# Patient Record
Sex: FEMALE | Race: WHITE | NOT HISPANIC OR LATINO | Employment: OTHER | URBAN - NONMETROPOLITAN AREA
[De-identification: names, ages, dates, MRNs, and addresses within clinical notes are randomized per-mention and may not be internally consistent; named-entity substitution may affect disease eponyms.]

---

## 2018-01-12 NOTE — MISCELLANEOUS
Message  Return to work or school:   Shasta Kussmaul is under my professional care  She was seen in my office on 1/29/16   She is able to return to work on  2/4/16      Weight Bearing Status: No Weight-Bearing  Please excuse Ms Porter from work through 2/4/16  She may return to work on [employer] duty          Signatures   Electronically signed by : Bee Marroquin MD; Jan 29 2016 11:46AM EST                       (Author)

## 2018-01-16 NOTE — MISCELLANEOUS
Assessment    1  Status post non-ST elevation myocardial infarction (NSTEMI) (412) (I25 2)   2  ASCVD (arteriosclerotic cardiovascular disease) (429 2,440 9) (I25 10)    Plan  Anxiety    · From  ALPRAZolam 0 5 MG Oral Tablet TAKE 1 TABLET 3 TIMES DAILY AS  NEEDED To ALPRAZolam 1 MG Oral Tablet TAKE 1 TABLET 3 TIMES DAILY AS NEEDED   Rx By: Gokul Sanchez; Dispense: 30 Days ; #:90 Tablet; Refill: 5; For: Anxiety; FARRAH = N; Print Rx  ASCVD (arteriosclerotic cardiovascular disease)    · Begin or continue regular aerobic exercise  Gradually work up to at least 3 sessions of  30 minutes of exercise a week ; Status:Complete;   Done: [id number]   Ordered; For:ASCVD (arteriosclerotic cardiovascular disease); Ordered By:Monserrat Blackmon;   · Brush your teeth 2 times a day and floss at least once a day ; Status:Complete;   Done:  00YQE6012   Ordered; For:ASCVD (arteriosclerotic cardiovascular disease); Ordered By:Monserrat Blackmon;   · Continue with our present treatment plan ; Status:Complete;   Done: 63CFT1613   Ordered; For:ASCVD (arteriosclerotic cardiovascular disease); Ordered By:Monserrat Blackmon;   · Eat a low fat and low cholesterol diet ; Status:Complete;   Done: 71RGB2650   Ordered; For:ASCVD (arteriosclerotic cardiovascular disease); Ordered By:Monserrat Blackmon;   · NSAIDs, avoid; Status:Complete;   Done: 39QZV5285   Ordered; For:ASCVD (arteriosclerotic cardiovascular disease); Ordered By:Monserrat Blackmon;   · We recommend routine visits to a dentist ; Status:Complete;   Done: 11RBK2840   Ordered; For:ASCVD (arteriosclerotic cardiovascular disease); Ordered By:Monserrat Blackmon;   · We recommend that you bring your body mass index down to 25 ; Status:Complete;    Done: 84LUI8953   Ordered; For:ASCVD (arteriosclerotic cardiovascular disease); Ordered By:Monserrat Blackmon;   · You need to stop smoking  Though it is not easy, more than half of all adult smokers  have quit   We encourage you to write down all the reasons you should quit smoking and  set a quit date for yourself  Ask us how we can help  You may also call  1-800-QUIT-NOW for free resources and assistance ; Status:Complete;   Done:  05VNH6012   Ordered; For:ASCVD (arteriosclerotic cardiovascular disease); Ordered By:Denita Blackmon;   · Call [phone] if: You are having chest pain with exercise ; Status:Complete;    Done: 47XCR2388   Ordered; For:ASCVD (arteriosclerotic cardiovascular disease); Ordered By:Denita Blackmon;   · Call [phone] if: You are having more frequent or more severe chest pain with  exercise ; Status:Complete;   Done: 76BSG8156   Ordered; For:ASCVD (arteriosclerotic cardiovascular disease); Ordered By:Denita Blackmon;   · Call [phone] if: You are still having difficulty breathing while lying down in 2 days ;  Status:Complete;   Done: 93MJO6121   Ordered; For:ASCVD (arteriosclerotic cardiovascular disease); Ordered By:Denita Blackmon;   · Call [phone] if: You become dizzy or lightheaded, especially when you stand up  after sitting for awhile ; Status:Complete;   Done: 53MES0567   Ordered; For:ASCVD (arteriosclerotic cardiovascular disease); Ordered By:Denita Blackmon;   · Call [phone] if: You feel your heart is beating very fast or skipping beats ;  Status:Complete;   Done: 69WXG7934   Ordered; For:ASCVD (arteriosclerotic cardiovascular disease); Ordered By:Denita Blackmon;   · Call [phone] if: You have difficulty breathing while lying down and you are  comfortable only when sitting up ; Status:Complete;   Done: 60ALL1554   Ordered; For:ASCVD (arteriosclerotic cardiovascular disease); Ordered By:Denita Blackmon;   · Call [phone] if: You start getting short of breath with your normal exercise or  physical labor ; Status:Complete;   Done: 13MOA2443   Ordered; For:ASCVD (arteriosclerotic cardiovascular disease);  Ordered By:Denita Blackmon;   · Call [phone] if: Your chest pain is happening more often ; Status:Complete;    Done: 05VSS3690   Ordered; For:ASCVD (arteriosclerotic cardiovascular disease); Ordered By:Zaki Blackmon;   · Call [phone] if: Your chest pain with exercise is not better in 3 days ;  Status:Complete;   Done: 99NXR6528   Ordered; For:ASCVD (arteriosclerotic cardiovascular disease); Ordered By:Zaki Blackmon;   · Call [phone] if: Your dizziness is getting worse ; Status:Complete;   Done:  52ZKQ1560   Ordered; For:ASCVD (arteriosclerotic cardiovascular disease); Ordered By:Zaki Blackmon;   · Call [phone] if: Your dizziness is not getting better in 2 days ; Status:Complete;    Done: 03BBU8926   Ordered; For:ASCVD (arteriosclerotic cardiovascular disease); Ordered By:Zaki Blackmon;   · Call [phone] if: Your palpitations are not getting better after treatment at home ;  Status:Complete;   Done: 93EXE3874   Ordered; For:ASCVD (arteriosclerotic cardiovascular disease); Ordered By:Zaki Blackmon;   · Call 911 if: You are too short of breath to talk, you can speak only one or two words  between breaths, or your lips or nails look blue ; Status:Complete;   Done: 93RKL8789   Ordered; For:ASCVD (arteriosclerotic cardiovascular disease); Ordered By:Zaki Blackmon;   · Call 911 if: You experience a new kind of chest pain (angina) or pressure ;  Status:Complete;   Done: 03GKK0750   Ordered; For:ASCVD (arteriosclerotic cardiovascular disease); Ordered By:Zaki Blackmon;   · Call 911 if: You have any symptoms of a stroke ; Status:Complete;   Done: 03QKS0998   Ordered; For:ASCVD (arteriosclerotic cardiovascular disease); Ordered By:Zaki Blackmon;   · Call 911 if: You have fainted or passed out ; Status:Complete;   Done: 37PGO7711   Ordered; For:ASCVD (arteriosclerotic cardiovascular disease);  Ordered By:Zaki Blackmon;   · Call 911 if: Your chest pain (angina) does not go away after 10 minutes of your usual  treatment ; Status:Complete;   Done: 45VPG4989   Ordered; For:ASCVD (arteriosclerotic cardiovascular disease); Ordered By:Victor Hugo Blackmon;   · Seek Immediate Medical Attention if: You have difficulty breathing, or you are short of  breath more often ; Status:Complete;   Done: 17KKI9828   Ordered; For:ASCVD (arteriosclerotic cardiovascular disease); Ordered By:Victor Hugo Blackmon;   · Seek Immediate Medical Attention if: Your chest pain (angina) happens at rest ;  Status:Complete;   Done: 09BAX6165   Ordered; For:ASCVD (arteriosclerotic cardiovascular disease); Ordered By:Victor Hugo Blackmon;   · Seek Immediate Medical Attention if: Your chest pain feels different to you ;  Status:Complete;   Done: 06YXW8556   Ordered; For:ASCVD (arteriosclerotic cardiovascular disease); Ordered By:Victor Hugo Blackmon; Discussion/Summary  Discussion Summary:   I long discussion with her regarding diet and exercise  I went through all of her medications explained her with medications were, there indications, and their potential side effects  Counseling Documentation With Imm: The patient was counseled regarding diagnostic results, instructions for management, risk factor reductions, prognosis, patient and family education, impressions, risks and benefits of treatment options, importance of compliance with treatment  total time of encounter was 30 minutes and 25 minutes was spent counseling  Chief Complaint  Chief Complaint Free Text Note Form: ARIELLE  PT WAS ADMITTED TO [facility] ON 1/24/16 TO 1/26/16 FOR MI  HERE TO FOLLOW UP      History of Present Illness  TCM Communication [facility]: The patient is being contacted for follow-up after hospitalization and PT CALLED TO SCHEDULE  She was hospitalized at [facility]  The date of admission: 1/24/16, date of discharge: 1/26/16  Diagnosis: CHEST PAIN  She was discharged to home  Communication performed and completed by   HPI: She was seen in the [facility] emergency room for substernal chest pain   She had positive troponins  She was diagnosed with a non-ST segment elevation myocardial infarction  She was transferred to [facility] where she underwent urgent heart catheterization  It was one vessel disease and she had a stent placed  She's currently on aspirin and Plavix  She has recently started Lopressor and 80 mg of simvastatin  She has no bleeding episodes  She denies any myalgia or muscle weakness  She is no right upper quadrant pain and no nausea  She's had no chest pain since her procedure  She is feeling well overall  She's quit smoking since she's had her heart attack  Review of Systems  Complete-Female:   Constitutional: No fever, no chills, feels well, no tiredness, no recent weight gain or weight loss  Eyes: No complaints of eye pain, no red eyes, no eyesight problems, no discharge, no dry eyes, no itching of eyes  ENT: no complaints of earache, no loss of hearing, no nose bleeds, no nasal discharge, no sore throat, no hoarseness  Cardiovascular: No complaints of slow heart rate, no fast heart rate, no chest pain, no palpitations, no leg claudication, no lower extremity edema and as noted in HPI  Respiratory: No complaints of shortness of breath, no wheezing, no cough, no SOB on exertion, no orthopnea, no PND  Gastrointestinal: No complaints of abdominal pain, no constipation, no nausea or vomiting, no diarrhea, no bloody stools  Genitourinary: No complaints of dysuria, no incontinence, no pelvic pain, no dysmenorrhea, no vaginal discharge or bleeding  Musculoskeletal: No complaints of arthralgias, no myalgias, no joint swelling or stiffness, no limb pain or swelling  Integumentary: No complaints of skin rash or lesions, no itching, no skin wounds, no breast pain or lump  Neurological: No complaints of headache, no confusion, no convulsions, no numbness, no dizziness or fainting, no tingling, no limb weakness, no difficulty walking     Psychiatric: Not suicidal, no sleep disturbance, no anxiety or depression, no change in personality, no emotional problems  Endocrine: No complaints of proptosis, no hot flashes, no muscle weakness, no deepening of the voice, no feelings of weakness  Hematologic/Lymphatic: No complaints of swollen glands, no swollen glands in the neck, does not bleed easily, does not bruise easily  Active Problems    1  Anxiety (300 00) (F41 9)   2  Cough (786 2) (R05)   3  Encounter for screening mammogram for malignant neoplasm of breast (V76 12)   (Z12 31)   4  Screening for malignant neoplasm of cervix (V76 2) (Z12 4)    Past Medical History    1  History of Anxiety disorder (300 00) (F41 9)   2  History of influenza (V12 09) (Z87 09)    Surgical History    1  History of Foot Surgery   2  History of Tubal Ligation    Family History    1  Family history of Atrial fibrillation   2  Family history of CHF (congestive heart failure)   3  Family history of Diabetes mellitus, type II   4  Family history of Hypertension, essential   5  Family history of Pleural effusion    6  Family history of Hypertension, essential    Social History    · Current Every Day Smoker (305 1)   · Current Smoker (305 1)    Current Meds   1  ALPRAZolam 0 5 MG Oral Tablet; TAKE 1 TABLET 3 TIMES DAILY AS NEEDED; Therapy: (Recorded:29Jan2016) to Recorded   2  Lipitor 80 MG Oral Tablet; TAKE 1 TABLET DAILY; Therapy: (Recorded:29Jan2016) to Recorded   3  Lopressor 50 MG Oral Tablet; take 1/2 tablet by mouth twice a day; Therapy: (Shawna Virgen) to Recorded   4  Nitroglycerin 0 4 MG SUBL; PLACE 1 TABLET UNDER THE TONGUE EVERY 5 MINUTES   UP TO 3 DOSES AS NEEDED FOR CHEST PAIN;   Therapy: (Shawna Virgen) to Recorded   5  Plavix 75 MG Oral Tablet; TAKE 1 TABLET DAILY; Therapy: (Recorded:29Jan2016) to Recorded    Allergies    1   No Known Drug Allergies    Vitals  Signs [Data Includes: Current Encounter]   Recorded: 95ILT0721 11:30AM   Heart Rate: 89  Systolic: 144  Diastolic: 74  Weight: 180 lb   BMI Calculated: 29 05  BSA Calculated: 1 91  O2 Saturation: 98    Physical Exam    Constitutional   General appearance: No acute distress, well appearing and well nourished  Pulmonary   Respiratory effort: No increased work of breathing or signs of respiratory distress  Auscultation of lungs: Clear to auscultation  Cardiovascular   Auscultation of heart: Normal rate and rhythm, normal S1 and S2, without murmurs  Examination of extremities for edema and/or varicosities: Normal     Abdomen   Abdomen: Non-tender, no masses  Liver and spleen: No hepatomegaly or splenomegaly           Signatures   Electronically signed by : Ronda Phoenix, MD; Jan 29 2016  2:47PM EST                       (Author)

## 2018-01-26 DIAGNOSIS — F41.9 ANXIETY: Primary | ICD-10-CM

## 2018-01-26 RX ORDER — ALPRAZOLAM 1 MG/1
TABLET ORAL
Qty: 90 TABLET | Refills: 5 | Status: SHIPPED | OUTPATIENT
Start: 2018-01-26 | End: 2018-04-06 | Stop reason: SDUPTHER

## 2018-02-23 DIAGNOSIS — F41.9 ANXIETY: Primary | ICD-10-CM

## 2018-02-23 RX ORDER — ALPRAZOLAM 1 MG/1
1 TABLET ORAL 3 TIMES DAILY PRN
Qty: 90 TABLET | Refills: 5 | Status: SHIPPED | OUTPATIENT
Start: 2018-02-23 | End: 2018-04-06 | Stop reason: SDUPTHER

## 2018-02-23 RX ORDER — ALPRAZOLAM 1 MG/1
1 TABLET ORAL 3 TIMES DAILY PRN
COMMUNITY
End: 2018-02-23 | Stop reason: SDUPTHER

## 2018-03-01 ENCOUNTER — DOCUMENTATION (OUTPATIENT)
Dept: FAMILY MEDICINE CLINIC | Facility: CLINIC | Age: 53
End: 2018-03-01

## 2018-04-04 RX ORDER — NITROGLYCERIN 0.4 MG/1
1 TABLET SUBLINGUAL
COMMUNITY
End: 2018-10-05

## 2018-04-04 RX ORDER — ESCITALOPRAM OXALATE 10 MG/1
1 TABLET ORAL DAILY
COMMUNITY
Start: 2016-02-25 | End: 2018-04-27

## 2018-04-04 RX ORDER — CLOPIDOGREL BISULFATE 75 MG/1
1 TABLET ORAL DAILY
COMMUNITY
End: 2018-07-13

## 2018-04-04 RX ORDER — ATORVASTATIN CALCIUM 80 MG/1
1 TABLET, FILM COATED ORAL DAILY
COMMUNITY
End: 2018-04-27 | Stop reason: SDUPTHER

## 2018-04-04 RX ORDER — METOPROLOL TARTRATE 50 MG/1
0.5 TABLET, FILM COATED ORAL 2 TIMES DAILY
COMMUNITY
End: 2018-04-27 | Stop reason: SDUPTHER

## 2018-04-06 ENCOUNTER — OFFICE VISIT (OUTPATIENT)
Dept: FAMILY MEDICINE CLINIC | Facility: CLINIC | Age: 53
End: 2018-04-06
Payer: COMMERCIAL

## 2018-04-06 VITALS
RESPIRATION RATE: 16 BRPM | OXYGEN SATURATION: 98 % | DIASTOLIC BLOOD PRESSURE: 82 MMHG | SYSTOLIC BLOOD PRESSURE: 126 MMHG | HEART RATE: 90 BPM

## 2018-04-06 DIAGNOSIS — F41.9 ANXIETY: ICD-10-CM

## 2018-04-06 PROCEDURE — 99213 OFFICE O/P EST LOW 20 MIN: CPT | Performed by: FAMILY MEDICINE

## 2018-04-06 RX ORDER — ALPRAZOLAM 1 MG/1
1 TABLET ORAL 3 TIMES DAILY PRN
Qty: 90 TABLET | Refills: 5 | Status: SHIPPED | OUTPATIENT
Start: 2018-04-06 | End: 2018-11-02 | Stop reason: SDUPTHER

## 2018-04-06 RX ORDER — ASPIRIN 81 MG/1
81 TABLET ORAL DAILY
COMMUNITY
End: 2018-07-13

## 2018-04-06 RX ORDER — ACETAMINOPHEN 325 MG/1
650 TABLET ORAL EVERY 6 HOURS PRN
COMMUNITY
End: 2019-07-03 | Stop reason: HOSPADM

## 2018-04-06 NOTE — PROGRESS NOTES
Assessment/Plan:    No problem-specific Assessment & Plan notes found for this encounter  Diagnoses and all orders for this visit:    Anxiety  -     ALPRAZolam (XANAX) 1 mg tablet; Take 1 tablet (1 mg total) by mouth 3 (three) times a day as needed for anxiety    Other orders  -     clopidogrel (PLAVIX) 75 mg tablet; Take 1 tablet by mouth daily  -     nitroglycerin (NITROSTAT) 0 4 mg SL tablet; Place 1 tablet under the tongue every 5 (five) minutes as needed  -     metoprolol tartrate (LOPRESSOR) 50 mg tablet; Take 0 5 tablets by mouth 2 (two) times a day  -     atorvastatin (LIPITOR) 80 mg tablet; Take 1 tablet by mouth daily  -     escitalopram (LEXAPRO) 10 mg tablet; Take 1 tablet by mouth daily  -     Hm Colonoscopy  -     aspirin (ECOTRIN LOW STRENGTH) 81 mg EC tablet; Take 81 mg by mouth daily  -     acetaminophen (TYLENOL) 325 mg tablet; Take 650 mg by mouth every 6 (six) hours as needed for mild pain          Subjective:      Patient ID: Gita Aschoff is a 50 y o  female  She hurt her right 5th toe about 5 days ago  She has significant pain and swelling  She bruised extensively  She has pain in various parts of her foot that radiate to different areas  She had normal x-rays  She is wearing a fracture boot and using crutches  The following portions of the patient's history were reviewed and updated as appropriate:   She  has a past medical history of Anxiety  She There are no active problems to display for this patient  She  has a past surgical history that includes Toe Surgery and Tubal ligation  Her family history includes Atrial fibrillation in her mother; Diabetes type II in her mother; Heart failure in her mother; Hypertension in her father and mother; Lung disease in her mother  She  reports that she has been smoking  She has never used smokeless tobacco  Her alcohol and drug histories are not on file    Current Outpatient Prescriptions   Medication Sig Dispense Refill  acetaminophen (TYLENOL) 325 mg tablet Take 650 mg by mouth every 6 (six) hours as needed for mild pain      ALPRAZolam (XANAX) 1 mg tablet Take 1 tablet (1 mg total) by mouth 3 (three) times a day as needed for anxiety 90 tablet 5    aspirin (ECOTRIN LOW STRENGTH) 81 mg EC tablet Take 81 mg by mouth daily      nitroglycerin (NITROSTAT) 0 4 mg SL tablet Place 1 tablet under the tongue every 5 (five) minutes as needed      atorvastatin (LIPITOR) 80 mg tablet Take 1 tablet by mouth daily      clopidogrel (PLAVIX) 75 mg tablet Take 1 tablet by mouth daily      escitalopram (LEXAPRO) 10 mg tablet Take 1 tablet by mouth daily      metoprolol tartrate (LOPRESSOR) 50 mg tablet Take 0 5 tablets by mouth 2 (two) times a day       No current facility-administered medications for this visit  No current outpatient prescriptions on file prior to visit  No current facility-administered medications on file prior to visit  She has No Known Allergies       Review of Systems   All other systems reviewed and are negative  Objective:      /82 (BP Location: Right arm, Patient Position: Sitting, Cuff Size: Large)   Pulse 90   Resp 16   SpO2 98%          Physical Exam   Constitutional: She is oriented to person, place, and time  She appears well-developed and well-nourished  Neck: Normal range of motion  Neck supple  Cardiovascular: Normal rate, regular rhythm, normal heart sounds and intact distal pulses  Pulmonary/Chest: Effort normal and breath sounds normal    Abdominal: Soft  Bowel sounds are normal    Musculoskeletal: Normal range of motion  Neurological: She is alert and oriented to person, place, and time  She has normal reflexes  Skin: Skin is warm and dry  Psychiatric: She has a normal mood and affect  Her behavior is normal  Judgment and thought content normal    Nursing note and vitals reviewed

## 2018-04-27 ENCOUNTER — OFFICE VISIT (OUTPATIENT)
Dept: FAMILY MEDICINE CLINIC | Facility: CLINIC | Age: 53
End: 2018-04-27
Payer: COMMERCIAL

## 2018-04-27 VITALS
HEART RATE: 86 BPM | RESPIRATION RATE: 16 BRPM | DIASTOLIC BLOOD PRESSURE: 74 MMHG | SYSTOLIC BLOOD PRESSURE: 124 MMHG | OXYGEN SATURATION: 97 %

## 2018-04-27 DIAGNOSIS — G89.29 CHRONIC PAIN OF RIGHT ANKLE: ICD-10-CM

## 2018-04-27 DIAGNOSIS — I25.10 ASCVD (ARTERIOSCLEROTIC CARDIOVASCULAR DISEASE): ICD-10-CM

## 2018-04-27 DIAGNOSIS — E78.5 HYPERLIPIDEMIA, UNSPECIFIED HYPERLIPIDEMIA TYPE: Primary | ICD-10-CM

## 2018-04-27 DIAGNOSIS — M25.571 CHRONIC PAIN OF RIGHT ANKLE: ICD-10-CM

## 2018-04-27 DIAGNOSIS — I25.10 CORONARY ARTERY DISEASE INVOLVING NATIVE CORONARY ARTERY OF NATIVE HEART WITHOUT ANGINA PECTORIS: ICD-10-CM

## 2018-04-27 PROCEDURE — 99214 OFFICE O/P EST MOD 30 MIN: CPT | Performed by: FAMILY MEDICINE

## 2018-04-27 RX ORDER — ATORVASTATIN CALCIUM 80 MG/1
80 TABLET, FILM COATED ORAL DAILY
Qty: 30 TABLET | Refills: 5 | Status: SHIPPED | OUTPATIENT
Start: 2018-04-27 | End: 2018-10-05 | Stop reason: SDUPTHER

## 2018-04-27 RX ORDER — METOPROLOL TARTRATE 50 MG/1
25 TABLET, FILM COATED ORAL 2 TIMES DAILY
Qty: 30 TABLET | Refills: 5 | Status: SHIPPED | OUTPATIENT
Start: 2018-04-27 | End: 2018-10-05 | Stop reason: SDUPTHER

## 2018-04-27 NOTE — PROGRESS NOTES
Assessment/Plan:    No problem-specific Assessment & Plan notes found for this encounter  Diagnoses and all orders for this visit:    Hyperlipidemia, unspecified hyperlipidemia type  -     atorvastatin (LIPITOR) 80 mg tablet; Take 1 tablet (80 mg total) by mouth daily    Coronary artery disease involving native coronary artery of native heart without angina pectoris  -     metoprolol tartrate (LOPRESSOR) 50 mg tablet; Take 0 5 tablets (25 mg total) by mouth 2 (two) times a day          Subjective:      Patient ID: Juliet Wallis is a 50 y o  female  She has on-going right foot issues  She has pain and swelling  She is in a fracture boot and crutches  She is now having pain in the top of the left foot  She has no constitutional sx's  She injured her foot and ankle at the end of March  She presented to the emergency room and had normal x-rays  She has pain, swelling, decreased range of motion, and ambulatory dysfunction since  She has practicing [facility]  She has single vessel CAD and is s/p stent  She stopped Plavix, metoprolol, and Lipitor  She is not having chest pain or SOB  She had more fatigue than anything else prior to her stent  She has no palpitations, syncope/near syncope, diaphoresis, or SOB  She would like to be established with [address] [place] [address] cardiology  The following portions of the patient's history were reviewed and updated as appropriate:   She  has a past medical history of Anxiety  She   Patient Active Problem List    Diagnosis Date Noted    ASCVD (arteriosclerotic cardiovascular disease) 01/29/2016    Status post non-ST elevation myocardial infarction (NSTEMI) 01/29/2016    S/p bare metal coronary artery stent 01/26/2016    HLD (hyperlipidemia) 01/24/2016    NSTEMI (non-ST elevated myocardial infarction) ([facility] [address] ) 01/24/2016    Anxiety 02/12/2013     She  has a past surgical history that includes Toe Surgery and Tubal ligation    Her family history includes Atrial fibrillation in her mother; Diabetes type II in her mother; Heart failure in her mother; Hypertension in her father and mother; Lung disease in her mother  She  reports that she has been smoking  She has never used smokeless tobacco  She reports that she does not drink alcohol or use drugs  Current Outpatient Prescriptions   Medication Sig Dispense Refill    ALPRAZolam (XANAX) 1 mg tablet Take 1 tablet (1 mg total) by mouth 3 (three) times a day as needed for anxiety 90 tablet 5    aspirin (ECOTRIN LOW STRENGTH) 81 mg EC tablet Take 81 mg by mouth daily      acetaminophen (TYLENOL) 325 mg tablet Take 650 mg by mouth every 6 (six) hours as needed for mild pain      atorvastatin (LIPITOR) 80 mg tablet Take 1 tablet (80 mg total) by mouth daily 30 tablet 5    clopidogrel (PLAVIX) 75 mg tablet Take 1 tablet by mouth daily      metoprolol tartrate (LOPRESSOR) 50 mg tablet Take 0 5 tablets (25 mg total) by mouth 2 (two) times a day 30 tablet 5    nitroglycerin (NITROSTAT) 0 4 mg SL tablet Place 1 tablet under the tongue every 5 (five) minutes as needed       No current facility-administered medications for this visit        Current Outpatient Prescriptions on File Prior to Visit   Medication Sig    ALPRAZolam (XANAX) 1 mg tablet Take 1 tablet (1 mg total) by mouth 3 (three) times a day as needed for anxiety    aspirin (ECOTRIN LOW STRENGTH) 81 mg EC tablet Take 81 mg by mouth daily    acetaminophen (TYLENOL) 325 mg tablet Take 650 mg by mouth every 6 (six) hours as needed for mild pain    clopidogrel (PLAVIX) 75 mg tablet Take 1 tablet by mouth daily    nitroglycerin (NITROSTAT) 0 4 mg SL tablet Place 1 tablet under the tongue every 5 (five) minutes as needed    [DISCONTINUED] atorvastatin (LIPITOR) 80 mg tablet Take 1 tablet by mouth daily    [DISCONTINUED] escitalopram (LEXAPRO) 10 mg tablet Take 1 tablet by mouth daily    [DISCONTINUED] metoprolol tartrate (LOPRESSOR) 50 mg tablet Take 0 5 tablets by mouth 2 (two) times a day     No current facility-administered medications on file prior to visit  She has No Known Allergies       Review of Systems   Musculoskeletal: Positive for arthralgias  All other systems reviewed and are negative  Objective:      /74 (BP Location: Right arm, Patient Position: Sitting, Cuff Size: Standard)   Pulse 86   Resp 16   SpO2 97%          Physical Exam   Constitutional: She is oriented to person, place, and time  She appears well-developed and well-nourished  Neck: Normal range of motion  Neck supple  Cardiovascular: Normal rate, regular rhythm, normal heart sounds and intact distal pulses  Pulmonary/Chest: Effort normal and breath sounds normal    Abdominal: Soft  Bowel sounds are normal    Musculoskeletal: Normal range of motion  Neurological: She is alert and oriented to person, place, and time  She has normal reflexes  Skin: Skin is warm and dry  Psychiatric: She has a normal mood and affect  Her behavior is normal  Judgment and thought content normal    Nursing note and vitals reviewed

## 2018-04-27 NOTE — PATIENT INSTRUCTIONS
Please restart Lipitor (atorvastatin) and Lopressor (metoprolol)  These medications are meant to keep you from having another heart attack and to keep the "partial blockages" from getting worse

## 2018-05-14 ENCOUNTER — TELEPHONE (OUTPATIENT)
Dept: FAMILY MEDICINE CLINIC | Facility: CLINIC | Age: 53
End: 2018-05-14

## 2018-05-15 DIAGNOSIS — M25.571 RIGHT ANKLE PAIN, UNSPECIFIED CHRONICITY: Primary | ICD-10-CM

## 2018-05-15 NOTE — TELEPHONE ENCOUNTER
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

## 2018-05-23 ENCOUNTER — TELEPHONE (OUTPATIENT)
Dept: FAMILY MEDICINE CLINIC | Facility: CLINIC | Age: 53
End: 2018-05-23

## 2018-05-23 NOTE — TELEPHONE ENCOUNTER
Phone call from patient stating she will need letter for [employer] stating that she needs to be off work for the month of June pending her cardiac and orthopedic testing and evaluations   FAX to [phone]

## 2018-05-30 ENCOUNTER — TELEPHONE (OUTPATIENT)
Dept: FAMILY MEDICINE CLINIC | Facility: CLINIC | Age: 53
End: 2018-05-30

## 2018-05-30 NOTE — TELEPHONE ENCOUNTER
Second request from patient   Melissa Hernandes   Requesting note to keep her off work through the end of June due to specialist appointments faxed to [facility]  Will you agree to note

## 2018-06-22 RX ORDER — ISOSORBIDE MONONITRATE 30 MG/1
30 TABLET, EXTENDED RELEASE ORAL DAILY
Refills: 0 | COMMUNITY
Start: 2018-06-18 | End: 2018-07-13

## 2018-06-22 RX ORDER — ASPIRIN 81 MG/1
TABLET ORAL
COMMUNITY
End: 2020-08-13 | Stop reason: SDUPTHER

## 2018-06-22 RX ORDER — AMOXICILLIN 500 MG/1
CAPSULE ORAL
COMMUNITY
End: 2018-07-13

## 2018-06-22 RX ORDER — NITROGLYCERIN 0.4 MG/1
TABLET SUBLINGUAL
COMMUNITY
End: 2018-10-05

## 2018-06-25 ENCOUNTER — OFFICE VISIT (OUTPATIENT)
Dept: FAMILY MEDICINE CLINIC | Facility: CLINIC | Age: 53
End: 2018-06-25
Payer: COMMERCIAL

## 2018-06-25 VITALS
WEIGHT: 199.6 LBS | RESPIRATION RATE: 15 BRPM | OXYGEN SATURATION: 97 % | BODY MASS INDEX: 32.22 KG/M2 | SYSTOLIC BLOOD PRESSURE: 116 MMHG | DIASTOLIC BLOOD PRESSURE: 68 MMHG | HEART RATE: 79 BPM

## 2018-06-25 DIAGNOSIS — I25.10 ASCVD (ARTERIOSCLEROTIC CARDIOVASCULAR DISEASE): ICD-10-CM

## 2018-06-25 DIAGNOSIS — M79.672 LEFT FOOT PAIN: ICD-10-CM

## 2018-06-25 DIAGNOSIS — E78.5 HYPERLIPIDEMIA, UNSPECIFIED HYPERLIPIDEMIA TYPE: ICD-10-CM

## 2018-06-25 DIAGNOSIS — K90.49 FATTY FOOD INTOLERANCE: Primary | ICD-10-CM

## 2018-06-25 PROCEDURE — 99214 OFFICE O/P EST MOD 30 MIN: CPT | Performed by: FAMILY MEDICINE

## 2018-06-25 NOTE — PROGRESS NOTES
Assessment/Plan:    No problem-specific Assessment & Plan notes found for this encounter  Diagnoses and all orders for this visit:    Fatty food intolerance  -     US right upper quadrant; Future  -     CBC and differential; Future  -     Comprehensive metabolic panel; Future  -     TSH, 3rd generation with T4 reflex; Future    ASCVD (arteriosclerotic cardiovascular disease)  -     CBC and differential; Future  -     Comprehensive metabolic panel; Future  -     TSH, 3rd generation with T4 reflex; Future    Left foot pain  -     XR foot 3+ vw left; Future    Hyperlipidemia, unspecified hyperlipidemia type  -     CBC and differential; Future  -     Comprehensive metabolic panel; Future  -     TSH, 3rd generation with T4 reflex; Future    Other orders  -     isosorbide mononitrate (IMDUR) 30 mg 24 hr tablet; Take 30 mg by mouth daily  -     amoxicillin (AMOXIL) 500 mg capsule; amoxicillin 500 mg capsule  -     Influenza Virus Vacc Split PF (AFLURIA PRESERVATIVE FREE) 0 5 ML YUMIKO; inject 0 5 milliliter intramuscularly          Subjective:      Patient ID: Redd Hampton is a 50 y o  female  She is scheduled for cardiac cath this Friday  She had an abnormal EKG followed by a normal TTE and nuclear stress test   Her cardiologist scheduled her  She has known heart disease and is on ASA, metoprolol, and Lipitor  She is seeing Dr Tripp Hilton ([address]) regarding her right ankle  She is wearing a boot intermittently when she has pain  She is having left foot and ankle pain  She is favoring that foot due to her right sided pain  She has pain for several weeks  She is taking Tylenol for pain  She was told by ortho to hold off on PT until cleared by cardiology  She is having burning substernal CP that is made better with with abd maneuvers  She has some GERD sx's  She has some fatty food intolerance            The following portions of the patient's history were reviewed and updated as appropriate:   She has a past medical history of Anxiety  She   Patient Active Problem List    Diagnosis Date Noted    Fatty food intolerance 06/25/2018    Left foot pain 06/25/2018    ASCVD (arteriosclerotic cardiovascular disease) 01/29/2016    Status post non-ST elevation myocardial infarction (NSTEMI) 01/29/2016    S/p bare metal coronary artery stent 01/26/2016    HLD (hyperlipidemia) 01/24/2016    NSTEMI (non-ST elevated myocardial infarction) [facility] [address] ) 01/24/2016    Anxiety 02/12/2013     She  has a past surgical history that includes Toe Surgery and Tubal ligation  Her family history includes Atrial fibrillation in her mother; Diabetes type II in her mother; Heart failure in her mother; Hypertension in her father and mother; Lung disease in her mother  She  reports that she has been smoking  She has never used smokeless tobacco  She reports that she does not drink alcohol or use drugs    Current Outpatient Prescriptions   Medication Sig Dispense Refill    acetaminophen (TYLENOL) 325 mg tablet Take 650 mg by mouth every 6 (six) hours as needed for mild pain      ALPRAZolam (XANAX) 1 mg tablet Take 1 tablet (1 mg total) by mouth 3 (three) times a day as needed for anxiety 90 tablet 5    aspirin (ASPIRIN ADULT LOW DOSE) 81 mg EC tablet Adult Low Dose Aspirin      atorvastatin (LIPITOR) 80 mg tablet Take 1 tablet (80 mg total) by mouth daily 30 tablet 5    diclofenac sodium (VOLTAREN) 1 % Apply 2 g topically 4 (four) times a day 5 Tube 5    isosorbide mononitrate (IMDUR) 30 mg 24 hr tablet Take 30 mg by mouth daily  0    metoprolol tartrate (LOPRESSOR) 50 mg tablet Take 0 5 tablets (25 mg total) by mouth 2 (two) times a day 30 tablet 5    nitroglycerin (NITROSTAT) 0 4 mg SL tablet Place 1 tablet under the tongue every 5 (five) minutes as needed      amoxicillin (AMOXIL) 500 mg capsule amoxicillin 500 mg capsule      aspirin (ECOTRIN LOW STRENGTH) 81 mg EC tablet Take 81 mg by mouth daily      clopidogrel (PLAVIX) 75 mg tablet Take 1 tablet by mouth daily      Influenza Virus Vacc Split PF (AFLURIA PRESERVATIVE FREE) 0 5 ML YUMIKO inject 0 5 milliliter intramuscularly      nitroglycerin (NITROSTAT) 0 4 mg SL tablet PRN       No current facility-administered medications for this visit  Current Outpatient Prescriptions on File Prior to Visit   Medication Sig    acetaminophen (TYLENOL) 325 mg tablet Take 650 mg by mouth every 6 (six) hours as needed for mild pain    ALPRAZolam (XANAX) 1 mg tablet Take 1 tablet (1 mg total) by mouth 3 (three) times a day as needed for anxiety    aspirin (ASPIRIN ADULT LOW DOSE) 81 mg EC tablet Adult Low Dose Aspirin    atorvastatin (LIPITOR) 80 mg tablet Take 1 tablet (80 mg total) by mouth daily    diclofenac sodium (VOLTAREN) 1 % Apply 2 g topically 4 (four) times a day    metoprolol tartrate (LOPRESSOR) 50 mg tablet Take 0 5 tablets (25 mg total) by mouth 2 (two) times a day    nitroglycerin (NITROSTAT) 0 4 mg SL tablet Place 1 tablet under the tongue every 5 (five) minutes as needed    aspirin (ECOTRIN LOW STRENGTH) 81 mg EC tablet Take 81 mg by mouth daily    clopidogrel (PLAVIX) 75 mg tablet Take 1 tablet by mouth daily    nitroglycerin (NITROSTAT) 0 4 mg SL tablet PRN     No current facility-administered medications on file prior to visit  She has No Known Allergies       Review of Systems      Objective:      /68 (BP Location: Right arm, Patient Position: Sitting, Cuff Size: Standard)   Pulse 79   Resp 15   Wt 90 5 kg (199 lb 9 6 oz) Comment: with boot on  SpO2 97%   BMI 32 22 kg/m²          Physical Exam

## 2018-06-27 LAB
ALBUMIN SERPL-MCNC: 4.1 G/DL (ref 3.6–5.1)
ALBUMIN/GLOB SERPL: 1.9 (CALC) (ref 1–2.5)
ALP SERPL-CCNC: 128 U/L (ref 33–130)
ALT SERPL-CCNC: 30 U/L (ref 6–29)
AST SERPL-CCNC: 21 U/L (ref 10–35)
BASOPHILS # BLD AUTO: 84 CELLS/UL (ref 0–200)
BASOPHILS NFR BLD AUTO: 1 %
BILIRUB SERPL-MCNC: 0.4 MG/DL (ref 0.2–1.2)
BUN SERPL-MCNC: 10 MG/DL (ref 7–25)
BUN/CREAT SERPL: ABNORMAL (CALC) (ref 6–22)
CALCIUM SERPL-MCNC: 9 MG/DL (ref 8.6–10.4)
CHLORIDE SERPL-SCNC: 104 MMOL/L (ref 98–110)
CO2 SERPL-SCNC: 24 MMOL/L (ref 20–31)
CREAT SERPL-MCNC: 0.92 MG/DL (ref 0.5–1.05)
EOSINOPHIL # BLD AUTO: 176 CELLS/UL (ref 15–500)
EOSINOPHIL NFR BLD AUTO: 2.1 %
ERYTHROCYTE [DISTWIDTH] IN BLOOD BY AUTOMATED COUNT: 12.7 % (ref 11–15)
GLOBULIN SER CALC-MCNC: 2.2 G/DL (CALC) (ref 1.9–3.7)
GLUCOSE SERPL-MCNC: 113 MG/DL (ref 65–99)
HCT VFR BLD AUTO: 44.5 % (ref 35–45)
HGB BLD-MCNC: 15.1 G/DL (ref 11.7–15.5)
LYMPHOCYTES # BLD AUTO: 1898 CELLS/UL (ref 850–3900)
LYMPHOCYTES NFR BLD AUTO: 22.6 %
MCH RBC QN AUTO: 30.6 PG (ref 27–33)
MCHC RBC AUTO-ENTMCNC: 33.9 G/DL (ref 32–36)
MCV RBC AUTO: 90.1 FL (ref 80–100)
MONOCYTES # BLD AUTO: 470 CELLS/UL (ref 200–950)
MONOCYTES NFR BLD AUTO: 5.6 %
NEUTROPHILS # BLD AUTO: 5771 CELLS/UL (ref 1500–7800)
NEUTROPHILS NFR BLD AUTO: 68.7 %
PLATELET # BLD AUTO: 229 THOUSAND/UL (ref 140–400)
PMV BLD REES-ECKER: 9.7 FL (ref 7.5–12.5)
POTASSIUM SERPL-SCNC: 4.4 MMOL/L (ref 3.5–5.3)
PROT SERPL-MCNC: 6.3 G/DL (ref 6.1–8.1)
RBC # BLD AUTO: 4.94 MILLION/UL (ref 3.8–5.1)
SL AMB EGFR AFRICAN AMERICAN: 84 ML/MIN/1.73M2
SL AMB EGFR NON AFRICAN AMERICAN: 73 ML/MIN/1.73M2
SODIUM SERPL-SCNC: 139 MMOL/L (ref 135–146)
TSH SERPL-ACNC: 2.97 MIU/L
WBC # BLD AUTO: 8.4 THOUSAND/UL (ref 3.8–10.8)

## 2018-07-13 ENCOUNTER — OFFICE VISIT (OUTPATIENT)
Dept: FAMILY MEDICINE CLINIC | Facility: CLINIC | Age: 53
End: 2018-07-13

## 2018-07-13 VITALS — OXYGEN SATURATION: 97 % | HEART RATE: 86 BPM | SYSTOLIC BLOOD PRESSURE: 126 MMHG | DIASTOLIC BLOOD PRESSURE: 72 MMHG

## 2018-07-13 DIAGNOSIS — I25.10 ASCVD (ARTERIOSCLEROTIC CARDIOVASCULAR DISEASE): ICD-10-CM

## 2018-07-13 DIAGNOSIS — E78.5 HYPERLIPIDEMIA, UNSPECIFIED HYPERLIPIDEMIA TYPE: ICD-10-CM

## 2018-07-13 DIAGNOSIS — Z95.5 S/P BARE METAL CORONARY ARTERY STENT: ICD-10-CM

## 2018-07-13 DIAGNOSIS — I21.4 NSTEMI (NON-ST ELEVATED MYOCARDIAL INFARCTION) (HCC): ICD-10-CM

## 2018-07-13 DIAGNOSIS — M79.672 LEFT FOOT PAIN: ICD-10-CM

## 2018-07-13 DIAGNOSIS — R00.2 PALPITATIONS: ICD-10-CM

## 2018-07-13 DIAGNOSIS — I10 ESSENTIAL HYPERTENSION: Primary | ICD-10-CM

## 2018-07-13 PROCEDURE — 99214 OFFICE O/P EST MOD 30 MIN: CPT | Performed by: FAMILY MEDICINE

## 2018-07-13 RX ORDER — AMLODIPINE BESYLATE 5 MG/1
5 TABLET ORAL DAILY
Refills: 0
Start: 2018-07-13 | End: 2018-10-05

## 2018-07-13 NOTE — PROGRESS NOTES
Assessment/Plan:    No problem-specific Assessment & Plan notes found for this encounter  Diagnoses and all orders for this visit:    Essential hypertension  -     amLODIPine (NORVASC) 5 mg tablet; Take 1 tablet (5 mg total) by mouth daily    ASCVD (arteriosclerotic cardiovascular disease)    [facility]    Hyperlipidemia, unspecified hyperlipidemia type    S/p bare metal coronary artery stent    Left foot pain  -     MRI foot/forefoot toes left wo contrast; Future          Subjective:      Patient ID: Remi Parent is a 50 y o  female  For she is status post  Non ST segment elevation myocardial infarction  She underwent cardiac catheterization at that time and has a bare metal stent  She had repeat cardiac catheterization on the  29th of June  She has 2 lesions  One is 50 percent and the 2nd is 60 percent  She is being medically managed with  Toprol 25 mg daily, 81 mg of aspirin, 75 mg of clopidogrel, and 80 mg of Lipitor  She states that she knows that the lesions are present, but she has no anginal symptoms  She has a RUQ U/S pending to evaluate some vague epigastric discomfort  She continues to have burning pain in the left foot  She has trouble walking  She has decreased ROM throughout  She has no trauma  She has a fairly normal x-ray  She is seeing a foot and ankle specialist       She denies anxiety  The following portions of the patient's history were reviewed and updated as appropriate:   She  has a past medical history of Anxiety    She   Patient Active Problem List    Diagnosis Date Noted    Fatty food intolerance 06/25/2018    Left foot pain 06/25/2018    ASCVD (arteriosclerotic cardiovascular disease) 01/29/2016    Status post non-ST elevation myocardial infarction (NSTEMI) 01/29/2016    S/p bare metal coronary artery stent 01/26/2016    HLD (hyperlipidemia) 01/24/2016    [facility] 01/[facility]/2016    Anxiety 02/12/2013     She  has a past surgical history that includes Toe Surgery and Tubal ligation  Her family history includes Atrial fibrillation in her mother; Diabetes type II in her mother; Heart failure in her mother; Hypertension in her father and mother; Lung disease in her mother  She  reports that she has been smoking  She has never used smokeless tobacco  She reports that she does not drink alcohol or use drugs  Current Outpatient Prescriptions   Medication Sig Dispense Refill    acetaminophen (TYLENOL) 325 mg tablet Take 650 mg by mouth every 6 (six) hours as needed for mild pain      ALPRAZolam (XANAX) 1 mg tablet Take 1 tablet (1 mg total) by mouth 3 (three) times a day as needed for anxiety 90 tablet 5    amLODIPine (NORVASC) 5 mg tablet Take 1 tablet (5 mg total) by mouth daily  0    aspirin (ASPIRIN ADULT LOW DOSE) 81 mg EC tablet Adult Low Dose Aspirin      atorvastatin (LIPITOR) 80 mg tablet Take 1 tablet (80 mg total) by mouth daily 30 tablet 5    diclofenac sodium (VOLTAREN) 1 % Apply 2 g topically 4 (four) times a day 5 Tube 5    Influenza Virus Vacc Split PF (AFLURIA PRESERVATIVE FREE) 0 5 ML YUMIKO inject 0 5 milliliter intramuscularly      metoprolol tartrate (LOPRESSOR) 50 mg tablet Take 0 5 tablets (25 mg total) by mouth 2 (two) times a day 30 tablet 5    nitroglycerin (NITROSTAT) 0 4 mg SL tablet Place 1 tablet under the tongue every 5 (five) minutes as needed      nitroglycerin (NITROSTAT) 0 4 mg SL tablet PRN       No current facility-administered medications for this visit        Current Outpatient Prescriptions on File Prior to Visit   Medication Sig    acetaminophen (TYLENOL) 325 mg tablet Take 650 mg by mouth every 6 (six) hours as needed for mild pain    ALPRAZolam (XANAX) 1 mg tablet Take 1 tablet (1 mg total) by mouth 3 (three) times a day as needed for anxiety    aspirin (ASPIRIN ADULT LOW DOSE) 81 mg EC tablet Adult Low Dose Aspirin    atorvastatin (LIPITOR) 80 mg tablet Take 1 tablet (80 mg total) by mouth daily    diclofenac sodium (VOLTAREN) 1 % Apply 2 g topically 4 (four) times a day    Influenza Virus Vacc Split PF (AFLURIA PRESERVATIVE FREE) 0 5 ML YUMIKO inject 0 5 milliliter intramuscularly    metoprolol tartrate (LOPRESSOR) 50 mg tablet Take 0 5 tablets (25 mg total) by mouth 2 (two) times a day    nitroglycerin (NITROSTAT) 0 4 mg SL tablet Place 1 tablet under the tongue every 5 (five) minutes as needed    nitroglycerin (NITROSTAT) 0 4 mg SL tablet PRN    [DISCONTINUED] amoxicillin (AMOXIL) 500 mg capsule amoxicillin 500 mg capsule    [DISCONTINUED] aspirin (ECOTRIN LOW STRENGTH) 81 mg EC tablet Take 81 mg by mouth daily    [DISCONTINUED] clopidogrel (PLAVIX) 75 mg tablet Take 1 tablet by mouth daily    [DISCONTINUED] isosorbide mononitrate (IMDUR) 30 mg 24 hr tablet Take 30 mg by mouth daily     No current facility-administered medications on file prior to visit  She has No Known Allergies       Review of Systems   All other systems reviewed and are negative  Objective:      /72 (BP Location: Left arm, Patient Position: Sitting)   Pulse 86   SpO2 97%          Physical Exam   Constitutional: She is oriented to person, place, and time  She appears well-developed and well-nourished  Neck: Normal range of motion  Neck supple  Cardiovascular: Normal rate, regular rhythm, normal heart sounds and intact distal pulses  Pulmonary/Chest: Effort normal and breath sounds normal    Abdominal: Soft  Bowel sounds are normal    Musculoskeletal: Normal range of motion  Neurological: She is alert and oriented to person, place, and time  She has normal reflexes  Skin: Skin is warm and dry  Psychiatric: She has a normal mood and affect  Her behavior is normal  Judgment and thought content normal    Nursing note and vitals reviewed

## 2018-07-30 ENCOUNTER — HOSPITAL ENCOUNTER (OUTPATIENT)
Dept: NON INVASIVE DIAGNOSTICS | Facility: HOSPITAL | Age: 53
Discharge: HOME | End: 2018-07-30
Attending: FAMILY MEDICINE
Payer: COMMERCIAL

## 2018-07-30 DIAGNOSIS — R00.2 PALPITATIONS: ICD-10-CM

## 2018-07-30 PROCEDURE — 93225 XTRNL ECG REC<48 HRS REC: CPT

## 2018-07-30 PROCEDURE — 93226 XTRNL ECG REC<48 HR SCAN A/R: CPT

## 2018-08-03 PROCEDURE — 93227 XTRNL ECG REC<48 HR R&I: CPT | Performed by: INTERNAL MEDICINE

## 2018-08-09 RX ORDER — ISOSORBIDE MONONITRATE 30 MG/1
TABLET, EXTENDED RELEASE ORAL
COMMUNITY
End: 2019-07-03 | Stop reason: HOSPADM

## 2018-08-10 ENCOUNTER — OFFICE VISIT (OUTPATIENT)
Dept: FAMILY MEDICINE CLINIC | Facility: CLINIC | Age: 53
End: 2018-08-10

## 2018-08-10 VITALS
RESPIRATION RATE: 15 BRPM | OXYGEN SATURATION: 98 % | WEIGHT: 198.6 LBS | DIASTOLIC BLOOD PRESSURE: 80 MMHG | SYSTOLIC BLOOD PRESSURE: 128 MMHG | HEIGHT: 65 IN | HEART RATE: 96 BPM | BODY MASS INDEX: 33.09 KG/M2

## 2018-08-10 DIAGNOSIS — M79.672 LEFT FOOT PAIN: Primary | ICD-10-CM

## 2018-08-10 PROCEDURE — 99212 OFFICE O/P EST SF 10 MIN: CPT | Performed by: FAMILY MEDICINE

## 2018-08-10 NOTE — PROGRESS NOTES
Assessment/Plan:    No problem-specific Assessment & Plan notes found for this encounter  Diagnoses and all orders for this visit:    Left foot pain        Cont current  Subjective:      Patient ID: Tere Kelsey is a 48 y o  female  She continues to have pain and decreased ROM of both lower extremities  She has pain and swelling in both feet  She was not able to start PT  She is doing home techniques with stretching  She is having a hard time walking  She is fairly sedentary  She has b/l foot cramping  She had a relatively normal Holter monitor (PAC's)  She has some sinus tachycardia  She is on 25 mg of Lopressor twice daily  The following portions of the patient's history were reviewed and updated as appropriate:   She  has a past medical history of Anxiety  She   Patient Active Problem List    Diagnosis Date Noted    Palpitations 07/13/2018    Fatty food intolerance 06/25/2018    Left foot pain 06/25/2018    ASCVD (arteriosclerotic cardiovascular disease) 01/29/2016    Status post non-ST elevation myocardial infarction (NSTEMI) 01/29/2016    S/p bare metal coronary artery stent 01/26/2016    HLD (hyperlipidemia) 01/24/2016    NSTEMI (non-ST elevated myocardial infarction) ([facility] [address] ) 01/24/2016    Anxiety 02/12/2013     She  has a past surgical history that includes Toe Surgery and Tubal ligation  Her family history includes Atrial fibrillation in her mother; Diabetes type II in her mother; Heart failure in her mother; Hypertension in her father and mother; Lung disease in her mother  She  reports that she has been smoking  She has never used smokeless tobacco  She reports that she does not drink alcohol or use drugs    Current Outpatient Prescriptions   Medication Sig Dispense Refill    acetaminophen (TYLENOL) 325 mg tablet Take 650 mg by mouth every 6 (six) hours as needed for mild pain      ALPRAZolam (XANAX) 1 mg tablet Take 1 tablet (1 mg total) by mouth 3 (three) times a day as needed for anxiety 90 tablet 5    amLODIPine (NORVASC) 5 mg tablet Take 1 tablet (5 mg total) by mouth daily  0    aspirin (ASPIRIN ADULT LOW DOSE) 81 mg EC tablet Adult Low Dose Aspirin      atorvastatin (LIPITOR) 80 mg tablet Take 1 tablet (80 mg total) by mouth daily 30 tablet 5    diclofenac sodium (VOLTAREN) 1 % Apply 2 g topically 4 (four) times a day 5 Tube 5    metoprolol tartrate (LOPRESSOR) 50 mg tablet Take 0 5 tablets (25 mg total) by mouth 2 (two) times a day 30 tablet 5    Influenza Virus Vacc Split PF (AFLURIA PRESERVATIVE FREE) 0 5 ML YUMIKO inject 0 5 milliliter intramuscularly      isosorbide mononitrate (IMDUR) 30 mg 24 hr tablet take 1 tablet by mouth once daily      nitroglycerin (NITROSTAT) 0 4 mg SL tablet Place 1 tablet under the tongue every 5 (five) minutes as needed      nitroglycerin (NITROSTAT) 0 4 mg SL tablet PRN       No current facility-administered medications for this visit        Current Outpatient Prescriptions on File Prior to Visit   Medication Sig    acetaminophen (TYLENOL) 325 mg tablet Take 650 mg by mouth every 6 (six) hours as needed for mild pain    ALPRAZolam (XANAX) 1 mg tablet Take 1 tablet (1 mg total) by mouth 3 (three) times a day as needed for anxiety    amLODIPine (NORVASC) 5 mg tablet Take 1 tablet (5 mg total) by mouth daily    aspirin (ASPIRIN ADULT LOW DOSE) 81 mg EC tablet Adult Low Dose Aspirin    atorvastatin (LIPITOR) 80 mg tablet Take 1 tablet (80 mg total) by mouth daily    diclofenac sodium (VOLTAREN) 1 % Apply 2 g topically 4 (four) times a day    metoprolol tartrate (LOPRESSOR) 50 mg tablet Take 0 5 tablets (25 mg total) by mouth 2 (two) times a day    Influenza Virus Vacc Split PF (AFLURIA PRESERVATIVE FREE) 0 5 ML YUMIKO inject 0 5 milliliter intramuscularly    isosorbide mononitrate (IMDUR) 30 mg 24 hr tablet take 1 tablet by mouth once daily    nitroglycerin (NITROSTAT) 0 4 mg SL tablet Place 1 tablet under the tongue every 5 (five) minutes as needed    nitroglycerin (NITROSTAT) 0 4 mg SL tablet PRN     No current facility-administered medications on file prior to visit  She has No Known Allergies       Review of Systems   Cardiovascular: Positive for palpitations  All other systems reviewed and are negative  Objective:      /80 (BP Location: Right arm, Patient Position: Sitting, Cuff Size: Standard)   Pulse 96   Resp 15   Ht 5' 5" (1 651 m)   Wt 90 1 kg (198 lb 9 6 oz)   SpO2 98%   BMI 33 05 kg/m²          Physical Exam   Constitutional: She is oriented to person, place, and time  She appears well-developed and well-nourished  Neck: Normal range of motion  Neck supple  Cardiovascular: Normal rate, regular rhythm, normal heart sounds and intact distal pulses  Pulmonary/Chest: Effort normal and breath sounds normal    Abdominal: Soft  Bowel sounds are normal    Musculoskeletal: Normal range of motion  Neurological: She is alert and oriented to person, place, and time  She has normal reflexes  Skin: Skin is warm and dry  Psychiatric: She has a normal mood and affect  Her behavior is normal  Judgment and thought content normal    Nursing note and vitals reviewed

## 2018-09-07 ENCOUNTER — TELEPHONE (OUTPATIENT)
Dept: FAMILY MEDICINE CLINIC | Facility: CLINIC | Age: 53
End: 2018-09-07

## 2018-09-07 ENCOUNTER — DOCUMENTATION (OUTPATIENT)
Dept: FAMILY MEDICINE CLINIC | Facility: CLINIC | Age: 53
End: 2018-09-07

## 2018-10-05 ENCOUNTER — DOCUMENTATION (OUTPATIENT)
Dept: FAMILY MEDICINE CLINIC | Facility: CLINIC | Age: 53
End: 2018-10-05

## 2018-10-05 ENCOUNTER — OFFICE VISIT (OUTPATIENT)
Dept: FAMILY MEDICINE CLINIC | Facility: CLINIC | Age: 53
End: 2018-10-05
Payer: COMMERCIAL

## 2018-10-05 VITALS
BODY MASS INDEX: 34.39 KG/M2 | DIASTOLIC BLOOD PRESSURE: 62 MMHG | OXYGEN SATURATION: 97 % | RESPIRATION RATE: 16 BRPM | HEART RATE: 75 BPM | WEIGHT: 206.4 LBS | HEIGHT: 65 IN | SYSTOLIC BLOOD PRESSURE: 124 MMHG

## 2018-10-05 DIAGNOSIS — J32.9 SINUSITIS, UNSPECIFIED CHRONICITY, UNSPECIFIED LOCATION: ICD-10-CM

## 2018-10-05 DIAGNOSIS — R00.2 PALPITATIONS: ICD-10-CM

## 2018-10-05 DIAGNOSIS — E78.5 HYPERLIPIDEMIA, UNSPECIFIED HYPERLIPIDEMIA TYPE: ICD-10-CM

## 2018-10-05 DIAGNOSIS — I25.10 CORONARY ARTERY DISEASE INVOLVING NATIVE CORONARY ARTERY OF NATIVE HEART WITHOUT ANGINA PECTORIS: ICD-10-CM

## 2018-10-05 DIAGNOSIS — Z95.5 S/P BARE METAL CORONARY ARTERY STENT: ICD-10-CM

## 2018-10-05 DIAGNOSIS — K90.49 FATTY FOOD INTOLERANCE: Primary | ICD-10-CM

## 2018-10-05 DIAGNOSIS — M79.672 LEFT FOOT PAIN: ICD-10-CM

## 2018-10-05 PROCEDURE — 99214 OFFICE O/P EST MOD 30 MIN: CPT | Performed by: FAMILY MEDICINE

## 2018-10-05 RX ORDER — METOPROLOL TARTRATE 50 MG/1
50 TABLET, FILM COATED ORAL 2 TIMES DAILY
Qty: 60 TABLET | Refills: 5 | Status: SHIPPED | OUTPATIENT
Start: 2018-10-05 | End: 2018-11-12 | Stop reason: SDUPTHER

## 2018-10-05 RX ORDER — AMOXICILLIN 500 MG/1
500 CAPSULE ORAL EVERY 8 HOURS SCHEDULED
Qty: 21 CAPSULE | Refills: 0 | Status: SHIPPED | OUTPATIENT
Start: 2018-10-05 | End: 2018-10-12

## 2018-10-05 RX ORDER — RANITIDINE 150 MG/1
150 CAPSULE ORAL 2 TIMES DAILY
Qty: 60 CAPSULE | Refills: 5 | Status: SHIPPED | OUTPATIENT
Start: 2018-10-05 | End: 2018-11-02 | Stop reason: SDUPTHER

## 2018-10-05 RX ORDER — ATORVASTATIN CALCIUM 80 MG/1
80 TABLET, FILM COATED ORAL DAILY
Qty: 30 TABLET | Refills: 5 | Status: SHIPPED | OUTPATIENT
Start: 2018-10-05 | End: 2018-11-02 | Stop reason: SDUPTHER

## 2018-10-05 NOTE — PATIENT INSTRUCTIONS
Ranitidine (By injection)   Ranitidine ([id number])  Treats stomach ulcers and conditions that cause your stomach to make too much acid (such as Zollinger-Arroyo syndrome)  This medicine is given to people who cannot take Zantac® by mouth  Brand Name(s): Zantac   There may be other brand names for this medicine  When This Medicine Should Not Be Used: You should not use this medicine if you have had an allergic reaction to ranitidine  How to Use This Medicine:   Injectable  · Your doctor will prescribe your exact dose and tell you how often it should be given  This medicine is given as a shot into a vein or into one of your muscles  · A nurse or other health provider will give you this medicine  Drugs and Foods to Avoid:   Ask your doctor or pharmacist before using any other medicine, including over-the-counter medicines, vitamins, and herbal products  · Make sure your doctor knows if you are also using triazolam (Halcion®)  Warnings While Using This Medicine:   · Make sure your doctor knows if you are pregnant or breast feeding  Tell your doctor if you have kidney disease, liver disease, heart rhythm problems, or a history of acute porphyria  · Tell any doctor or dentist who treats you that you are using this medicine  This medicine may affect certain medical test results  Possible Side Effects While Using This Medicine:   Call your doctor right away if you notice any of these side effects:  · Allergic reaction: Itching or hives, swelling in your face or hands, swelling or tingling in your mouth or throat, chest tightness, trouble breathing  · Blistering, peeling, red skin rash  · Dark-colored urine or pale stools  · Fast, slow, or uneven heartbeat  · Nausea, vomiting, loss of appetite, pain in your upper stomach  · Pain, itching, or burning in the skin where the needle is placed  · Unusual bruising, bleeding, or weakness  · Yellowing of your skin or the whites of your eyes    If you notice these less serious side effects, talk with your doctor:   · Constipation or diarrhea  · Headache, may be severe  · Upset stomach  If you notice other side effects that you think are caused by this medicine, tell your doctor  Call your doctor for medical advice about side effects  You may report side effects to FDA at 0-385-FDA-2693  © 2017 [employer]  Information is for End User's use only and may not be sold, redistributed or otherwise used for commercial purposes  The above information is an  only  It is not intended as medical advice for individual conditions or treatments  Talk to your doctor, nurse or pharmacist before following any medical regimen to see if it is safe and effective for you  Follow a low fat diet until we can get your ultrasound

## 2018-10-05 NOTE — PROGRESS NOTES
Assessment/Plan:    No problem-specific Assessment & Plan notes found for this encounter  Diagnoses and all orders for this visit:    Fatty food intolerance  -     US right upper quadrant; Future    Hyperlipidemia, unspecified hyperlipidemia type  -     atorvastatin (LIPITOR) 80 mg tablet; Take 1 tablet (80 mg total) by mouth daily    Coronary artery disease involving native coronary artery of native heart without angina pectoris  -     metoprolol tartrate (LOPRESSOR) 50 mg tablet; Take 1 tablet (50 mg total) by mouth 2 (two) times a day    Sinusitis, unspecified chronicity, unspecified location  -     amoxicillin (AMOXIL) 500 mg capsule; Take 1 capsule (500 mg total) by mouth every 8 (eight) hours for 7 days    Palpitations    S/p bare metal coronary artery stent          Subjective:      Patient ID: Rian Chaidez is a 48 y o  female  She continues to have palpitations  She had a relatively normal cardiac cath in July of 2018  She had a normal Holter monitor this summer as well  She has fatty food intolerance, heart burn, and bloating  She has not gone for RUQ U/S  The following portions of the patient's history were reviewed and updated as appropriate:   She  has a past medical history of Anxiety  She   Patient Active Problem List    Diagnosis Date Noted    Palpitations 07/13/2018    Fatty food intolerance 06/25/2018    Left foot pain 06/25/2018    ASCVD (arteriosclerotic cardiovascular disease) 01/29/2016    Status post non-ST elevation myocardial infarction (NSTEMI) 01/29/2016    S/p bare metal coronary artery stent 01/26/2016    HLD (hyperlipidemia) 01/24/2016    NSTEMI (non-ST elevated myocardial infarction) ([facility] [address] ) 01/24/2016    Anxiety 02/12/2013     She  has a past surgical history that includes Toe Surgery and Tubal ligation    Her family history includes Atrial fibrillation in her mother; Diabetes type II in her mother; Heart failure in her mother; Hypertension in her father and mother; Lung disease in her mother  She  reports that she has been smoking  She has never used smokeless tobacco  She reports that she does not drink alcohol or use drugs  Current Outpatient Prescriptions   Medication Sig Dispense Refill    acetaminophen (TYLENOL) 325 mg tablet Take 650 mg by mouth every 6 (six) hours as needed for mild pain      ALPRAZolam (XANAX) 1 mg tablet Take 1 tablet (1 mg total) by mouth 3 (three) times a day as needed for anxiety 90 tablet 5    aspirin (ASPIRIN ADULT LOW DOSE) 81 mg EC tablet Adult Low Dose Aspirin      atorvastatin (LIPITOR) 80 mg tablet Take 1 tablet (80 mg total) by mouth daily 30 tablet 5    diclofenac sodium (VOLTAREN) 1 % Apply 2 g topically 4 (four) times a day 5 Tube 5    isosorbide mononitrate (IMDUR) 30 mg 24 hr tablet take 1 tablet by mouth once daily      metoprolol tartrate (LOPRESSOR) 50 mg tablet Take 1 tablet (50 mg total) by mouth 2 (two) times a day 60 tablet 5    amoxicillin (AMOXIL) 500 mg capsule Take 1 capsule (500 mg total) by mouth every 8 (eight) hours for 7 days 21 capsule 0    Influenza Virus Vacc Split PF (AFLURIA PRESERVATIVE FREE) 0 5 ML YUMIKO inject 0 5 milliliter intramuscularly       No current facility-administered medications for this visit        Current Outpatient Prescriptions on File Prior to Visit   Medication Sig    acetaminophen (TYLENOL) 325 mg tablet Take 650 mg by mouth every 6 (six) hours as needed for mild pain    ALPRAZolam (XANAX) 1 mg tablet Take 1 tablet (1 mg total) by mouth 3 (three) times a day as needed for anxiety    aspirin (ASPIRIN ADULT LOW DOSE) 81 mg EC tablet Adult Low Dose Aspirin    diclofenac sodium (VOLTAREN) 1 % Apply 2 g topically 4 (four) times a day    isosorbide mononitrate (IMDUR) 30 mg 24 hr tablet take 1 tablet by mouth once daily    [DISCONTINUED] atorvastatin (LIPITOR) 80 mg tablet Take 1 tablet (80 mg total) by mouth daily    [DISCONTINUED] metoprolol tartrate (LOPRESSOR) 50 mg tablet Take 0 5 tablets (25 mg total) by mouth 2 (two) times a day (Patient taking differently: Take 50 mg by mouth 2 (two) times a day  )    Influenza Virus Vacc Split PF (AFLURIA PRESERVATIVE FREE) 0 5 ML YUMIKO inject 0 5 milliliter intramuscularly    [DISCONTINUED] amLODIPine (NORVASC) 5 mg tablet Take 1 tablet (5 mg total) by mouth daily (Patient not taking: Reported on 10/5/2018 )    [DISCONTINUED] nitroglycerin (NITROSTAT) 0 4 mg SL tablet Place 1 tablet under the tongue every 5 (five) minutes as needed    [DISCONTINUED] nitroglycerin (NITROSTAT) 0 4 mg SL tablet PRN     No current facility-administered medications on file prior to visit  She has No Known Allergies       Review of Systems   All other systems reviewed and are negative  Objective:      /62 (BP Location: Right arm, Patient Position: Sitting, Cuff Size: Standard)   Pulse 75   Resp 16   Ht 5' 5" (1 651 m)   Wt 93 6 kg (206 lb 6 4 oz)   SpO2 97%   BMI 34 35 kg/m²          Physical Exam   Constitutional: She is oriented to person, place, and time  She appears well-developed and well-nourished  Neck: Normal range of motion  Neck supple  Cardiovascular: Normal rate, regular rhythm, normal heart sounds and intact distal pulses  Pulmonary/Chest: Effort normal and breath sounds normal    Abdominal: Soft  Bowel sounds are normal    Musculoskeletal: Normal range of motion  Neurological: She is alert and oriented to person, place, and time  She has normal reflexes  Skin: Skin is warm and dry  Psychiatric: She has a normal mood and affect  Her behavior is normal  Judgment and thought content normal    Nursing note and vitals reviewed

## 2018-11-02 DIAGNOSIS — F41.9 ANXIETY: ICD-10-CM

## 2018-11-02 DIAGNOSIS — K90.49 FATTY FOOD INTOLERANCE: ICD-10-CM

## 2018-11-02 DIAGNOSIS — E78.5 HYPERLIPIDEMIA, UNSPECIFIED HYPERLIPIDEMIA TYPE: ICD-10-CM

## 2018-11-02 RX ORDER — RANITIDINE 150 MG/1
150 CAPSULE ORAL 2 TIMES DAILY
Qty: 60 CAPSULE | Refills: 0 | Status: SHIPPED | OUTPATIENT
Start: 2018-11-02 | End: 2018-11-12 | Stop reason: SDUPTHER

## 2018-11-02 RX ORDER — ALPRAZOLAM 1 MG/1
1 TABLET ORAL 3 TIMES DAILY PRN
Qty: 90 TABLET | Refills: 0 | Status: SHIPPED | OUTPATIENT
Start: 2018-11-02 | End: 2018-11-12 | Stop reason: SDUPTHER

## 2018-11-02 RX ORDER — ATORVASTATIN CALCIUM 80 MG/1
80 TABLET, FILM COATED ORAL DAILY
Qty: 30 TABLET | Refills: 0 | Status: SHIPPED | OUTPATIENT
Start: 2018-11-02 | End: 2019-05-28 | Stop reason: SDUPTHER

## 2018-11-07 DIAGNOSIS — K90.49 FATTY FOOD INTOLERANCE: ICD-10-CM

## 2018-11-09 RX ORDER — AMLODIPINE BESYLATE 5 MG/1
TABLET ORAL
Refills: 1 | COMMUNITY
Start: 2018-11-02 | End: 2018-11-12

## 2018-11-12 ENCOUNTER — OFFICE VISIT (OUTPATIENT)
Dept: FAMILY MEDICINE CLINIC | Facility: CLINIC | Age: 53
End: 2018-11-12
Payer: COMMERCIAL

## 2018-11-12 VITALS
SYSTOLIC BLOOD PRESSURE: 114 MMHG | WEIGHT: 206.6 LBS | HEART RATE: 77 BPM | RESPIRATION RATE: 16 BRPM | DIASTOLIC BLOOD PRESSURE: 80 MMHG | HEIGHT: 65 IN | BODY MASS INDEX: 34.42 KG/M2 | OXYGEN SATURATION: 97 %

## 2018-11-12 DIAGNOSIS — R68.81 EARLY SATIETY: Primary | ICD-10-CM

## 2018-11-12 DIAGNOSIS — K90.49 FATTY FOOD INTOLERANCE: ICD-10-CM

## 2018-11-12 DIAGNOSIS — I21.4 NSTEMI (NON-ST ELEVATED MYOCARDIAL INFARCTION) (HCC): ICD-10-CM

## 2018-11-12 DIAGNOSIS — G47.33 OBSTRUCTIVE SLEEP APNEA SYNDROME: ICD-10-CM

## 2018-11-12 DIAGNOSIS — F41.9 ANXIETY: ICD-10-CM

## 2018-11-12 DIAGNOSIS — I25.10 ASCVD (ARTERIOSCLEROTIC CARDIOVASCULAR DISEASE): ICD-10-CM

## 2018-11-12 DIAGNOSIS — I25.10 CORONARY ARTERY DISEASE INVOLVING NATIVE CORONARY ARTERY OF NATIVE HEART WITHOUT ANGINA PECTORIS: ICD-10-CM

## 2018-11-12 PROCEDURE — 99214 OFFICE O/P EST MOD 30 MIN: CPT | Performed by: FAMILY MEDICINE

## 2018-11-12 PROCEDURE — 3008F BODY MASS INDEX DOCD: CPT | Performed by: FAMILY MEDICINE

## 2018-11-12 RX ORDER — ALPRAZOLAM 1 MG/1
1 TABLET ORAL 3 TIMES DAILY PRN
Qty: 90 TABLET | Refills: 5 | Status: SHIPPED | OUTPATIENT
Start: 2018-11-12 | End: 2019-05-28 | Stop reason: SDUPTHER

## 2018-11-12 RX ORDER — METOPROLOL TARTRATE 50 MG/1
50 TABLET, FILM COATED ORAL 2 TIMES DAILY
Qty: 60 TABLET | Refills: 5 | Status: SHIPPED | OUTPATIENT
Start: 2018-11-12 | End: 2019-11-05 | Stop reason: SDUPTHER

## 2018-11-12 RX ORDER — RANITIDINE 150 MG/1
150 CAPSULE ORAL 2 TIMES DAILY
Qty: 60 CAPSULE | Refills: 5 | Status: SHIPPED | OUTPATIENT
Start: 2018-11-12 | End: 2019-06-05 | Stop reason: SDUPTHER

## 2018-11-12 NOTE — PROGRESS NOTES
Assessment/Plan:    No problem-specific Assessment & Plan notes found for this encounter  Diagnoses and all orders for this visit:    Early satiety  -     CT abdomen pelvis w contrast; Future    Anxiety  -     ALPRAZolam (XANAX) 1 mg tablet; Take 1 tablet (1 mg total) by mouth 3 (three) times a day as needed for anxiety    Coronary artery disease involving native coronary artery of native heart without angina pectoris  -     metoprolol tartrate (LOPRESSOR) 50 mg tablet; Take 1 tablet (50 mg total) by mouth 2 (two) times a day    Fatty food intolerance  -     ranitidine (ZANTAC) 150 MG capsule; Take 1 capsule (150 mg total) by mouth 2 (two) times a day  -     NM hepatobiliary w rx; Future    Other orders  -     Discontinue: amLODIPine (NORVASC) 5 mg tablet;           Subjective:      Patient ID: Jeana Potter is a 48 y o  female  She continues to have fatty food intolerance  She has nausea and GERD  She has early satiety  Her weight is stable  She has a number of complaints regarding her feet  She has pain and decreased mobility  She has known CAD and needs cardiovascular follow-up  She had nuclear stress test, echo, and cardiac cath this summer  The following portions of the patient's history were reviewed and updated as appropriate:   She  has a past medical history of Anxiety  She   Patient Active Problem List    Diagnosis Date Noted    Palpitations 07/13/2018    Fatty food intolerance 06/25/2018    Left foot pain 06/25/2018    ASCVD (arteriosclerotic cardiovascular disease) 01/29/2016    Status post non-ST elevation myocardial infarction (NSTEMI) 01/29/2016    S/p bare metal coronary artery stent 01/26/2016    HLD (hyperlipidemia) 01/24/2016    NSTEMI (non-ST elevated myocardial infarction) ([facility] [address] ) 01/24/2016    Anxiety 02/12/2013     She  has a past surgical history that includes Toe Surgery and Tubal ligation    Her family history includes Atrial fibrillation in her mother; Diabetes type II in her mother; Heart failure in her mother; Hypertension in her father and mother; Lung disease in her mother  She  reports that she has been smoking  She has never used smokeless tobacco  She reports that she does not drink alcohol or use drugs  Current Outpatient Prescriptions   Medication Sig Dispense Refill    acetaminophen (TYLENOL) 325 mg tablet Take 650 mg by mouth every 6 (six) hours as needed for mild pain      ALPRAZolam (XANAX) 1 mg tablet Take 1 tablet (1 mg total) by mouth 3 (three) times a day as needed for anxiety 90 tablet 5    aspirin (ASPIRIN ADULT LOW DOSE) 81 mg EC tablet Adult Low Dose Aspirin      atorvastatin (LIPITOR) 80 mg tablet Take 1 tablet (80 mg total) by mouth daily 30 tablet 0    isosorbide mononitrate (IMDUR) 30 mg 24 hr tablet take 1 tablet by mouth once daily      metoprolol tartrate (LOPRESSOR) 50 mg tablet Take 1 tablet (50 mg total) by mouth 2 (two) times a day 60 tablet 5    ranitidine (ZANTAC) 150 MG capsule Take 1 capsule (150 mg total) by mouth 2 (two) times a day 60 capsule 5    Influenza Virus Vacc Split PF (AFLURIA PRESERVATIVE FREE) 0 5 ML YUMIKO inject 0 5 milliliter intramuscularly       No current facility-administered medications for this visit        Current Outpatient Prescriptions on File Prior to Visit   Medication Sig    acetaminophen (TYLENOL) 325 mg tablet Take 650 mg by mouth every 6 (six) hours as needed for mild pain    aspirin (ASPIRIN ADULT LOW DOSE) 81 mg EC tablet Adult Low Dose Aspirin    atorvastatin (LIPITOR) 80 mg tablet Take 1 tablet (80 mg total) by mouth daily    isosorbide mononitrate (IMDUR) 30 mg 24 hr tablet take 1 tablet by mouth once daily    [DISCONTINUED] ALPRAZolam (XANAX) 1 mg tablet Take 1 tablet (1 mg total) by mouth 3 (three) times a day as needed for anxiety    [DISCONTINUED] metoprolol tartrate (LOPRESSOR) 50 mg tablet Take 1 tablet (50 mg total) by mouth 2 (two) times a day    [DISCONTINUED] ranitidine (ZANTAC) 150 MG capsule Take 1 capsule (150 mg total) by mouth 2 (two) times a day    Influenza Virus Vacc Split PF (AFLURIA PRESERVATIVE FREE) 0 5 ML YUMIKO inject 0 5 milliliter intramuscularly    [DISCONTINUED] diclofenac sodium (VOLTAREN) 1 % Apply 2 g topically 4 (four) times a day (Patient not taking: Reported on 11/12/2018 )    [DISCONTINUED] diclofenac sodium (VOLTAREN) 1 % Apply 2 g topically 4 (four) times a day (Patient not taking: Reported on 11/12/2018 )     No current facility-administered medications on file prior to visit  She has No Known Allergies       Review of Systems   All other systems reviewed and are negative  Objective:      /80 (BP Location: Left arm, Patient Position: Sitting, Cuff Size: Large)   Pulse 77   Resp 16   Ht 5' 5" (1 651 m)   Wt 93 7 kg (206 lb 9 6 oz)   SpO2 97%   BMI 34 38 kg/m²          Physical Exam   Constitutional: She is oriented to person, place, and time  She appears well-developed and well-nourished  Neck: Normal range of motion  Neck supple  Cardiovascular: Normal rate, regular rhythm, normal heart sounds and intact distal pulses  Pulmonary/Chest: Effort normal and breath sounds normal    Abdominal: Soft  Bowel sounds are normal    Musculoskeletal: Normal range of motion  Neurological: She is alert and oriented to person, place, and time  She has normal reflexes  Skin: Skin is warm and dry  Psychiatric: She has a normal mood and affect  Her behavior is normal  Judgment and thought content normal    Nursing note and vitals reviewed

## 2018-12-13 ENCOUNTER — TELEPHONE (OUTPATIENT)
Dept: FAMILY MEDICINE CLINIC | Facility: CLINIC | Age: 53
End: 2018-12-13

## 2019-01-09 ENCOUNTER — HOSPITAL ENCOUNTER (OUTPATIENT)
Dept: CT IMAGING | Facility: HOSPITAL | Age: 54
Discharge: HOME | End: 2019-01-09
Attending: FAMILY MEDICINE
Payer: COMMERCIAL

## 2019-01-09 DIAGNOSIS — R68.81 EARLY SATIETY: ICD-10-CM

## 2019-01-09 PROCEDURE — 74177 CT ABD & PELVIS W/CONTRAST: CPT

## 2019-01-09 RX ADMIN — IOHEXOL 100 ML: 350 INJECTION, SOLUTION INTRAVENOUS at 07:47

## 2019-01-15 ENCOUNTER — OFFICE VISIT (OUTPATIENT)
Dept: FAMILY MEDICINE CLINIC | Facility: CLINIC | Age: 54
End: 2019-01-15
Payer: COMMERCIAL

## 2019-01-15 VITALS
WEIGHT: 210.4 LBS | OXYGEN SATURATION: 97 % | HEIGHT: 65 IN | RESPIRATION RATE: 14 BRPM | DIASTOLIC BLOOD PRESSURE: 72 MMHG | HEART RATE: 81 BPM | SYSTOLIC BLOOD PRESSURE: 120 MMHG | BODY MASS INDEX: 35.06 KG/M2

## 2019-01-15 DIAGNOSIS — I21.4 NSTEMI (NON-ST ELEVATED MYOCARDIAL INFARCTION) (HCC): ICD-10-CM

## 2019-01-15 DIAGNOSIS — K90.49 FATTY FOOD INTOLERANCE: ICD-10-CM

## 2019-01-15 DIAGNOSIS — I25.10 ASCVD (ARTERIOSCLEROTIC CARDIOVASCULAR DISEASE): ICD-10-CM

## 2019-01-15 DIAGNOSIS — Z95.5 S/P BARE METAL CORONARY ARTERY STENT: ICD-10-CM

## 2019-01-15 DIAGNOSIS — I25.2 STATUS POST NON-ST ELEVATION MYOCARDIAL INFARCTION (NSTEMI): ICD-10-CM

## 2019-01-15 DIAGNOSIS — R93.5 ABNORMAL CT OF THE ABDOMEN: Primary | ICD-10-CM

## 2019-01-15 PROCEDURE — 99214 OFFICE O/P EST MOD 30 MIN: CPT | Performed by: FAMILY MEDICINE

## 2019-01-15 PROCEDURE — 3008F BODY MASS INDEX DOCD: CPT | Performed by: FAMILY MEDICINE

## 2019-01-15 NOTE — PROGRESS NOTES
Assessment/Plan:    Fatty food intolerance  Await HIDA  Obstructive sleep apnea syndrome  Await sleep study  ASCVD (arteriosclerotic cardiovascular disease)  Check nuclear stress test       Diagnoses and all orders for this visit:    Abnormal CT of the abdomen  -     MRI abdomen w wo contrast; Future    Fatty food intolerance    Status post non-ST elevation myocardial infarction (NSTEMI)  -     NM myocardial perfusion spect (rx stress and/or rest); Future    S/p bare metal coronary artery stent  -     NM myocardial perfusion spect (rx stress and/or rest); Future    NSTEMI (non-ST elevated myocardial infarction) ([address] )  -     NM myocardial perfusion spect (rx stress and/or rest); Future    ASCVD (arteriosclerotic cardiovascular disease)  -     NM myocardial perfusion spect (rx stress and/or rest); Future          Subjective:      Patient ID: Fidel Mcmullen is a 48 y o  female  She has a number of concerns today:    1 )  She has epigastric and substernal pain  She has a number of potential etiologies based on her pain  She has fatty food intolerance with a normal right upper quadrant ultrasound  She he is going for a HIDA scan  She had a relatively normal CT scan of the abdomen  See below for details  She has known coronary artery disease  Her substernal pain is quite atypical as it happens at rest and is burning in nature  She states that this does not feel like her usual reflux symptoms, however  2 )  She had a recent CT abd  There "is a irregular stellate appearing 9 mm focus of enhancement in the posterior right hepatic lobe on image 24 of series 2  This appears to be branching and therefore may represent a vascular lesion "      3)  She falls asleep at in operating times  When she does fall asleep, she sleeps deeply and misses texts and phone calls  She does not remember falling asleep and is out for hours at a time  4 )  She continues to have pain in both ankles  They lock in place  The following portions of the patient's history were reviewed and updated as appropriate:   She  has a past medical history of Anxiety  She   Patient Active Problem List    Diagnosis Date Noted    Abnormal CT of the abdomen 01/15/2019    Obstructive sleep apnea syndrome 11/12/2018    Palpitations 07/13/2018    Fatty food intolerance 06/25/2018    Left foot pain 06/25/2018    ASCVD (arteriosclerotic cardiovascular disease) 01/29/2016    Status post non-ST elevation myocardial infarction (NSTEMI) 01/29/2016    S/p bare metal coronary artery stent 01/26/2016    HLD (hyperlipidemia) 01/24/2016    NSTEMI (non-ST elevated myocardial infarction) ([facility] [address] ) 01/24/2016    Anxiety 02/12/2013     She  has a past surgical history that includes Toe Surgery and Tubal ligation  Her family history includes Atrial fibrillation in her mother; Diabetes type II in her mother; Heart failure in her mother; Hypertension in her father and mother; Lung disease in her mother  She  reports that she has been smoking  She has never used smokeless tobacco  She reports that she does not drink alcohol or use drugs    Current Outpatient Prescriptions   Medication Sig Dispense Refill    acetaminophen (TYLENOL) 325 mg tablet Take 650 mg by mouth every 6 (six) hours as needed for mild pain      ALPRAZolam (XANAX) 1 mg tablet Take 1 tablet (1 mg total) by mouth 3 (three) times a day as needed for anxiety 90 tablet 5    aspirin (ASPIRIN ADULT LOW DOSE) 81 mg EC tablet Adult Low Dose Aspirin      atorvastatin (LIPITOR) 80 mg tablet Take 1 tablet (80 mg total) by mouth daily 30 tablet 0    isosorbide mononitrate (IMDUR) 30 mg 24 hr tablet take 1 tablet by mouth once daily      metoprolol tartrate (LOPRESSOR) 50 mg tablet Take 1 tablet (50 mg total) by mouth 2 (two) times a day 60 tablet 5    ranitidine (ZANTAC) 150 MG capsule Take 1 capsule (150 mg total) by mouth 2 (two) times a day 60 capsule 5    Influenza Virus Vacc Split PF (AFLURIA PRESERVATIVE FREE) 0 5 ML YUMIKO inject 0 5 milliliter intramuscularly       No current facility-administered medications for this visit  Current Outpatient Prescriptions on File Prior to Visit   Medication Sig    acetaminophen (TYLENOL) 325 mg tablet Take 650 mg by mouth every 6 (six) hours as needed for mild pain    ALPRAZolam (XANAX) 1 mg tablet Take 1 tablet (1 mg total) by mouth 3 (three) times a day as needed for anxiety    aspirin (ASPIRIN ADULT LOW DOSE) 81 mg EC tablet Adult Low Dose Aspirin    atorvastatin (LIPITOR) 80 mg tablet Take 1 tablet (80 mg total) by mouth daily    isosorbide mononitrate (IMDUR) 30 mg 24 hr tablet take 1 tablet by mouth once daily    metoprolol tartrate (LOPRESSOR) 50 mg tablet Take 1 tablet (50 mg total) by mouth 2 (two) times a day    ranitidine (ZANTAC) 150 MG capsule Take 1 capsule (150 mg total) by mouth 2 (two) times a day    Influenza Virus Vacc Split PF (AFLURIA PRESERVATIVE FREE) 0 5 ML YUMIKO inject 0 5 milliliter intramuscularly     No current facility-administered medications on file prior to visit  She has No Known Allergies       Review of Systems   All other systems reviewed and are negative  Objective:      /72 (BP Location: Left arm, Patient Position: Sitting, Cuff Size: Large)   Pulse 81   Resp 14   Ht 5' 5" (1 651 m)   Wt 95 4 kg (210 lb 6 4 oz)   SpO2 97%   BMI 35 01 kg/m²          Physical Exam   Constitutional: She is oriented to person, place, and time  She appears well-developed and well-nourished  Neck: Normal range of motion  Neck supple  Cardiovascular: Normal rate, regular rhythm, normal heart sounds and intact distal pulses  Pulmonary/Chest: Effort normal and breath sounds normal    Abdominal: Soft  Bowel sounds are normal    Musculoskeletal: Normal range of motion  Neurological: She is alert and oriented to person, place, and time  She has normal reflexes  Skin: Skin is warm and dry     Psychiatric: She has a normal mood and affect  Her behavior is normal  Judgment and thought content normal    Nursing note and vitals reviewed

## 2019-02-13 ENCOUNTER — HOSPITAL ENCOUNTER (OUTPATIENT)
Dept: NUCLEAR MEDICINE | Facility: HOSPITAL | Age: 54
Discharge: HOME | End: 2019-02-13
Attending: FAMILY MEDICINE
Payer: COMMERCIAL

## 2019-02-13 DIAGNOSIS — I21.4 NSTEMI (NON-ST ELEVATED MYOCARDIAL INFARCTION) (HCC): ICD-10-CM

## 2019-02-13 DIAGNOSIS — Z95.5 S/P BARE METAL CORONARY ARTERY STENT: ICD-10-CM

## 2019-02-13 DIAGNOSIS — I25.2 STATUS POST NON-ST ELEVATION MYOCARDIAL INFARCTION (NSTEMI): ICD-10-CM

## 2019-02-13 DIAGNOSIS — I25.10 ASCVD (ARTERIOSCLEROTIC CARDIOVASCULAR DISEASE): ICD-10-CM

## 2019-02-13 PROCEDURE — 78452 HT MUSCLE IMAGE SPECT MULT: CPT

## 2019-02-13 PROCEDURE — 93016 CV STRESS TEST SUPVJ ONLY: CPT | Performed by: INTERNAL MEDICINE

## 2019-02-13 PROCEDURE — A9502 TC99M TETROFOSMIN: HCPCS

## 2019-02-13 PROCEDURE — 78452 HT MUSCLE IMAGE SPECT MULT: CPT | Performed by: INTERNAL MEDICINE

## 2019-02-13 PROCEDURE — 93018 CV STRESS TEST I&R ONLY: CPT | Performed by: INTERNAL MEDICINE

## 2019-02-13 PROCEDURE — 93017 CV STRESS TEST TRACING ONLY: CPT

## 2019-02-13 RX ADMIN — REGADENOSON 0.4 MG: 0.08 INJECTION, SOLUTION INTRAVENOUS at 09:55

## 2019-02-15 LAB
CHEST PAIN STATEMENT: NORMAL
MAX DIASTOLIC BP: 70 MMHG
MAX HEART RATE: 93 BPM
MAX PREDICTED HEART RATE: 169 BPM
MAX. SYSTOLIC BP: 114 MMHG
PROTOCOL NAME: NORMAL
REASON FOR TERMINATION: NORMAL
TARGET HR FORMULA: NORMAL
TEST INDICATION: NORMAL
TIME IN EXERCISE PHASE: NORMAL

## 2019-02-18 ENCOUNTER — HOSPITAL ENCOUNTER (OUTPATIENT)
Dept: MRI IMAGING | Facility: HOSPITAL | Age: 54
Discharge: HOME | End: 2019-02-18
Attending: FAMILY MEDICINE
Payer: COMMERCIAL

## 2019-02-18 DIAGNOSIS — R93.5 ABNORMAL CT OF THE ABDOMEN: ICD-10-CM

## 2019-02-18 PROCEDURE — 74183 MRI ABD W/O CNTR FLWD CNTR: CPT

## 2019-02-18 PROCEDURE — A9585 GADOBUTROL INJECTION: HCPCS | Performed by: FAMILY MEDICINE

## 2019-02-18 RX ADMIN — GADOBUTROL 9 ML: 604.72 INJECTION INTRAVENOUS at 09:08

## 2019-03-26 RX ORDER — NITROGLYCERIN 0.4 MG/1
TABLET SUBLINGUAL
COMMUNITY
Start: 2016-01-26 | End: 2019-07-03 | Stop reason: HOSPADM

## 2019-03-26 RX ORDER — AMLODIPINE BESYLATE 5 MG/1
TABLET ORAL
Refills: 1 | COMMUNITY
Start: 2019-01-30 | End: 2019-07-01 | Stop reason: SDUPTHER

## 2019-03-29 ENCOUNTER — OFFICE VISIT (OUTPATIENT)
Dept: FAMILY MEDICINE CLINIC | Facility: CLINIC | Age: 54
End: 2019-03-29
Payer: COMMERCIAL

## 2019-03-29 VITALS
RESPIRATION RATE: 15 BRPM | DIASTOLIC BLOOD PRESSURE: 64 MMHG | SYSTOLIC BLOOD PRESSURE: 106 MMHG | BODY MASS INDEX: 34.05 KG/M2 | HEART RATE: 74 BPM | OXYGEN SATURATION: 95 % | WEIGHT: 204.4 LBS | HEIGHT: 65 IN

## 2019-03-29 DIAGNOSIS — G47.33 OBSTRUCTIVE SLEEP APNEA SYNDROME: ICD-10-CM

## 2019-03-29 DIAGNOSIS — E78.5 HYPERLIPIDEMIA, UNSPECIFIED HYPERLIPIDEMIA TYPE: ICD-10-CM

## 2019-03-29 DIAGNOSIS — F41.9 ANXIETY: ICD-10-CM

## 2019-03-29 DIAGNOSIS — Z95.5 S/P BARE METAL CORONARY ARTERY STENT: ICD-10-CM

## 2019-03-29 DIAGNOSIS — K90.49 FATTY FOOD INTOLERANCE: Primary | ICD-10-CM

## 2019-03-29 PROCEDURE — 99214 OFFICE O/P EST MOD 30 MIN: CPT | Performed by: FAMILY MEDICINE

## 2019-03-29 PROCEDURE — 3008F BODY MASS INDEX DOCD: CPT | Performed by: FAMILY MEDICINE

## 2019-03-29 NOTE — ASSESSMENT & PLAN NOTE
BMI Counseling: Body mass index is 34 01 kg/m²  Discussed the patient's BMI with her  The BMI is above average  BMI counseling and education was provided to the patient  Nutrition recommendations include reducing portion sizes, decreasing overall calorie intake, 3-5 servings of fruits/vegetables daily, reducing fast food intake, consuming healthier snacks and decreasing soda and/or juice intake  Exercise recommendations include moderate aerobic physical activity for 150 minutes/week, vigorous aerobic physical activity for 75 minutes/week, exercising 3-5 times per week, joining a gym and strength training exercises

## 2019-03-29 NOTE — PROGRESS NOTES
Assessment/Plan:    HLD (hyperlipidemia)  Stable  Continue current  S/p bare metal coronary artery stent  Stable  Continue current  BMI 34 0-34 9,adult  BMI Counseling: Body mass index is 34 01 kg/m²  Discussed the patient's BMI with her  The BMI is above average  BMI counseling and education was provided to the patient  Nutrition recommendations include reducing portion sizes, decreasing overall calorie intake, 3-5 servings of fruits/vegetables daily, reducing fast food intake, consuming healthier snacks and decreasing soda and/or juice intake  Exercise recommendations include moderate aerobic physical activity for 150 minutes/week, vigorous aerobic physical activity for 75 minutes/week, exercising 3-5 times per week, joining a gym and strength training exercises  Diagnoses and all orders for this visit:    Fatty food intolerance  -     NM hepatobiliary w rx; Future  -     Ambulatory referral to Gastroenterology; Future    Hyperlipidemia, unspecified hyperlipidemia type    S/p bare metal coronary artery stent    Anxiety    Obstructive sleep apnea syndrome  -     Diagnostic Sleep Study; Future    Other orders  -     amLODIPine (NORVASC) 5 mg tablet  -     nitroglycerin (NITROSTAT) 0 4 mg SL tablet; nitroglycerin 0 4 mg sublingual tablet   AS NEEDED FOR CP AS DESCRIBED          Subjective:      Patient ID: Zurdo Rosario is a 48 y o  female  She has fatty-food intolerance with a normal RUQ u/s  I ordered a HIDA, but I do not think it has been performed yet  She had an abnormal CT abd (liver lesion) and a f/u MRI that showed hemangioma of the liver  She has known cardiovascular disease and status post bare metal stent  She recently saw Cardiology and a 1 year follow-up is planned  They do not believe any of her symptoms are cardiac in nature  Her left ankle continues to lock in place  She has pain and decreased ROM    She has shooting pain that runs between the 1st and second toe that radiates up the forefoot  She had remote trauma to this foot  She is falling asleep at inappropriate times  She will fall asleep and not realize that she has been unconscious for hours  She will sleep through texts and phone calls  She has excessive fatigue  The following portions of the patient's history were reviewed and updated as appropriate:   She  has a past medical history of Anxiety  She   Patient Active Problem List    Diagnosis Date Noted    BMI 34 0-34 9,adult 03/29/2019    Abnormal CT of the abdomen 01/15/2019    Obstructive sleep apnea syndrome 11/12/2018    Palpitations 07/13/2018    Fatty food intolerance 06/25/2018    Left foot pain 06/25/2018    ASCVD (arteriosclerotic cardiovascular disease) 01/29/2016    Status post non-ST elevation myocardial infarction (NSTEMI) 01/29/2016    S/p bare metal coronary artery stent 01/26/2016    HLD (hyperlipidemia) 01/24/2016    NSTEMI (non-ST elevated myocardial infarction) ([facility] [address] ) 01/24/2016    Anxiety 02/12/2013     She  has a past surgical history that includes Toe Surgery and Tubal ligation  Her family history includes Atrial fibrillation in her mother; Diabetes type II in her mother; Heart failure in her mother; Hypertension in her father and mother; Lung disease in her mother  She  reports that she has been smoking  She has never used smokeless tobacco  She reports that she does not drink alcohol or use drugs    Current Outpatient Medications   Medication Sig Dispense Refill    acetaminophen (TYLENOL) 325 mg tablet Take 650 mg by mouth every 6 (six) hours as needed for mild pain      ALPRAZolam (XANAX) 1 mg tablet Take 1 tablet (1 mg total) by mouth 3 (three) times a day as needed for anxiety 90 tablet 5    amLODIPine (NORVASC) 5 mg tablet   1    aspirin (ASPIRIN ADULT LOW DOSE) 81 mg EC tablet Adult Low Dose Aspirin      atorvastatin (LIPITOR) 80 mg tablet Take 1 tablet (80 mg total) by mouth daily 30 tablet 0    isosorbide mononitrate (IMDUR) 30 mg 24 hr tablet take 1 tablet by mouth once daily      metoprolol tartrate (LOPRESSOR) 50 mg tablet Take 1 tablet (50 mg total) by mouth 2 (two) times a day 60 tablet 5    nitroglycerin (NITROSTAT) 0 4 mg SL tablet nitroglycerin 0 4 mg sublingual tablet   AS NEEDED FOR CP AS DESCRIBED      ranitidine (ZANTAC) 150 MG capsule Take 1 capsule (150 mg total) by mouth 2 (two) times a day 60 capsule 5    Influenza Virus Vacc Split PF (AFLURIA PRESERVATIVE FREE) 0 5 ML YUMIKO inject 0 5 milliliter intramuscularly       No current facility-administered medications for this visit  Current Outpatient Medications on File Prior to Visit   Medication Sig    acetaminophen (TYLENOL) 325 mg tablet Take 650 mg by mouth every 6 (six) hours as needed for mild pain    ALPRAZolam (XANAX) 1 mg tablet Take 1 tablet (1 mg total) by mouth 3 (three) times a day as needed for anxiety    amLODIPine (NORVASC) 5 mg tablet     aspirin (ASPIRIN ADULT LOW DOSE) 81 mg EC tablet Adult Low Dose Aspirin    atorvastatin (LIPITOR) 80 mg tablet Take 1 tablet (80 mg total) by mouth daily    isosorbide mononitrate (IMDUR) 30 mg 24 hr tablet take 1 tablet by mouth once daily    metoprolol tartrate (LOPRESSOR) 50 mg tablet Take 1 tablet (50 mg total) by mouth 2 (two) times a day    nitroglycerin (NITROSTAT) 0 4 mg SL tablet nitroglycerin 0 4 mg sublingual tablet   AS NEEDED FOR CP AS DESCRIBED    ranitidine (ZANTAC) 150 MG capsule Take 1 capsule (150 mg total) by mouth 2 (two) times a day    Influenza Virus Vacc Split PF (AFLURIA PRESERVATIVE FREE) 0 5 ML YUMIKO inject 0 5 milliliter intramuscularly     No current facility-administered medications on file prior to visit  She has No Known Allergies       Review of Systems   All other systems reviewed and are negative          Objective:      /64 (BP Location: Right arm, Patient Position: Sitting, Cuff Size: Standard)   Pulse 74   Resp 15   Ht 5' 5" (1 651 m) Wt 92 7 kg (204 lb 6 4 oz)   SpO2 95%   BMI 34 01 kg/m²          Physical Exam   Constitutional: She is oriented to person, place, and time  She appears well-developed and well-nourished  Neck: Normal range of motion  Neck supple  Cardiovascular: Normal rate, regular rhythm, normal heart sounds and intact distal pulses  Pulmonary/Chest: Effort normal and breath sounds normal    Abdominal: Soft  Bowel sounds are normal    Musculoskeletal: Normal range of motion  Neurological: She is alert and oriented to person, place, and time  Skin: Skin is warm and dry  Capillary refill takes less than 2 seconds  Psychiatric: She has a normal mood and affect  Her behavior is normal  Judgment and thought content normal    Nursing note and vitals reviewed

## 2019-04-12 ENCOUNTER — TELEPHONE (OUTPATIENT)
Dept: OTOLARYNGOLOGY | Facility: CLINIC | Age: 54
End: 2019-04-12

## 2019-04-22 ENCOUNTER — HOSPITAL ENCOUNTER (OUTPATIENT)
Dept: NUCLEAR MEDICINE | Facility: HOSPITAL | Age: 54
Discharge: HOME | End: 2019-04-22
Attending: FAMILY MEDICINE
Payer: COMMERCIAL

## 2019-04-22 ENCOUNTER — TELEPHONE (OUTPATIENT)
Dept: FAMILY MEDICINE CLINIC | Facility: CLINIC | Age: 54
End: 2019-04-22

## 2019-04-22 ENCOUNTER — TELEPHONE (OUTPATIENT)
Dept: GASTROENTEROLOGY | Facility: AMBULARY SURGERY CENTER | Age: 54
End: 2019-04-22

## 2019-04-22 DIAGNOSIS — K90.49 FATTY FOOD INTOLERANCE: ICD-10-CM

## 2019-04-22 PROCEDURE — 78227 HEPATOBIL SYST IMAGE W/DRUG: CPT

## 2019-04-22 PROCEDURE — A9537 TC99M MEBROFENIN: HCPCS

## 2019-04-22 RX ADMIN — SINCALIDE 1.9 MCG: 5 INJECTION, POWDER, LYOPHILIZED, FOR SOLUTION INTRAVENOUS at 08:30

## 2019-04-26 ENCOUNTER — TRANSITIONAL CARE MANAGEMENT (OUTPATIENT)
Dept: FAMILY MEDICINE CLINIC | Facility: CLINIC | Age: 54
End: 2019-04-26

## 2019-04-30 ENCOUNTER — OFFICE VISIT (OUTPATIENT)
Dept: FAMILY MEDICINE CLINIC | Facility: CLINIC | Age: 54
End: 2019-04-30
Payer: COMMERCIAL

## 2019-04-30 VITALS
HEART RATE: 68 BPM | BODY MASS INDEX: 33.39 KG/M2 | DIASTOLIC BLOOD PRESSURE: 82 MMHG | WEIGHT: 200.4 LBS | OXYGEN SATURATION: 92 % | SYSTOLIC BLOOD PRESSURE: 124 MMHG | HEIGHT: 65 IN | TEMPERATURE: 97.7 F

## 2019-04-30 DIAGNOSIS — Z09 HOSPITAL DISCHARGE FOLLOW-UP: Primary | ICD-10-CM

## 2019-04-30 DIAGNOSIS — I25.119 CORONARY ARTERY DISEASE INVOLVING NATIVE CORONARY ARTERY OF NATIVE HEART WITH ANGINA PECTORIS (HCC): ICD-10-CM

## 2019-04-30 DIAGNOSIS — F41.9 ANXIETY: ICD-10-CM

## 2019-04-30 PROCEDURE — 99495 TRANSJ CARE MGMT MOD F2F 14D: CPT | Performed by: NURSE PRACTITIONER

## 2019-05-28 DIAGNOSIS — E78.5 HYPERLIPIDEMIA, UNSPECIFIED HYPERLIPIDEMIA TYPE: ICD-10-CM

## 2019-05-28 DIAGNOSIS — F41.9 ANXIETY: ICD-10-CM

## 2019-05-28 RX ORDER — ATORVASTATIN CALCIUM 80 MG/1
80 TABLET, FILM COATED ORAL DAILY
Qty: 30 TABLET | Refills: 5 | Status: SHIPPED | OUTPATIENT
Start: 2019-05-28 | End: 2019-11-05 | Stop reason: SDUPTHER

## 2019-05-28 RX ORDER — ALPRAZOLAM 1 MG/1
1 TABLET ORAL 3 TIMES DAILY PRN
Qty: 90 TABLET | Refills: 5 | Status: SHIPPED | OUTPATIENT
Start: 2019-05-28 | End: 2019-11-05 | Stop reason: SDUPTHER

## 2019-05-31 ENCOUNTER — TELEPHONE (OUTPATIENT)
Dept: SLEEP CENTER | Facility: CLINIC | Age: 54
End: 2019-05-31

## 2019-05-31 ENCOUNTER — HOSPITAL ENCOUNTER (OUTPATIENT)
Dept: SLEEP CENTER | Facility: HOSPITAL | Age: 54
Discharge: HOME | End: 2019-05-31
Attending: FAMILY MEDICINE
Payer: COMMERCIAL

## 2019-05-31 DIAGNOSIS — G47.33 OBSTRUCTIVE SLEEP APNEA SYNDROME: ICD-10-CM

## 2019-05-31 PROCEDURE — 95810 POLYSOM 6/> YRS 4/> PARAM: CPT

## 2019-06-05 ENCOUNTER — TELEPHONE (OUTPATIENT)
Dept: SLEEP CENTER | Facility: CLINIC | Age: 54
End: 2019-06-05

## 2019-06-05 DIAGNOSIS — K90.49 FATTY FOOD INTOLERANCE: ICD-10-CM

## 2019-06-05 RX ORDER — RANITIDINE 150 MG/1
150 TABLET ORAL 2 TIMES DAILY
Qty: 60 TABLET | Refills: 5 | Status: SHIPPED | OUTPATIENT
Start: 2019-06-05 | End: 2019-07-25

## 2019-06-05 RX ORDER — RANITIDINE 150 MG/1
TABLET ORAL
Qty: 60 TABLET | Refills: 5 | Status: SHIPPED | OUTPATIENT
Start: 2019-06-05 | End: 2019-06-05 | Stop reason: SDUPTHER

## 2019-06-12 DIAGNOSIS — G47.33 OSA (OBSTRUCTIVE SLEEP APNEA): Primary | ICD-10-CM

## 2019-06-21 ENCOUNTER — OFFICE VISIT (OUTPATIENT)
Dept: FAMILY MEDICINE CLINIC | Facility: CLINIC | Age: 54
End: 2019-06-21
Payer: COMMERCIAL

## 2019-06-21 VITALS
RESPIRATION RATE: 16 BRPM | DIASTOLIC BLOOD PRESSURE: 74 MMHG | OXYGEN SATURATION: 94 % | SYSTOLIC BLOOD PRESSURE: 112 MMHG | BODY MASS INDEX: 33.82 KG/M2 | WEIGHT: 203 LBS | HEART RATE: 84 BPM | HEIGHT: 65 IN

## 2019-06-21 DIAGNOSIS — K90.49 FATTY FOOD INTOLERANCE: Primary | ICD-10-CM

## 2019-06-21 DIAGNOSIS — E78.2 MIXED HYPERLIPIDEMIA: ICD-10-CM

## 2019-06-21 DIAGNOSIS — I25.10 ASCVD (ARTERIOSCLEROTIC CARDIOVASCULAR DISEASE): ICD-10-CM

## 2019-06-21 DIAGNOSIS — Z95.5 S/P BARE METAL CORONARY ARTERY STENT: ICD-10-CM

## 2019-06-21 PROCEDURE — 99214 OFFICE O/P EST MOD 30 MIN: CPT | Performed by: FAMILY MEDICINE

## 2019-07-01 DIAGNOSIS — I10 ESSENTIAL HYPERTENSION: Primary | ICD-10-CM

## 2019-07-01 RX ORDER — AMLODIPINE BESYLATE 5 MG/1
5 TABLET ORAL DAILY
Qty: 90 TABLET | Refills: 1 | Status: SHIPPED | OUTPATIENT
Start: 2019-07-01 | End: 2019-12-29 | Stop reason: SDUPTHER

## 2019-07-03 ENCOUNTER — CONSULT (OUTPATIENT)
Dept: GASTROENTEROLOGY | Facility: HOSPITAL | Age: 54
End: 2019-07-03
Payer: COMMERCIAL

## 2019-07-03 VITALS
HEIGHT: 65 IN | HEART RATE: 74 BPM | SYSTOLIC BLOOD PRESSURE: 126 MMHG | TEMPERATURE: 98 F | WEIGHT: 201.8 LBS | BODY MASS INDEX: 33.62 KG/M2 | DIASTOLIC BLOOD PRESSURE: 74 MMHG

## 2019-07-03 DIAGNOSIS — R10.13 DYSPEPSIA: ICD-10-CM

## 2019-07-03 DIAGNOSIS — K90.49 FATTY FOOD INTOLERANCE: ICD-10-CM

## 2019-07-03 DIAGNOSIS — Z12.11 SCREENING FOR COLON CANCER: ICD-10-CM

## 2019-07-03 DIAGNOSIS — K21.9 GASTROESOPHAGEAL REFLUX DISEASE, ESOPHAGITIS PRESENCE NOT SPECIFIED: Primary | ICD-10-CM

## 2019-07-03 DIAGNOSIS — R93.5 ABNORMAL FINDINGS ON DIAGNOSTIC IMAGING OF ABDOMEN: ICD-10-CM

## 2019-07-03 PROCEDURE — 99245 OFF/OP CONSLTJ NEW/EST HI 55: CPT | Performed by: INTERNAL MEDICINE

## 2019-07-03 RX ORDER — OMEPRAZOLE 40 MG/1
40 CAPSULE, DELAYED RELEASE ORAL DAILY
Qty: 90 CAPSULE | Refills: 3 | Status: SHIPPED | OUTPATIENT
Start: 2019-07-03 | End: 2020-02-06 | Stop reason: SDUPTHER

## 2019-07-03 NOTE — H&P
[facility] - Outpatient Consultation  Hyun Nunez 48 y o  female MRN: [id number]  Encounter: [id number]          ASSESSMENT AND PLAN:      1  Abdominal bloating  2  Heartburn, Gastroesophageal reflux disease  3  Weight loss, lack of appetite  Smaller, frequent meals  Continue Ranitidine but only once daily at night time    Will schedule for upper endoscopy to evaluate for reflux,  h pylori, and celiac disease  Start Omeprazole 40 mg once daily, take half hour before breakfast     4  [place]-rectal cancer screening    She has not had a colonoscopy before, will schedule for colonoscopy at the same time  ______________________________________________________________    HPI:  Hyun Nunez is a 48y o  year old female who presents to the office for evaluation of abdominal pain and food intolerance  Continues to have epigastric pain, burning when eating spicy foods, and bloating  She says she has lost almost 20 lbs due to not eating as much  She has switched from coffee to water, but still continues to drink soda  She has issues with gravy and spaghetti sauces  She becomes bloated and has some burning in the upper abdomen/lower chest   Does well with milk and yogurts  No family history of colon cancer  No blood in her stool    She will be going to visit her daughter in [place], both son and daughter wlil be deployed in [employer] soon  She had an MRI of the abdomen with 6 mm hypervascular lesion in the posterior right hepatic lobe  HIDA scan for gallbladder was normal   I have personally viewed the images in PACS  REVIEW OF SYSTEMS:    CONSTITUTIONAL: Denies any fever, chills, rigors, + weight loss  HEENT: No earache or tinnitus  Denies hearing loss or visual disturbances  CARDIOVASCULAR: No chest pain or palpitations  RESPIRATORY: Denies any cough, hemoptysis, shortness of breath or dyspnea on exertion    GASTROINTESTINAL: As noted in the History of Present Illness  GENITOURINARY: No problems with urination  Denies any hematuria or dysuria  NEUROLOGIC: No dizziness or vertigo, denies headaches  MUSCULOSKELETAL: Denies any muscle or joint pain  SKIN: Denies skin rashes or itching  ENDOCRINE: Denies excessive thirst  Denies intolerance to heat or cold  PSYCHOSOCIAL: Denies depression or anxiety  Denies any recent memory loss  Historical Information   Past Medical History:   Diagnosis Date    Anxiety      Past Surgical History:   Procedure Laterality Date    TOE SURGERY      TUBAL LIGATION       Social History   Social History     Substance and Sexual Activity   Alcohol Use No     Social History     Substance and Sexual Activity   Drug Use No     Social History     Tobacco Use   Smoking Status Current Every Day Smoker   Smokeless Tobacco Never Used     Family History   Problem Relation Age of Onset    Atrial fibrillation Mother     Heart failure Mother     Diabetes type II Mother     Hypertension Mother     Lung disease Mother         Pleural Effusion    Hypertension Father        Meds/Allergies       Current Outpatient Medications:     ALPRAZolam (XANAX) 1 mg tablet    amLODIPine (NORVASC) 5 mg tablet    aspirin (ASPIRIN ADULT LOW DOSE) 81 mg EC tablet    atorvastatin (LIPITOR) 80 mg tablet    metoprolol tartrate (LOPRESSOR) 50 mg tablet    ranitidine (ZANTAC) 150 mg tablet    Allergies   Allergen Reactions    Morphine Confusion and Delerium           Objective     Blood pressure 126/74, pulse 74, temperature 98 °F (36 7 °C), temperature source Tympanic, height 5' 5" (1 651 m), weight 91 5 kg (201 lb 12 8 oz)  Body mass index is 33 58 kg/m²  PHYSICAL EXAM:      General Appearance:   Alert, cooperative, no distress   HEENT:   Normocephalic, atraumatic, anicteric       Neck:  Supple, symmetrical, trachea midline   Lungs:   Clear to auscultation bilaterally; no rales, rhonchi or wheezing; respirations unlabored    Heart[de-identified]   Regular rate and rhythm; no murmur, rub, or gallop  Abdomen:   Soft, non-tender, non-distended; normal bowel sounds; no masses, no organomegaly    Genitalia:   Deferred    Rectal:   Deferred    Extremities:  No cyanosis, clubbing or edema    Pulses:  2+ and symmetric    Skin:  No jaundice, rashes, or lesions    Lymph nodes:  No palpable cervical lymphadenopathy        Lab Results:   No visits with results within 1 Day(s) from this visit  Latest known visit with results is:   Hospital Outpatient Visit on 02/13/2019   Component Date Value    Protocol Name 02/13/2019 LEXISCAN SIT     Time In Exercise Phase 02/13/2019 00:03:00     MAX  SYSTOLIC BP 05/65/1945 928     Max Diastolic Bp 09/64/8060 70     Max Heart Rate 02/13/2019 93     Max Predicted Heart Rate 02/13/2019 169     Reason for Termination 02/13/2019 Test Complete  [place] Form Test Indication 02/13/2019  NSTEMI     Target Hr Formular 02/13/2019 (220 - Age)*85%     Chest Pain Statement 02/13/2019 non-limiting          Radiology Results:   No results found

## 2019-07-03 NOTE — PATIENT INSTRUCTIONS
Schedule patient with Dr Vieyra on August 9, 2019   @ [facility] for a Colonoscopy/EGD  Follow up in 6 months with PA   Gave instructions for   Miralax/Dulcolax prep

## 2019-07-03 NOTE — PROGRESS NOTES
[facility] - Outpatient Consultation  Tere Kelsey 48 y o  female MRN: [id number]  Encounter: [id number]          ASSESSMENT AND PLAN:      1  Abdominal bloating  2  Heartburn, Gastroesophageal reflux disease  3  Weight loss, lack of appetite  4  Dyspepsia  Smaller, frequent meals  Continue Ranitidine but only once daily at night time    Will schedule for upper endoscopy to evaluate for reflux,  h pylori, and celiac disease  Start Omeprazole 40 mg once daily, take half hour before breakfast     4  [place]-rectal cancer screening    She has not had a colonoscopy before, will schedule for colonoscopy at the same time  ______________________________________________________________    HPI:  Tere Kelsey is a 48y o  year old female who presents to the office for evaluation of abdominal pain and food intolerance  Continues to have epigastric pain, burning when eating spicy foods, and bloating  She says she has lost almost 20 lbs due to not eating as much  She has switched from coffee to water, but still continues to drink soda  She has issues with gravy and spaghetti sauces  She becomes bloated and has some burning in the upper abdomen/lower chest   Does well with milk and yogurts  No family history of colon cancer  No blood in her stool    She will be going to visit her daughter in [place], both son and daughter wlil be deployed in [employer] soon  She had an MRI of the abdomen with 6 mm hypervascular lesion in the posterior right hepatic lobe  HIDA scan for gallbladder was normal   I have personally viewed the images in PACS  REVIEW OF SYSTEMS:    CONSTITUTIONAL: Denies any fever, chills, rigors, + weight loss  HEENT: No earache or tinnitus  Denies hearing loss or visual disturbances  CARDIOVASCULAR: No chest pain or palpitations  RESPIRATORY: Denies any cough, hemoptysis, shortness of breath or dyspnea on exertion    GASTROINTESTINAL: As noted in the History of Present Illness  GENITOURINARY: No problems with urination  Denies any hematuria or dysuria  NEUROLOGIC: No dizziness or vertigo, denies headaches  MUSCULOSKELETAL: Denies any muscle or joint pain  SKIN: Denies skin rashes or itching  ENDOCRINE: Denies excessive thirst  Denies intolerance to heat or cold  PSYCHOSOCIAL: Denies depression or anxiety  Denies any recent memory loss  Historical Information   Past Medical History:   Diagnosis Date    Anxiety      Past Surgical History:   Procedure Laterality Date    TOE SURGERY      TUBAL LIGATION       Social History   Social History     Substance and Sexual Activity   Alcohol Use No     Social History     Substance and Sexual Activity   Drug Use No     Social History     Tobacco Use   Smoking Status Current Every Day Smoker   Smokeless Tobacco Never Used     Family History   Problem Relation Age of Onset    Atrial fibrillation Mother     Heart failure Mother     Diabetes type II Mother     Hypertension Mother     Lung disease Mother         Pleural Effusion    Hypertension Father        Meds/Allergies       Current Outpatient Medications:     ALPRAZolam (XANAX) 1 mg tablet    amLODIPine (NORVASC) 5 mg tablet    aspirin (ASPIRIN ADULT LOW DOSE) 81 mg EC tablet    atorvastatin (LIPITOR) 80 mg tablet    metoprolol tartrate (LOPRESSOR) 50 mg tablet    ranitidine (ZANTAC) 150 mg tablet    Allergies   Allergen Reactions    Morphine Confusion and Delerium           Objective     Blood pressure 126/74, pulse 74, temperature 98 °F (36 7 °C), temperature source Tympanic, height 5' 5" (1 651 m), weight 91 5 kg (201 lb 12 8 oz)  Body mass index is 33 58 kg/m²  PHYSICAL EXAM:      General Appearance:   Alert, cooperative, no distress   HEENT:   Normocephalic, atraumatic, anicteric       Neck:  Supple, symmetrical, trachea midline   Lungs:   Clear to auscultation bilaterally; no rales, rhonchi or wheezing; respirations unlabored    Heart[de-identified] Regular rate and rhythm; no murmur, rub, or gallop  Abdomen:   Soft, non-tender, non-distended; normal bowel sounds; no masses, no organomegaly    Genitalia:   Deferred    Rectal:   Deferred    Extremities:  No cyanosis, clubbing or edema    Pulses:  2+ and symmetric    Skin:  No jaundice, rashes, or lesions    Lymph nodes:  No palpable cervical lymphadenopathy        Lab Results:   No visits with results within 1 Day(s) from this visit  Latest known visit with results is:   Hospital Outpatient Visit on 02/13/2019   Component Date Value    Protocol Name 02/13/2019 LEXISCAN SIT     Time In Exercise Phase 02/13/2019 00:03:00     MAX  SYSTOLIC BP 97/37/2824 375     Max Diastolic Bp 96/19/7987 70     Max Heart Rate 02/13/2019 93     Max Predicted Heart Rate 02/13/2019 169     Reason for Termination 02/13/2019 Test Complete  [place] Gals Test Indication 02/13/2019  NSTEMI     Target Hr Formular 02/13/2019 (220 - Age)*85%     Chest Pain Statement 02/13/2019 non-limiting          Radiology Results:   No results found

## 2019-07-17 ENCOUNTER — TELEPHONE (OUTPATIENT)
Dept: GASTROENTEROLOGY | Facility: AMBULARY SURGERY CENTER | Age: 54
End: 2019-07-17

## 2019-07-25 ENCOUNTER — OFFICE VISIT (OUTPATIENT)
Dept: FAMILY MEDICINE CLINIC | Facility: CLINIC | Age: 54
End: 2019-07-25
Payer: COMMERCIAL

## 2019-07-25 VITALS
DIASTOLIC BLOOD PRESSURE: 78 MMHG | SYSTOLIC BLOOD PRESSURE: 120 MMHG | HEIGHT: 65 IN | OXYGEN SATURATION: 98 % | RESPIRATION RATE: 18 BRPM | TEMPERATURE: 97.8 F | HEART RATE: 72 BPM | BODY MASS INDEX: 32.99 KG/M2 | WEIGHT: 198 LBS

## 2019-07-25 DIAGNOSIS — S93.401D SPRAIN OF RIGHT ANKLE, UNSPECIFIED LIGAMENT, SUBSEQUENT ENCOUNTER: ICD-10-CM

## 2019-07-25 DIAGNOSIS — K90.49 FATTY FOOD INTOLERANCE: ICD-10-CM

## 2019-07-25 DIAGNOSIS — Z09 ENCOUNTER FOR EXAMINATION FOLLOWING TREATMENT AT HOSPITAL: Primary | ICD-10-CM

## 2019-07-25 PROCEDURE — 99213 OFFICE O/P EST LOW 20 MIN: CPT | Performed by: NURSE PRACTITIONER

## 2019-07-25 PROCEDURE — 3008F BODY MASS INDEX DOCD: CPT | Performed by: NURSE PRACTITIONER

## 2019-07-25 RX ORDER — RANITIDINE 150 MG/1
150 TABLET ORAL DAILY
Start: 2019-07-25 | End: 2020-02-06

## 2019-07-25 NOTE — PROGRESS NOTES
Assessment/Plan:     Diagnoses and all orders for this visit:    Encounter for examination following treatment at hospital    Sprain of right ankle, unspecified ligament, subsequent encounter    Fatty food intolerance  -     ranitidine (ZANTAC) 150 mg tablet; Take 1 tablet (150 mg total) by mouth daily        Subjective:      Patient ID: Chung Bundy is a 48 y o  female  Patient presents to [address] [place] [address] after treatment at ER  Allergies, medical history and current medications reviewed with patient; patient reports taking all medications as prescribed without issues  Patient was seen and treated by [facility] on 7/19/19, where she was diagnosed with and treated for a sprained right ankle  Patient states she tripped over a rock and twisted her ankle  Patient was referred to [address] [place] [address]; patient states she is scheduled for initial evaluation on 8/19/19  Today, patient reports she continues to experience pain in the right ankle and lower extremity, and has been using OTC Tylenol for pain relief; patient was using Tramadol as prescribed by ER, but prefers Tylenol due to side effects of Tramadol  Patient denies any concerns at this time; all questions answered  Patient Care Team:  Stanley Avilez MD as PCP - [address] [place] [address] E, DO (Cardiology)  Rick Dallas MD (Cardiology)    Review of Systems   Musculoskeletal: Positive for arthralgias  All other systems reviewed and are negative  Objective:    /78 (BP Location: Left arm, Patient Position: Sitting, Cuff Size: Large)   Pulse 72   Temp 97 8 °F (36 6 °C) (Temporal)   Resp 18   Ht 5' 5" (1 651 m)   Wt 89 8 kg (198 lb)   SpO2 98%   BMI 32 95 kg/m²      Physical Exam   Constitutional: She is oriented to person, place, and time  She appears well-developed and well-nourished  No distress  HENT:   Head: Normocephalic and atraumatic     Eyes: Conjunctivae and lids are normal    Neck: Normal range of motion  No tracheal deviation present  Cardiovascular: Normal rate, regular rhythm, S1 normal and S2 normal  Exam reveals distant heart sounds  Pulmonary/Chest: Effort normal and breath sounds normal  No respiratory distress  Abdominal: Normal appearance  She exhibits no distension  There is no guarding  Musculoskeletal: She exhibits no edema or deformity  Right ankle: She exhibits decreased range of motion (boot in place and intact)  Tenderness  Neurological: She is alert and oriented to person, place, and time  Skin: Skin is warm, dry and intact  Psychiatric: She has a normal mood and affect  Her speech is normal    Nursing note and vitals reviewed

## 2019-07-25 NOTE — PATIENT INSTRUCTIONS
Ankle Sprain   WHAT YOU NEED TO KNOW:   What is an ankle sprain? An ankle sprain happens when 1 or more ligaments in your ankle joint stretch or tear  Ligaments are tough tissues that connect bones  Ligaments support your joints and keep your bones in place  What causes an ankle sprain? An ankle sprain is usually caused by a direct injury or sudden twisting of the joint  This may happen while playing sports, or may be due to a fall  If you have problems with balance, or have weak muscles or ligaments, you are more likely to sprain your ankle  What are the signs and symptoms of an ankle sprain? · Trouble moving your ankle or foot    · Pain when you touch or put weight on your ankle    · Bruised, swollen, or misshapen ankle  How is an ankle sprain diagnosed? Your healthcare provider will ask you about your injury and examine you  Tell him if you heard a snap or pop when you were injured  Your healthcare provider will check the movement and strength of your joint  You may be asked to move the joint yourself  Tell a healthcare provider if you have ever had an allergic reaction to contrast liquid  You may need any of the following:  · A joint x-ray  is a picture of the bones and tissues in your joints  You may be given contrast liquid as a shot into your joint before the x-ray  This contrast liquid will help your joint show up better on the x-ray  A joint x-ray with contrast liquid is called an arthrogram     · An MRI  may show the sprain  You may be given contrast liquid to help the pictures show up better  Do not enter the MRI room with anything metal  Metal can cause serious injury  Tell a healthcare provider if you have any metal in or on your body  How is an ankle sprain treated? · Support devices,  such as a brace, cast, or splint, may be needed to limit your movement and protect your joint  You may need to use crutches to decrease your pain as you move around       · Medicines      ¨ NSAIDs , such as ibuprofen, help decrease swelling, pain, and fever  This medicine is available with or without a doctor's order  NSAIDs can cause stomach bleeding or kidney problems in certain people  If you take blood thinner medicine, always ask your healthcare provider if NSAIDs are safe for you  Always read the medicine label and follow directions  ¨ Acetaminophen  decreases pain  It is available without a doctor's order  Ask how much to take and how often to take it  Follow directions  Acetaminophen can cause liver damage if not taken correctly  ¨ Prescription pain medicine  may be given  Ask how to take this medicine safely  · Physical therapy  may be recommended  A physical therapist teaches you exercises to help improve movement and strength, and to decrease pain  · Surgery  may be needed to repair or replace a torn ligament if your sprain does not heal with other treatments  Your healthcare provider may use screws to attach the bones in your ankle together  The screws may help support your ankle and make it stable  Ask your healthcare provider for more information about surgery to treat your ankle sprain  How can I manage my ankle sprain? · Rest  your ankle so that it can heal  Return to normal activities as directed  · Apply ice on your ankle for 15 to 20 minutes every hour or as directed  Use an ice pack, or put crushed ice in a plastic bag  Cover it with a towel  Ice helps prevent tissue damage and decreases swelling and pain  · Compress  your ankle  Ask if you should wrap an elastic bandage around your injured ligament  An elastic bandage provides support and helps decrease swelling and movement so your joint can heal  Wear as long as directed  · Elevate  your ankle above the level of your heart as often as you can  This will help decrease swelling and pain  Prop your ankle on pillows or blankets to keep it elevated comfortably  How can I prevent another ankle sprain?    · Let your ankle heal   Find out how long your ligament needs to heal  Do not do any physical activity until your healthcare provider says it is okay  If you start activity too soon, you may develop a more serious injury  · Always warm up and stretch  before you exercise or play sports  · Use the right equipment  Always wear shoes that fit well and are made for the activity that you are doing  You may also need ankle supports, elbow and knee pads, or braces  When should I seek immediate care? · You have severe pain in your ankle  · Your foot or toes are cold or numb  · Your ankle becomes more weak or unstable (wobbly)  · You are unable to put any weight on your ankle or foot  · Your swelling has increased or returned  When should I contact my healthcare provider? · Your pain does not go away, even after treatment  · You have questions or concerns about your condition or care  CARE AGREEMENT:   You have the right to help plan your care  Learn about your health condition and how it may be treated  Discuss treatment options with your caregivers to decide what care you want to receive  You always have the right to refuse treatment  The above information is an  only  It is not intended as medical advice for individual conditions or treatments  Talk to your doctor, nurse or pharmacist before following any medical regimen to see if it is safe and effective for you  © 2017 [employer] Information is for End User's use only and may not be sold, redistributed or otherwise used for commercial purposes  All illustrations and images included in CareNotes® are the copyrighted property of [employer]  or [employer]i [employer]

## 2019-07-26 ENCOUNTER — TELEPHONE (OUTPATIENT)
Dept: GASTROENTEROLOGY | Facility: AMBULARY SURGERY CENTER | Age: 54
End: 2019-07-26

## 2019-07-26 NOTE — TELEPHONE ENCOUNTER
giovannae patient        She has colon 8-16-19 and needs help with her prep instructions      Call back # [phone]

## 2019-08-01 ENCOUNTER — OFFICE VISIT (OUTPATIENT)
Dept: SLEEP CENTER | Facility: CLINIC | Age: 54
End: 2019-08-01
Payer: COMMERCIAL

## 2019-08-01 VITALS
BODY MASS INDEX: 36.8 KG/M2 | HEIGHT: 62 IN | OXYGEN SATURATION: 95 % | SYSTOLIC BLOOD PRESSURE: 112 MMHG | DIASTOLIC BLOOD PRESSURE: 76 MMHG | WEIGHT: 200 LBS | HEART RATE: 81 BPM

## 2019-08-01 DIAGNOSIS — G47.69 SLEEP RELATED RHYTHMIC MOVEMENT DISORDER: ICD-10-CM

## 2019-08-01 DIAGNOSIS — G47.09 OTHER INSOMNIA: ICD-10-CM

## 2019-08-01 DIAGNOSIS — G47.33 OSA (OBSTRUCTIVE SLEEP APNEA): Primary | ICD-10-CM

## 2019-08-01 DIAGNOSIS — K21.9 GASTROESOPHAGEAL REFLUX DISEASE, ESOPHAGITIS PRESENCE NOT SPECIFIED: ICD-10-CM

## 2019-08-01 DIAGNOSIS — I25.119 CORONARY ARTERY DISEASE INVOLVING NATIVE CORONARY ARTERY OF NATIVE HEART WITH ANGINA PECTORIS (HCC): ICD-10-CM

## 2019-08-01 DIAGNOSIS — F51.3 SLEEP WALKING AND EATING: ICD-10-CM

## 2019-08-01 DIAGNOSIS — R00.2 PALPITATIONS: ICD-10-CM

## 2019-08-01 DIAGNOSIS — G47.34 SLEEP RELATED HYPOXIA: ICD-10-CM

## 2019-08-01 DIAGNOSIS — G47.19 EXCESSIVE DAYTIME SLEEPINESS: ICD-10-CM

## 2019-08-01 DIAGNOSIS — Z72.0 TOBACCO ABUSE: ICD-10-CM

## 2019-08-01 DIAGNOSIS — F41.9 ANXIETY: ICD-10-CM

## 2019-08-01 PROCEDURE — 99244 OFF/OP CNSLTJ NEW/EST MOD 40: CPT | Performed by: INTERNAL MEDICINE

## 2019-08-01 RX ORDER — TRAZODONE HYDROCHLORIDE 50 MG/1
50 TABLET ORAL
Qty: 30 TABLET | Refills: 2 | Status: SHIPPED | OUTPATIENT
Start: 2019-08-01 | End: 2019-10-29 | Stop reason: SDUPTHER

## 2019-08-01 NOTE — PROGRESS NOTES
Consultation - [place] [id number] [place]  48 y o  female  :1965  RMN:[id number]    Physician Requesting Consult: Juan Jaimes MD     Reason for Consult : At your kind request I saw this patient for initial sleep evaluation today  Patient recently had a diagnostic sleep study and is here to review results / treatment options  The study demonstrated: No respiratory events could be scored on the study night because of episodic artifact in airflow channels  Snoring of loud intensity was noted    Minimum oxygen saturation 83 %  and 88 5% of total sleep time was spent with saturations less than 90%  Sleep efficiency was 70 6%  Sleep latency was prolonged and she had difficulty maintaining sleep  PFSH, Problem List, Medications & Allergies were reviewed in EMR  She  has a past medical history of Anxiety  She has a current medication list which includes the following prescription(s): alprazolam, amlodipine, aspirin, atorvastatin, metoprolol tartrate, omeprazole, peg [id number], ranitidine, and trazodone  HPI:  Study was undertaken because of her history of sleep difficulties ongoing for many years and gotten worse  She also has multiple medical problems  Recently she has been experiencing abdominal pains that affect sleep  She Has a history of coronary artery disease for which she has a stent but recent cardiac workup that has been unrevealing  She sleeps alone, but has been told that she snores, it is loud and disturbs others  At times she awakens herself with snoring or snorting  She is not aware of modifying factors  Other Complaints:  Exhaustion and excessive daytime sleepiness  Restless Leg Syndrome: reports no suggestive symptoms but rocks her body or legs to assist falling asleep  She volunteered I" have done this since I was very very young     Parasomnia:  She is suspects sleep walking and sleep eating as she has found things misplaced, cabinets open in the kitchen and food in her bed  Sleep Routine:   Typical Bedtime:  10:00 p m  To midnight Gets OOB:  4:00 a m  TIB:4-6 hrs  Sleep latency:< 15 minutes Sleep Interruptions:3-5 x/night is not always sure of the cause but is able to fall back asleep  Awakens: spontaneously  Upon awakening:usually feels refreshed She estimates getting ? hrs sleep  She has Excessive Daytime Sleepiness and dozes of unintentionally for approximately 2 hours but at times up to 5 hours  [place] Sleepiness Scale rated at Total score: 22 /24  Habits: reports that she has been smoking  She has never used smokeless tobacco , reports that she does not drink alcohol ,  reports that she does not use drugs  ,Caffeine use:moderate until around 5:00 p m , Exercise routine: irregular   Family History: Negative for sleep disturbance  ROS: reviewed & as attached  Significant for approximately 13 lb weight loss during her recent hospitalization for suspected MI  She has nasal congestion upon awakening  She reports chest tightness wheezing and has shortness of breath  She reports random palpitations  She has acid reflux  She has anxiety for which she takes alprazolam    She has musculoskeletal aches and pains  EXAM:    Vitals /76   Pulse 81   Ht 5' 2" (1 575 m)   Wt 90 7 kg (200 lb)   SpO2 95%   BMI 36 58 kg/m²     General  Well groomed female; appears stated age; no apparent distress  Psychiatric   Speech:clear and coherent; Cooperative  Anxious, Depressed mood   Head   Craniofacial anatomy:retrognathia Sinuses: non- tender  TMJ: Normal     Eyes   EOM's intact;  conjunctiva/corneas clear         Nasal Airway  is patent Septum:central, Mucous membranes:appear normal     Turbinates: normal ;  No Rhinorrhea; No PND  Oral   Airway  crowded Tongue:  ; Modified Mallampati class IV (only hard palate visible)  Hard Palate:normal ;Soft Palate:  redundant soft palate Tonsils: no hypertrophy  Teeth: edentulous and dentures - upper  Neck  appears thick; Neck Circumference: 39 cm; Supple; no abnormal masses; Thyroid:normal  Trachea:central      Lymph    No Cervical or Submandibular Lymhadenopathy   Heart:   S1,S2 normal;RRR; no gallop; nomurmurs     Lungs   Respiratory Effort:normal  Air entry reduced bilaterally  No wheezes  No rales   Abdomen   Obese, Soft & non-tender     Extremities   No pedal edema  No clubbing or cyanosis  Skin   Skin is warm and dry; Color& Hydration good; no facial rashes or lesions    Neurologic  Alert and orientated; CNII-XII intact; Motor normal; Sensation normal    Muscskeltl    Muscle bulk, tone and power WNL Gait:normal         IMPRESSION: Primary Sleep/Secondary(to Medical or Psych conditions) & comorbidities   1  ALEX (obstructive sleep apnea)  Ambulatory referral to Sleep Medicine    Diagnostic Sleep Study    CPAP Study   2  Sleep related hypoxia  Diagnostic Sleep Study    CPAP Study   3  Other insomnia     4  Excessive daytime sleepiness     5  Sleep walking and eating     6  Sleep related rhythmic movement disorder     7  Anxiety  traZODone (DESYREL) 50 mg tablet   8  Tobacco abuse     9  Coronary artery disease involving native coronary artery of native heart with angina pectoris ([facility] [address] )     10  Palpitations     11  BMI 34 0-34 9,adult     12  Gastroesophageal reflux disease, esophagitis presence not specified        PLAN:   1  Comprehensive counseling was provided on pathophysiology, diagnostic strategies & treatment options; effects on symptoms and comorbidities; risks of inadequate therapy; costs and insurance aspects  2  I advised on weight reduction, avoiding sleeping supine, using alcohol or sedating medications close to bed time and on safe driving practices  I also advised on safety precaution with respect to her sleep walking  3  Cognitive behavioral therapy was initiated with advise on Sleep Hygiene and behavioral techniques to manage Insomnia    Specifically, keeping a regular sleep routine, starting an exercise program, limiting time in bed to 6 hours, avoiding watching TV in bed, relaxation techniques, avoiding caffeine after 3:00 p m  Or smoking close to bedtime and preferably smoking cessation  4  Nocturnal polysomnography is indicated and a diagnostic study will be scheduled  5  Patient is willing to try Positive airway pressure therapy and will be scheduled for a titration study if indicated  6   I gave her a prescription for trazodone to address her anxiety that would also assist with sleep  She was advised to limit her use of alprazolam   7    Consideration should be given to pulmonary function testing  8  Follow-up will be scheduled after the studies to review results, further details of treatment options and to initiate/adjust therapy  Thank you for allowing me to participate in the care of this patient  I will keep you apprised of developments      Sincerely,     Authenticated electronically by Michelle Forbes MD   on 24/32/33   Board Certified Specialist

## 2019-08-01 NOTE — PROGRESS NOTES
Review of Systems      Genitourinary need to urinate more than twice a night and hot flashes at night   Cardiology Frequent chest pain or angina, , palpitations/fluttering feeling in the chest and ankle/leg swelling   Gastrointestinal frequent heartburn/acid reflux   Neurology awaken with headache, need to move extremities, numbness/tingling of an extremity, forgetfulness, poor concentration or confusion,  and balance problems   Constitutional fatigue and excessive sweating at night   Integumentary none   Psychiatry anxiety   Musculoskeletal joint pain, muscle aches, back pain and leg cramps   Pulmonary shortness of breath with activity, chest tightness, wheezing and snoring   ENT none   Endocrine frequent urination   Hematological none

## 2019-08-08 ENCOUNTER — TELEPHONE (OUTPATIENT)
Dept: GASTROENTEROLOGY | Facility: HOSPITAL | Age: 54
End: 2019-08-08

## 2019-08-08 NOTE — TELEPHONE ENCOUNTER
Patient received Golytely as prep at her pharmacy  She received Miralax/Dolcolax instructions at time of visit which is also on last visit summary  Explained to patient that she must  Dulcolax tables over the counter from the store  The ones she already picked up (she stated pharmacy told her to grab) from the pharmacy as per [facility] are incorrect  I also made her aware that it is very important to follow our directions, (that were emailed to her previously), and not to use the directions given by the pharmacy  Patient states that she does not have any kidney issues  Please advise that Golytely prep is ok for patient to move forward with

## 2019-08-12 NOTE — TELEPHONE ENCOUNTER
Patient stated she bought dulcolax tablets, but they are pink  They are for sensitive stomach  Is this ok for her to use? Please advise

## 2019-08-15 ENCOUNTER — ANESTHESIA EVENT (OUTPATIENT)
Dept: PERIOP | Facility: HOSPITAL | Age: 54
End: 2019-08-15

## 2019-08-16 ENCOUNTER — ANESTHESIA (OUTPATIENT)
Dept: PERIOP | Facility: HOSPITAL | Age: 54
End: 2019-08-16

## 2019-08-16 ENCOUNTER — HOSPITAL ENCOUNTER (OUTPATIENT)
Dept: PERIOP | Facility: HOSPITAL | Age: 54
Setting detail: OUTPATIENT SURGERY
Discharge: HOME | End: 2019-08-16
Attending: INTERNAL MEDICINE | Admitting: INTERNAL MEDICINE
Payer: COMMERCIAL

## 2019-08-16 VITALS
OXYGEN SATURATION: 99 % | WEIGHT: 200 LBS | DIASTOLIC BLOOD PRESSURE: 70 MMHG | SYSTOLIC BLOOD PRESSURE: 136 MMHG | HEIGHT: 62 IN | TEMPERATURE: 98 F | HEART RATE: 76 BPM | RESPIRATION RATE: 18 BRPM | BODY MASS INDEX: 36.8 KG/M2

## 2019-08-16 DIAGNOSIS — K90.49 FATTY FOOD INTOLERANCE: ICD-10-CM

## 2019-08-16 DIAGNOSIS — R10.13 DYSPEPSIA: ICD-10-CM

## 2019-08-16 DIAGNOSIS — K21.9 GASTROESOPHAGEAL REFLUX DISEASE, ESOPHAGITIS PRESENCE NOT SPECIFIED: ICD-10-CM

## 2019-08-16 DIAGNOSIS — Z12.11 SCREENING FOR COLON CANCER: ICD-10-CM

## 2019-08-16 PROCEDURE — 88342 IMHCHEM/IMCYTCHM 1ST ANTB: CPT | Performed by: PATHOLOGY

## 2019-08-16 PROCEDURE — 88305 TISSUE EXAM BY PATHOLOGIST: CPT | Performed by: PATHOLOGY

## 2019-08-16 PROCEDURE — 45380 COLONOSCOPY AND BIOPSY: CPT | Performed by: INTERNAL MEDICINE

## 2019-08-16 PROCEDURE — 43239 EGD BIOPSY SINGLE/MULTIPLE: CPT | Performed by: INTERNAL MEDICINE

## 2019-08-16 RX ORDER — SODIUM CHLORIDE, SODIUM LACTATE, POTASSIUM CHLORIDE, CALCIUM CHLORIDE 600; 310; 30; 20 MG/100ML; MG/100ML; MG/100ML; MG/100ML
125 INJECTION, SOLUTION INTRAVENOUS CONTINUOUS
Status: DISCONTINUED | OUTPATIENT
Start: 2019-08-16 | End: 2019-08-20 | Stop reason: HOSPADM

## 2019-08-16 RX ORDER — HYDROMORPHONE HCL/PF 1 MG/ML
0.5 SYRINGE (ML) INJECTION
Status: DISCONTINUED | OUTPATIENT
Start: 2019-08-16 | End: 2019-08-20 | Stop reason: HOSPADM

## 2019-08-16 RX ORDER — MEPERIDINE HYDROCHLORIDE 25 MG/ML
12.5 INJECTION INTRAMUSCULAR; INTRAVENOUS; SUBCUTANEOUS AS NEEDED
Status: DISCONTINUED | OUTPATIENT
Start: 2019-08-16 | End: 2019-08-20 | Stop reason: HOSPADM

## 2019-08-16 RX ORDER — PROMETHAZINE HYDROCHLORIDE 25 MG/ML
12.5 INJECTION, SOLUTION INTRAMUSCULAR; INTRAVENOUS ONCE AS NEEDED
Status: DISCONTINUED | OUTPATIENT
Start: 2019-08-16 | End: 2019-08-20 | Stop reason: HOSPADM

## 2019-08-16 RX ORDER — PROPOFOL 10 MG/ML
INJECTION, EMULSION INTRAVENOUS AS NEEDED
Status: DISCONTINUED | OUTPATIENT
Start: 2019-08-16 | End: 2019-08-16 | Stop reason: SURG

## 2019-08-16 RX ORDER — SODIUM CHLORIDE, SODIUM LACTATE, POTASSIUM CHLORIDE, CALCIUM CHLORIDE 600; 310; 30; 20 MG/100ML; MG/100ML; MG/100ML; MG/100ML
125 INJECTION, SOLUTION INTRAVENOUS CONTINUOUS
Status: DISCONTINUED | OUTPATIENT
Start: 2019-08-16 | End: 2019-08-16

## 2019-08-16 RX ORDER — ALBUTEROL SULFATE 2.5 MG/3ML
2.5 SOLUTION RESPIRATORY (INHALATION) ONCE AS NEEDED
Status: DISCONTINUED | OUTPATIENT
Start: 2019-08-16 | End: 2019-08-20 | Stop reason: HOSPADM

## 2019-08-16 RX ORDER — FENTANYL CITRATE/PF 50 MCG/ML
25 SYRINGE (ML) INJECTION
Status: DISCONTINUED | OUTPATIENT
Start: 2019-08-16 | End: 2019-08-20 | Stop reason: HOSPADM

## 2019-08-16 RX ORDER — LABETALOL 20 MG/4 ML (5 MG/ML) INTRAVENOUS SYRINGE
5
Status: DISCONTINUED | OUTPATIENT
Start: 2019-08-16 | End: 2019-08-20 | Stop reason: HOSPADM

## 2019-08-16 RX ORDER — ONDANSETRON 2 MG/ML
4 INJECTION INTRAMUSCULAR; INTRAVENOUS ONCE AS NEEDED
Status: DISCONTINUED | OUTPATIENT
Start: 2019-08-16 | End: 2019-08-20 | Stop reason: HOSPADM

## 2019-08-16 RX ORDER — LIDOCAINE HYDROCHLORIDE 10 MG/ML
INJECTION, SOLUTION INFILTRATION; PERINEURAL AS NEEDED
Status: DISCONTINUED | OUTPATIENT
Start: 2019-08-16 | End: 2019-08-16 | Stop reason: SURG

## 2019-08-16 RX ADMIN — PROPOFOL 50 MG: 10 INJECTION, EMULSION INTRAVENOUS at 09:19

## 2019-08-16 RX ADMIN — PROPOFOL 50 MG: 10 INJECTION, EMULSION INTRAVENOUS at 09:23

## 2019-08-16 RX ADMIN — SODIUM CHLORIDE, SODIUM LACTATE, POTASSIUM CHLORIDE, AND CALCIUM CHLORIDE 125 ML/HR: .6; .31; .03; .02 INJECTION, SOLUTION INTRAVENOUS at 09:52

## 2019-08-16 RX ADMIN — PROPOFOL 100 MG: 10 INJECTION, EMULSION INTRAVENOUS at 09:05

## 2019-08-16 RX ADMIN — PROPOFOL 50 MG: 10 INJECTION, EMULSION INTRAVENOUS at 09:32

## 2019-08-16 RX ADMIN — PROPOFOL 50 MG: 10 INJECTION, EMULSION INTRAVENOUS at 09:15

## 2019-08-16 RX ADMIN — PROPOFOL 50 MG: 10 INJECTION, EMULSION INTRAVENOUS at 09:27

## 2019-08-16 RX ADMIN — PROPOFOL 50 MG: 10 INJECTION, EMULSION INTRAVENOUS at 09:08

## 2019-08-16 RX ADMIN — PROPOFOL 50 MG: 10 INJECTION, EMULSION INTRAVENOUS at 09:11

## 2019-08-16 RX ADMIN — SODIUM CHLORIDE, SODIUM LACTATE, POTASSIUM CHLORIDE, AND CALCIUM CHLORIDE 125 ML/HR: .6; .31; .03; .02 INJECTION, SOLUTION INTRAVENOUS at 07:51

## 2019-08-16 RX ADMIN — LIDOCAINE HYDROCHLORIDE 50 MG: 10 INJECTION, SOLUTION INFILTRATION; PERINEURAL at 09:05

## 2019-08-16 NOTE — DISCHARGE INSTRUCTIONS

## 2019-08-16 NOTE — H&P
History and Physical -  [facility]  Corrinne Rist 48 y o  female MRN: [id number]    HPI: Corrinne Rist is a 48y o  year old female who presents for EGD and colonoscopy she has heartburn, GERD and weight loss  Colonoscopy is for colon cancer screening      Review of Systems    Historical Information   Past Medical History:   Diagnosis Date    Angina pectoris ([facility] [place] [address] )     Anxiety     GERD (gastroesophageal reflux disease)     Myocardial infarction ([facility] )     1/24/16    Shortness of breath      Past Surgical History:   Procedure Laterality Date    CARDIAC SURGERY      CORONARY ANGIOPLASTY WITH STENT PLACEMENT      TOE SURGERY      TUBAL LIGATION      TUBAL LIGATION       Social History   Social History     Substance and Sexual Activity   Alcohol Use No     Social History     Substance and Sexual Activity   Drug Use No     Social History     Tobacco Use   Smoking Status Current Every Day Smoker   Smokeless Tobacco Never Used   Tobacco Comment    4 cigs daily     Family History   Problem Relation Age of Onset    Atrial fibrillation Mother     Heart failure Mother     Diabetes type II Mother     Hypertension Mother     Lung disease Mother         Pleural Effusion    Hypertension Father        Meds/Allergies       (Not in a hospital admission)    Allergies   Allergen Reactions    Morphine Confusion and Delerium       Objective     /70   Pulse 74   Temp 98 6 °F (37 °C) (Tympanic)   Resp 18   Ht 5' 2" (1 575 m)   Wt 90 7 kg (200 lb)   SpO2 96%   BMI 36 58 kg/m²       PHYSICAL EXAM    Gen: NAD  CV: RRR  CHEST: Clear  ABD: soft, NT/ND  EXT: no edema  Neuro: AAO      ASSESSMENT/PLAN:  This is a 48y o  year old female here for colonoscopy and EGD for evaluation of GERD, heartburn and weight loss    Colonoscopy for colon cancer screening    PLAN:   Procedure:  EGD and colonoscopy with biopsy and possible polypectomy

## 2019-08-16 NOTE — ANESTHESIA POSTPROCEDURE EVALUATION
Post-Op Assessment Note    CV Status:  Stable       Mental Status:  Arousable   Hydration Status:  Stable   PONV Controlled:  None   Airway Patency:  Patent   Post Op Vitals Reviewed: Yes      Staff: CRNA           BP  88/46    Temp      Pulse  79   Resp   20   SpO2   98%

## 2019-08-16 NOTE — ANESTHESIA PREPROCEDURE EVALUATION
Review of Systems/Medical History  Patient summary reviewed        Cardiovascular  EKG reviewed, Negative cardio ROS Exercise tolerance (METS): >4,  Hyperlipidemia, Past MI > 6 months, CAD , Angina ,   Comment: Angina in April 2019  Cath showed one area of blockage but not enough for intervention  Patient able to walk up 3 flights of stair to get to her apartment and she swims  ,  Pulmonary  Negative pulmonary ROS Smoker cigarette smoker  , Sleep apnea ,        GI/Hepatic  Negative GI/hepatic ROS   GERD ,        Negative  ROS        Endo/Other  Negative endo/other ROS      GYN  Negative gynecology ROS          Hematology  Negative hematology ROS      Musculoskeletal  Negative musculoskeletal ROS        Neurology  Negative neurology ROS      Psychology   Negative psychology ROS Anxiety,              Physical Exam    Airway    Mallampati score: I  TM Distance: >3 FB  Neck ROM: full     Dental   Comment: edentulous,     Cardiovascular  Comment: Negative ROS,     Pulmonary      Other Findings        Anesthesia Plan  ASA Score- 3     Anesthesia Type- IV sedation with anesthesia with ASA Monitors  Additional Monitors:   Airway Plan:     Comment: I have seen the patient and reviewed the history  Patient to receive IV sedation with full ASA monitors  Risks discussed with the patient, consent signed        Plan Factors-    Induction- intravenous  Postoperative Plan-     Informed Consent- Anesthetic plan and risks discussed with patient  I personally reviewed this patient with the CRNA  Discussed and agreed on the Anesthesia Plan with the CRNA  Melissa Hernandes

## 2019-08-23 ENCOUNTER — TELEPHONE (OUTPATIENT)
Dept: FAMILY MEDICINE CLINIC | Facility: CLINIC | Age: 54
End: 2019-08-23

## 2019-08-23 DIAGNOSIS — J01.00 ACUTE NON-RECURRENT MAXILLARY SINUSITIS: Primary | ICD-10-CM

## 2019-08-23 RX ORDER — AMOXICILLIN 500 MG/1
500 CAPSULE ORAL EVERY 8 HOURS SCHEDULED
Qty: 21 CAPSULE | Refills: 0 | Status: SHIPPED | OUTPATIENT
Start: 2019-08-23 | End: 2019-08-30

## 2019-10-17 DIAGNOSIS — F41.9 ANXIETY: ICD-10-CM

## 2019-10-29 DIAGNOSIS — F41.9 ANXIETY: ICD-10-CM

## 2019-10-30 RX ORDER — TRAZODONE HYDROCHLORIDE 50 MG/1
50 TABLET ORAL
Qty: 30 TABLET | Refills: 2 | Status: SHIPPED | OUTPATIENT
Start: 2019-10-30 | End: 2019-11-05

## 2019-11-04 RX ORDER — METHYLPREDNISOLONE 4 MG/1
TABLET ORAL
COMMUNITY
Start: 2019-08-19 | End: 2021-10-25 | Stop reason: ALTCHOICE

## 2019-11-05 ENCOUNTER — OFFICE VISIT (OUTPATIENT)
Dept: FAMILY MEDICINE CLINIC | Facility: CLINIC | Age: 54
End: 2019-11-05
Payer: COMMERCIAL

## 2019-11-05 VITALS
HEART RATE: 74 BPM | HEIGHT: 62 IN | WEIGHT: 203 LBS | SYSTOLIC BLOOD PRESSURE: 142 MMHG | OXYGEN SATURATION: 97 % | BODY MASS INDEX: 37.36 KG/M2 | RESPIRATION RATE: 18 BRPM | DIASTOLIC BLOOD PRESSURE: 84 MMHG

## 2019-11-05 DIAGNOSIS — I25.10 ASCVD (ARTERIOSCLEROTIC CARDIOVASCULAR DISEASE): ICD-10-CM

## 2019-11-05 DIAGNOSIS — M79.672 LEFT FOOT PAIN: ICD-10-CM

## 2019-11-05 DIAGNOSIS — E78.5 HYPERLIPIDEMIA, UNSPECIFIED HYPERLIPIDEMIA TYPE: ICD-10-CM

## 2019-11-05 DIAGNOSIS — F41.9 ANXIETY: ICD-10-CM

## 2019-11-05 DIAGNOSIS — I25.10 CORONARY ARTERY DISEASE INVOLVING NATIVE CORONARY ARTERY OF NATIVE HEART WITHOUT ANGINA PECTORIS: ICD-10-CM

## 2019-11-05 DIAGNOSIS — I25.119 CORONARY ARTERY DISEASE INVOLVING NATIVE CORONARY ARTERY OF NATIVE HEART WITH ANGINA PECTORIS (HCC): Primary | ICD-10-CM

## 2019-11-05 PROCEDURE — 3008F BODY MASS INDEX DOCD: CPT | Performed by: FAMILY MEDICINE

## 2019-11-05 PROCEDURE — 99214 OFFICE O/P EST MOD 30 MIN: CPT | Performed by: FAMILY MEDICINE

## 2019-11-05 RX ORDER — NITROGLYCERIN 0.4 MG/1
0.4 TABLET SUBLINGUAL
Qty: 100 TABLET | Refills: 5 | Status: SHIPPED | OUTPATIENT
Start: 2019-11-05 | End: 2020-08-13 | Stop reason: SDUPTHER

## 2019-11-05 RX ORDER — TRAZODONE HYDROCHLORIDE 100 MG/1
100 TABLET ORAL
Qty: 90 TABLET | Refills: 3 | Status: SHIPPED | OUTPATIENT
Start: 2019-11-05 | End: 2020-08-13 | Stop reason: SDUPTHER

## 2019-11-05 RX ORDER — ALPRAZOLAM 1 MG/1
1 TABLET ORAL 3 TIMES DAILY PRN
Qty: 270 TABLET | Refills: 3 | Status: SHIPPED | OUTPATIENT
Start: 2019-11-05 | End: 2020-03-04 | Stop reason: SDUPTHER

## 2019-11-05 RX ORDER — METOPROLOL TARTRATE 50 MG/1
50 TABLET, FILM COATED ORAL 2 TIMES DAILY
Qty: 180 TABLET | Refills: 3 | Status: SHIPPED | OUTPATIENT
Start: 2019-11-05 | End: 2020-03-04 | Stop reason: SDUPTHER

## 2019-11-05 RX ORDER — ATORVASTATIN CALCIUM 80 MG/1
80 TABLET, FILM COATED ORAL DAILY
Qty: 90 TABLET | Refills: 3 | Status: SHIPPED | OUTPATIENT
Start: 2019-11-05 | End: 2020-05-26 | Stop reason: SDUPTHER

## 2019-11-05 NOTE — PROGRESS NOTES
Assessment/Plan:    No problem-specific Assessment & Plan notes found for this encounter  Diagnoses and all orders for this visit:    Coronary artery disease involving native coronary artery of native heart with angina pectoris (HCC)    Anxiety  -     ALPRAZolam (XANAX) 1 mg tablet; Take 1 tablet (1 mg total) by mouth 3 (three) times a day as needed for anxiety  -     traZODone (DESYREL) 100 mg tablet; Take 1 tablet (100 mg total) by mouth daily at bedtime    Hyperlipidemia, unspecified hyperlipidemia type  -     atorvastatin (LIPITOR) 80 mg tablet; Take 1 tablet (80 mg total) by mouth daily    Coronary artery disease involving native coronary artery of native heart without angina pectoris  -     metoprolol tartrate (LOPRESSOR) 50 mg tablet; Take 1 tablet (50 mg total) by mouth 2 (two) times a day  -     nitroglycerin (NITROSTAT) 0 4 mg SL tablet; Place 1 tablet (0 4 mg total) under the tongue every 5 (five) minutes as needed for chest pain    ASCVD (arteriosclerotic cardiovascular disease)    Left foot pain    Other orders  -     methylPREDNISolone 4 MG tablet therapy pack; follow package directions          Subjective:      Patient ID: Haja Snowden is a 48 y o  female  She is scheduled for a sleep study  She has continued issues with her left foot and ankle  She re-injured her left ankle in July  She has not had her MRI yet  She had an episode of neck and chest discomfort that coincided with putting her cat down  She has not had recurrence  The following portions of the patient's history were reviewed and updated as appropriate:   She  has a past medical history of Angina pectoris ([address] ), Anxiety, GERD (gastroesophageal reflux disease), Myocardial infarction ([address] ), and Shortness of breath    She   Patient Active Problem List    Diagnosis Date Noted    Coronary artery disease involving native coronary artery of native heart with angina pectoris ([address] ) 04/25/2019    BMI 34 0-34 9,adult 03/29/2019    Abnormal CT of the abdomen 01/15/2019    Obstructive sleep apnea syndrome 11/12/2018    Palpitations 07/13/2018    Fatty food intolerance 06/25/2018    Left foot pain 06/25/2018    ASCVD (arteriosclerotic cardiovascular disease) 01/29/2016    Status post non-ST elevation myocardial infarction (NSTEMI) 01/29/2016    S/p bare metal coronary artery stent 01/26/2016    HLD (hyperlipidemia) 01/24/2016    NSTEMI (non-ST elevated myocardial infarction) ([facility] [address] ) 01/24/2016    Anxiety 02/12/2013    Sciatica 09/08/2008    Low back pain 04/14/2008     She  has a past surgical history that includes Toe Surgery; Tubal ligation; Coronary angioplasty with stent; Cardiac surgery; and Tubal ligation  Her family history includes Atrial fibrillation in her mother; Diabetes type II in her mother; Heart failure in her mother; Hypertension in her father and mother; Lung disease in her mother  She  reports that she has been smoking  She has never used smokeless tobacco  She reports that she does not drink alcohol or use drugs    Current Outpatient Medications   Medication Sig Dispense Refill    methylPREDNISolone 4 MG tablet therapy pack follow package directions      ALPRAZolam (XANAX) 1 mg tablet Take 1 tablet (1 mg total) by mouth 3 (three) times a day as needed for anxiety 270 tablet 3    amLODIPine (NORVASC) 5 mg tablet Take 1 tablet (5 mg total) by mouth daily 90 tablet 1    aspirin (ASPIRIN ADULT LOW DOSE) 81 mg EC tablet Adult Low Dose Aspirin      atorvastatin (LIPITOR) 80 mg tablet Take 1 tablet (80 mg total) by mouth daily 90 tablet 3    metoprolol tartrate (LOPRESSOR) 50 mg tablet Take 1 tablet (50 mg total) by mouth 2 (two) times a day 180 tablet 3    nitroglycerin (NITROSTAT) 0 4 mg SL tablet Place 1 tablet (0 4 mg total) under the tongue every 5 (five) minutes as needed for chest pain 100 tablet 5    omeprazole (PriLOSEC) 40 MG capsule Take 1 capsule (40 mg total) by mouth daily 90 capsule 3    PEG 3350-KCl-NaBcb-NaCl-NaSulf (GOLYTELY) 227 1 g PACK Take 227 1 g by mouth once for 1 dose 1 each 0    ranitidine (ZANTAC) 150 mg tablet Take 1 tablet (150 mg total) by mouth daily      traZODone (DESYREL) 100 mg tablet Take 1 tablet (100 mg total) by mouth daily at bedtime 90 tablet 3     No current facility-administered medications for this visit  Current Outpatient Medications on File Prior to Visit   Medication Sig    methylPREDNISolone 4 MG tablet therapy pack follow package directions    amLODIPine (NORVASC) 5 mg tablet Take 1 tablet (5 mg total) by mouth daily    aspirin (ASPIRIN ADULT LOW DOSE) 81 mg EC tablet Adult Low Dose Aspirin    omeprazole (PriLOSEC) 40 MG capsule Take 1 capsule (40 mg total) by mouth daily    PEG 3350-KCl-NaBcb-NaCl-NaSulf (GOLYTELY) 227 1 g PACK Take 227 1 g by mouth once for 1 dose    ranitidine (ZANTAC) 150 mg tablet Take 1 tablet (150 mg total) by mouth daily    [DISCONTINUED] ALPRAZolam (XANAX) 1 mg tablet Take 1 tablet (1 mg total) by mouth 3 (three) times a day as needed for anxiety    [DISCONTINUED] atorvastatin (LIPITOR) 80 mg tablet Take 1 tablet (80 mg total) by mouth daily    [DISCONTINUED] metoprolol tartrate (LOPRESSOR) 50 mg tablet Take 1 tablet (50 mg total) by mouth 2 (two) times a day    [DISCONTINUED] traZODone (DESYREL) 50 mg tablet Take 1 tablet (50 mg total) by mouth daily at bedtime     No current facility-administered medications on file prior to visit       Review of Systems   All other systems reviewed and are negative  Objective:      /84   Pulse 74   Resp 18   Ht 5' 2" (1 575 m)   Wt 92 1 kg (203 lb)   SpO2 97%   BMI 37 13 kg/m²          Physical Exam   Constitutional: She is oriented to person, place, and time  She appears well-developed and well-nourished  Neck: Normal range of motion  Neck supple  Cardiovascular: Normal rate, regular rhythm, normal heart sounds and intact distal pulses  Pulmonary/Chest: Effort normal and breath sounds normal    Abdominal: Soft  Bowel sounds are normal    Musculoskeletal: Normal range of motion  Neurological: She is alert and oriented to person, place, and time  Skin: Skin is warm and dry  Capillary refill takes less than 2 seconds  Psychiatric: She has a normal mood and affect  Her behavior is normal  Judgment and thought content normal    Nursing note and vitals reviewed

## 2019-11-29 DIAGNOSIS — Z12.31 ENCOUNTER FOR SCREENING MAMMOGRAM FOR BREAST CANCER: Primary | ICD-10-CM

## 2019-12-04 ENCOUNTER — TELEPHONE (OUTPATIENT)
Dept: FAMILY MEDICINE CLINIC | Facility: CLINIC | Age: 54
End: 2019-12-04

## 2019-12-11 ENCOUNTER — HOSPITAL ENCOUNTER (OUTPATIENT)
Dept: SLEEP CENTER | Facility: HOSPITAL | Age: 54
Discharge: HOME | End: 2019-12-11
Attending: INTERNAL MEDICINE
Payer: COMMERCIAL

## 2019-12-11 DIAGNOSIS — G47.33 OSA (OBSTRUCTIVE SLEEP APNEA): ICD-10-CM

## 2019-12-11 DIAGNOSIS — G47.34 SLEEP RELATED HYPOXIA: ICD-10-CM

## 2019-12-11 PROCEDURE — 95810 POLYSOM 6/> YRS 4/> PARAM: CPT

## 2019-12-12 NOTE — PROGRESS NOTES
Sleep Study Documentation    Pre-Sleep Study       Sleep testing procedure explained to patient:YES    Patient napped prior to study:NO    Caffeine:Dayshift worker after 12PM   Caffeine use:YES- coffee  6 to 18 ounces and soda  12 to 26 ounces    Alcohol:Dayshift workers after 5PM: Alcohol use:NO    Typical day for patient:YES       Study Documentation    Sleep Study Indications: snoring, unrefreshing sleep, nocturnal choking insomnia    Sleep Study: Diagnostic   Snore: Moderate  Supplemental O2: no    O2 flow rate (L/min) range n/a  O2 flow rate (L/min) final n/a  Minimum SaO2 84  Baseline SaO2 90        Mode of Therapy:    EKG abnormalities: no     EEG abnormalities: yes: bursts of alpha throughout study    Sleep Study Recorded < 2 hours: N/A    Sleep Study Recorded > 2 hours but incomplete study: N/A    Sleep Study Recorded 6 hours but no sleep obtained: NO    Patient classification: disabled       Post-Sleep Study    Medication used at bedtime or during sleep study:YES prescription sleep aid and other prescription medications    Patient reports time it took to fall asleep:30 to 60 minutes    Patient reports waking up during study:3 or more times  Patient reports returning to sleep in 10 to 30 minutes  Patient reports sleeping 6 to 8 hours with dreaming  Patient reports sleep during study:typical    Patient rated sleepiness: Not sleepy or tired    PAP treatment:no

## 2019-12-13 ENCOUNTER — TELEPHONE (OUTPATIENT)
Dept: SLEEP CENTER | Facility: CLINIC | Age: 54
End: 2019-12-13

## 2019-12-13 NOTE — TELEPHONE ENCOUNTER
Called the patient, left message for her to call back for sleep study results and that CPAP study is canceled      Gave place date and time of follow up appointment with Dr Marylene Many

## 2019-12-29 DIAGNOSIS — I10 ESSENTIAL HYPERTENSION: ICD-10-CM

## 2019-12-30 RX ORDER — AMLODIPINE BESYLATE 5 MG/1
TABLET ORAL
Qty: 90 TABLET | Refills: 5 | Status: SHIPPED | OUTPATIENT
Start: 2019-12-30 | End: 2020-03-04 | Stop reason: SDUPTHER

## 2020-02-06 ENCOUNTER — OFFICE VISIT (OUTPATIENT)
Dept: GASTROENTEROLOGY | Facility: CLINIC | Age: 55
End: 2020-02-06
Payer: COMMERCIAL

## 2020-02-06 ENCOUNTER — OFFICE VISIT (OUTPATIENT)
Dept: SLEEP CENTER | Facility: CLINIC | Age: 55
End: 2020-02-06
Payer: COMMERCIAL

## 2020-02-06 VITALS
SYSTOLIC BLOOD PRESSURE: 130 MMHG | WEIGHT: 200 LBS | HEIGHT: 62 IN | DIASTOLIC BLOOD PRESSURE: 70 MMHG | HEART RATE: 86 BPM | OXYGEN SATURATION: 97 % | BODY MASS INDEX: 36.8 KG/M2

## 2020-02-06 VITALS
HEIGHT: 62 IN | BODY MASS INDEX: 38.09 KG/M2 | WEIGHT: 207 LBS | DIASTOLIC BLOOD PRESSURE: 82 MMHG | SYSTOLIC BLOOD PRESSURE: 142 MMHG | HEART RATE: 80 BPM | TEMPERATURE: 97.4 F

## 2020-02-06 DIAGNOSIS — F41.9 ANXIETY: ICD-10-CM

## 2020-02-06 DIAGNOSIS — I25.119 CORONARY ARTERY DISEASE INVOLVING NATIVE CORONARY ARTERY OF NATIVE HEART WITH ANGINA PECTORIS (HCC): ICD-10-CM

## 2020-02-06 DIAGNOSIS — K21.9 GASTROESOPHAGEAL REFLUX DISEASE, ESOPHAGITIS PRESENCE NOT SPECIFIED: ICD-10-CM

## 2020-02-06 DIAGNOSIS — Z72.0 TOBACCO ABUSE: ICD-10-CM

## 2020-02-06 DIAGNOSIS — G47.34 SLEEP RELATED HYPOXIA: Primary | ICD-10-CM

## 2020-02-06 DIAGNOSIS — R14.0 BLOATING: ICD-10-CM

## 2020-02-06 DIAGNOSIS — R10.13 DYSPEPSIA: ICD-10-CM

## 2020-02-06 DIAGNOSIS — G47.09 OTHER INSOMNIA: ICD-10-CM

## 2020-02-06 DIAGNOSIS — G47.69 SLEEP RELATED RHYTHMIC MOVEMENT DISORDER: ICD-10-CM

## 2020-02-06 DIAGNOSIS — Z12.11 SCREENING FOR COLON CANCER: Primary | ICD-10-CM

## 2020-02-06 PROCEDURE — 3008F BODY MASS INDEX DOCD: CPT | Performed by: INTERNAL MEDICINE

## 2020-02-06 PROCEDURE — 3079F DIAST BP 80-89 MM HG: CPT | Performed by: PHYSICIAN ASSISTANT

## 2020-02-06 PROCEDURE — 3075F SYST BP GE 130 - 139MM HG: CPT | Performed by: INTERNAL MEDICINE

## 2020-02-06 PROCEDURE — 3008F BODY MASS INDEX DOCD: CPT | Performed by: PHYSICIAN ASSISTANT

## 2020-02-06 PROCEDURE — 99214 OFFICE O/P EST MOD 30 MIN: CPT | Performed by: INTERNAL MEDICINE

## 2020-02-06 PROCEDURE — 3077F SYST BP >= 140 MM HG: CPT | Performed by: PHYSICIAN ASSISTANT

## 2020-02-06 PROCEDURE — 99214 OFFICE O/P EST MOD 30 MIN: CPT | Performed by: PHYSICIAN ASSISTANT

## 2020-02-06 PROCEDURE — 3078F DIAST BP <80 MM HG: CPT | Performed by: INTERNAL MEDICINE

## 2020-02-06 RX ORDER — OMEPRAZOLE 40 MG/1
40 CAPSULE, DELAYED RELEASE ORAL DAILY
Qty: 90 CAPSULE | Refills: 3 | Status: SHIPPED | OUTPATIENT
Start: 2020-02-06 | End: 2020-03-04 | Stop reason: SDUPTHER

## 2020-02-06 NOTE — PATIENT INSTRUCTIONS

## 2020-02-06 NOTE — PROGRESS NOTES
Follow-up Note - Sleep Center   Mj Asher  48 y o  female  :1965  UIY:[id number]    I saw Fab Tai in sleep clinic today for her sleep complaints & comorbidities  Patient recently had a repeat diagnostic sleep study and is here to review results / treatment options  The study demonstrated no evidence for obstructive sleep apnea: AHI 0 /hour     Intermittent snoring of moderate intensity was noted    Minimum oxygen saturation 84 %  and 219 minutes (73 6%) of total sleep time was spent with saturations less than 90%  Sleep efficiency was 88 5%  She had no stage REM  EEG demonstrated alpha intrusion throughout the study night  A prior study demonstrated: No respiratory events could be scored on the study night because of episodic artifact in airflow channels  Snoring of loud intensity was noted    Minimum oxygen saturation 83 %  and 88 5% of total sleep time was spent with saturations less than 90%  Sleep efficiency was 70 6%  Sleep latency was prolonged and she had difficulty maintaining sleep  PFSH, Problem List, Medications & Allergies were reviewed in EMR  Interval changes: none reported  She  has a past medical history of Angina pectoris ([facility] ), Anxiety, GERD (gastroesophageal reflux disease), Myocardial infarction ([facility] [address] ), and Shortness of breath  She has a current medication list which includes the following prescription(s): alprazolam, amlodipine, aspirin, atorvastatin, methylprednisolone, metoprolol tartrate, nitroglycerin, omeprazole, and trazodone  ROS: reviewed see attached  She has musculoskeletal aches and pains but states it is not disturbing sleep  She feels acid reflux is controlled  HPI:  She feels she is sleeping better since initiating strategies discussed at her last visit  However, she is still tired and waking up at night    She continues to report stress and anxiety regarding 2 of her Children in the [employer] that is adding to her sleep difficulties  Sleep Routine:  She has an irregular schedule  She is spending around 6 hours in bed  She falls asleep a lot faster since not having TV on at night  She estimates she is getting 3-4 hours sleep  [She has excessive drowsiness and tries to avoid napping  She rated herself at Total score: 11 /24 on the [place] sleepiness scale ]        Habits:  reports that she has been smoking  She has never used smokeless tobacco  She reports that she does not drink alcohol or use drugs  She has limited her use of caffeine and tries to stop by around 5:00 p m  Genesis Fox She is working on smoking cessation  EXAM: /70 (BP Location: Left arm, Cuff Size: Large)   Pulse 86   Ht 5' 2" (1 575 m)   Wt 90 7 kg (200 lb)   SpO2 97%   BMI 36 58 kg/m²     Patient is well groomed; well appearing  Skin/Extrem: warm & dry; col & hydration normal; no edema  Psych: cooperativeand in no distress  Mental state appears anxious  CNS: Alert, orientated, clear & coherent speech  H&N: EOMI; NC/AT:no facial pressure marks, no rashes  Neck Circumference: 17 cm  ENMT Mucosa appearsnormal Nasal airway:patent  Oral airway:  crowded  Resp:effort is normal CVS: RRR ABD: truncal obesity MSK:Gait normal     IMPRESSION: Primary Sleep/Secondary(to Medical or Psych conditions) & comorbidities   1  Sleep related hypoxia  Pulse oximetry overnight   2  Other insomnia     3  Anxiety     4  Tobacco abuse     5  Sleep related rhythmic movement disorder     6  Coronary artery disease involving native coronary artery of native heart with angina pectoris ([facility] [address] )     7  BMI 34 0-34 9,adult       PLAN:    1  I reviewed results of the Sleep studies with the patient  2  With respect to above conditions, I counseled on pathophysiology, diagnosis, treatment options, risks and benefits; inter-relationship and effects on symptoms and comorbidities; risks of no treatment; costs and insurance aspects     3  Cognitive behavioral therapy was continued, Sleep Hygiene and behavioral techniques to manage Insomnia were discussed  4  She was encouraged to persist with efforts at weight reduction and smoking cessation that would address her snoring and sleep-related hypoxia  5  She may benefit from supplemental oxygen during sleep, but will need overnight pulse oximetry to justify  6  Continue trazodone  If anxiety persists, she may need counseling or reviewing of her medication  7  Follow-up to be scheduled in 3 months to monitor compliance, progress and to adjust therapy  Thank you for allowing me to participate in the care of this patient  I will keep you apprised of developments      Sincerely,    Authenticated electronically by Maria G Paredes MD on 76/86/49   Board Certified Specialist

## 2020-02-06 NOTE — PROGRESS NOTES
Review of Systems      Genitourinary need to urinate more than twice a night and hot flashes at night   Cardiology Frequent chest pain or angina, , palpitations/fluttering feeling in the chest and none   Gastrointestinal none   Neurology frequent headaches, awaken with headache, need to move extremities, numbness/tingling of an extremity and balance problems   Constitutional fatigue and excessive sweating at night   Integumentary none   Psychiatry anxiety   Musculoskeletal muscle aches, back pain and sciatica   Pulmonary shortness of breath with activity, chest tightness, wheezing and snoring   ENT none   Endocrine excessive thirst   Hematological none

## 2020-02-06 NOTE — PROGRESS NOTES
[facility] - Outpatient Follow-up Note  Shonna Nixon 48 y o  female MRN: [id number]  Encounter: [id number]          ASSESSMENT AND PLAN:    1  GERD  2  Dyspepsia  3  Bloating   -she recently had an upper endoscopy with LA class a esophagitis seen, stomach biopsies were negative for H pylori and duodenal biopsies were negative for celiac disease    -her previous symptoms of dyspepsia, bloating and reflux especially at night are much improved on omeprazole  Initially she was taking this daily with ranitidine at bedtime however since the ranitidine was on back order she started taking the omeprazole 40 mg twice daily  She does note that she has missed the evening dose a couple of times without any ill effect  -recommend decreasing omeprazole to 40 mg once daily, if she is able to tolerate this hopefully we can taper this further in the future  We also discussed other treatment options including procedures to correct reflux    -she does avoid her trigger foods generally, she stops eating at 5:00 p m  As well    -weight loss would likely help with her reflux symptoms   -follow-up in the office in a few months to see how she does with the lower dose    4  Screening for colon cancer   -she had a recent colonoscopy with several polyps removed, fortunately these were hyperplastic on pathology so repeat was recommended in 10 years  ____________________________________________________________    SUBJECTIVE:    45-year-old female past medical history of ALEX, MI and morbid obesity presents to the office for follow-up after recent colonoscopy and upper endoscopy  She reports that previously she was having bloating and dyspepsia after meals, she also had significant reflux of stomach acid with a burning sensation in her throat at night  Initially she was using milk to try to correct this, she was then put on Zantac and finally omeprazole 40 mg daily    She was doing well on this regimen but her pharmacy informed her that the Zantac was on back order so she started taking omeprazole 40 mg twice daily  She does note that she has missed the evening dose a couple of times without having any recurrence of her symptoms  She states the bloating and dyspepsia are entirely resolved  She does stop eating at 5:00 p m  To help mitigate the symptoms as well  She denies any other GI complaints and specifically denies any abdominal pain, nausea, vomiting, difficulty swallowing, hematochezia, melena, change in appetite, unintended weight loss, constipation, diarrhea, fevers, chills, rashes or jaundice  REVIEW OF SYSTEMS IS OTHERWISE NEGATIVE        Historical Information   Past Medical History:   Diagnosis Date    Angina pectoris (HCC)     Anxiety     GERD (gastroesophageal reflux disease)     Myocardial infarction ([facility] )     1/24/16    Shortness of breath      Past Surgical History:   Procedure Laterality Date    CARDIAC SURGERY      CORONARY ANGIOPLASTY WITH STENT PLACEMENT      TOE SURGERY      TUBAL LIGATION      TUBAL LIGATION       Social History   Social History     Substance and Sexual Activity   Alcohol Use No     Social History     Substance and Sexual Activity   Drug Use No     Social History     Tobacco Use   Smoking Status Current Every Day Smoker   Smokeless Tobacco Never Used   Tobacco Comment    4 cigs daily     Family History   Problem Relation Age of Onset    Atrial fibrillation Mother     Heart failure Mother     Diabetes type II Mother     Hypertension Mother     Lung disease Mother         Pleural Effusion    Hypertension Father        Meds/Allergies       Current Outpatient Medications:     ALPRAZolam (XANAX) 1 mg tablet    amLODIPine (NORVASC) 5 mg tablet    aspirin (ASPIRIN ADULT LOW DOSE) 81 mg EC tablet    atorvastatin (LIPITOR) 80 mg tablet    methylPREDNISolone 4 MG tablet therapy pack    metoprolol tartrate (LOPRESSOR) 50 mg tablet    nitroglycerin (NITROSTAT) 0 4 mg SL tablet    omeprazole (PriLOSEC) 40 MG capsule    traZODone (DESYREL) 100 mg tablet    Allergies   Allergen Reactions    Morphine Confusion and Delirium           Objective     Blood pressure 142/82, pulse 80, temperature (!) 97 4 °F (36 3 °C), temperature source Tympanic, height 5' 2" (1 575 m), weight 93 9 kg (207 lb)  PHYSICAL EXAM:      Physical Exam   Constitutional: She is oriented to person, place, and time  She appears well-developed  No distress  Morbidly obese   HENT:   Head: Normocephalic and atraumatic  Eyes: Right eye exhibits no discharge  Left eye exhibits no discharge  No scleral icterus  Neck: Neck supple  No tracheal deviation present  Cardiovascular: Normal rate, regular rhythm and normal heart sounds  Exam reveals no gallop and no friction rub  No murmur heard  Pulmonary/Chest: Effort normal  No respiratory distress  She has no wheezes  She has no rales  Abdominal: Soft  Bowel sounds are normal  She exhibits no distension  There is no tenderness  There is no rebound and no guarding  Neurological: She is alert and oriented to person, place, and time  Skin: Skin is warm and dry  Psychiatric: She has a normal mood and affect  Lab Results:   No visits with results within 1 Day(s) from this visit     Latest known visit with results is:   Hospital Outpatient Visit on 08/16/2019   Component Date Value    Case Report 08/16/2019                      Value:Surgical Pathology Report                         Case: [id number]                                   Authorizing Provider:  Addie Hernandez MD           Collected:           08/16/2019 0909              Ordering Location:     [facility] Received:            08/16/2019 [address] [place] [address]                                     Operating Room                                                               Pathologist:           Cruz Mcburney, MD                                                        Specimens: A) - Duodenum, biopsy retrieved by cold biopsy forceps, dx: weight loss, R/O celiac                 disease                                                                                             B) - Stomach, biopsy retrieved by cold biopsy forceps, R/O H  pylori                                C) - Large Intestine, Right/Ascending Colon, polyp retrieved by cold biopsy forceps                 D) - Large Intestine, Sigmoid Colon, polyps x 2 retrieved by cold biopsy forceps                                              E) - Rectum, polyp retrieved by cold biopsy forceps                                        Final Diagnosis 08/16/2019                      Value: This result contains rich text formatting which cannot be displayed here   Additional Information 08/16/2019                      Value: This result contains rich text formatting which cannot be displayed here  Carlota Coleman Gross Description 08/16/2019                      Value: This result contains rich text formatting which cannot be displayed here  Radiology Results:   No results found

## 2020-02-10 ENCOUNTER — OFFICE VISIT (OUTPATIENT)
Dept: FAMILY MEDICINE CLINIC | Facility: CLINIC | Age: 55
End: 2020-02-10
Payer: COMMERCIAL

## 2020-02-10 VITALS
OXYGEN SATURATION: 97 % | HEIGHT: 62 IN | BODY MASS INDEX: 38.46 KG/M2 | DIASTOLIC BLOOD PRESSURE: 88 MMHG | HEART RATE: 73 BPM | SYSTOLIC BLOOD PRESSURE: 142 MMHG | WEIGHT: 209 LBS

## 2020-02-10 DIAGNOSIS — G47.33 OSA (OBSTRUCTIVE SLEEP APNEA): Primary | ICD-10-CM

## 2020-02-10 DIAGNOSIS — E78.2 MIXED HYPERLIPIDEMIA: ICD-10-CM

## 2020-02-10 DIAGNOSIS — I21.4 NSTEMI (NON-ST ELEVATED MYOCARDIAL INFARCTION) (HCC): ICD-10-CM

## 2020-02-10 DIAGNOSIS — I25.119 CORONARY ARTERY DISEASE INVOLVING NATIVE CORONARY ARTERY OF NATIVE HEART WITH ANGINA PECTORIS (HCC): ICD-10-CM

## 2020-02-10 PROCEDURE — 3008F BODY MASS INDEX DOCD: CPT | Performed by: FAMILY MEDICINE

## 2020-02-10 PROCEDURE — 3079F DIAST BP 80-89 MM HG: CPT | Performed by: FAMILY MEDICINE

## 2020-02-10 PROCEDURE — 3077F SYST BP >= 140 MM HG: CPT | Performed by: FAMILY MEDICINE

## 2020-02-10 PROCEDURE — 99214 OFFICE O/P EST MOD 30 MIN: CPT | Performed by: FAMILY MEDICINE

## 2020-02-10 NOTE — PROGRESS NOTES
Assessment/Plan:    ALEX (obstructive sleep apnea)  Stable  Continue current  NSTEMI (non-ST elevated myocardial infarction) ([facility] [address] )  Stable  Continue current  Coronary artery disease involving native coronary artery of native heart with angina pectoris (HCC)  Stable  Continue current  HLD (hyperlipidemia)  Stable  Continue current  Diagnoses and all orders for this visit:    ALEX (obstructive sleep apnea)    NSTEMI (non-ST elevated myocardial infarction) [facility])    Coronary artery disease involving native coronary artery of native heart with angina pectoris ([facility] [address] )    Mixed hyperlipidemia          Subjective:      Patient ID: Annel Ramirez is a 48 y o  female  She has quite a bit of stress at home  She has no CP or SOB  She has no HA or vision changes  She has fatigue, SOB, and CORBETT  She finds it difficult to do moderate housework  She has no peripheral edema or weight changes  She does not have sleep apnea, but she does desat overnight  She is due for overnight pulse oxymetry  This complicates the above issues  She continues to have LBP and foot pain  She has no red flags for low back pain, however  She takes OTC pain medications  She has been on xanax since I met her back in 2008  I doubt we will make much progress in tapering her while she has so much going on at home  The following portions of the patient's history were reviewed and updated as appropriate: She is allergic to morphine       Review of Systems   All other systems reviewed and are negative  Objective:      /88   Pulse 73   Ht 5' 2" (1 575 m)   Wt 94 8 kg (209 lb)   SpO2 97%   BMI 38 23 kg/m²          Physical Exam   Constitutional: She is oriented to person, place, and time  She appears well-developed and well-nourished  Neck: Normal range of motion  Neck supple  Cardiovascular: Normal rate, regular rhythm, normal heart sounds and intact distal pulses     Pulmonary/Chest: Effort normal and breath sounds normal    Abdominal: Soft  Bowel sounds are normal    Musculoskeletal: Normal range of motion  Neurological: She is alert and oriented to person, place, and time  Skin: Skin is warm and dry  Capillary refill takes less than 2 seconds  Psychiatric: She has a normal mood and affect  Her behavior is normal  Judgment and thought content normal    Nursing note and vitals reviewed

## 2020-02-11 ENCOUNTER — TELEPHONE (OUTPATIENT)
Dept: SLEEP CENTER | Facility: CLINIC | Age: 55
End: 2020-02-11

## 2020-02-18 ENCOUNTER — TELEPHONE (OUTPATIENT)
Dept: FAMILY MEDICINE CLINIC | Facility: CLINIC | Age: 55
End: 2020-02-18

## 2020-02-20 ENCOUNTER — TELEPHONE (OUTPATIENT)
Dept: SLEEP CENTER | Facility: CLINIC | Age: 55
End: 2020-02-20

## 2020-02-20 ENCOUNTER — TELEPHONE (OUTPATIENT)
Dept: FAMILY MEDICINE CLINIC | Facility: CLINIC | Age: 55
End: 2020-02-20

## 2020-02-20 DIAGNOSIS — G47.34 SLEEP RELATED HYPOXIA: Primary | ICD-10-CM

## 2020-02-20 NOTE — TELEPHONE ENCOUNTER
Patient called back she doesn't want oxygen  She is going to talk to her PCP about it   Advised her that order went to her DME provider and she will have to refuse delivery if she feels she doesn't want the Oxygen

## 2020-02-20 NOTE — TELEPHONE ENCOUNTER
----- Message from Verenice Vasquez MD sent at 2/20/2020 11:42 AM EST -----  Nocturnal pulse oximetry justifies supplemental oxygen during sleep  She will need to return to follow up in 2 months to monitor progress  Oxygen desaturation index was 12 per hour    In time a repeat diagnostic sleep study would be warranted

## 2020-02-20 NOTE — TELEPHONE ENCOUNTER
Order for Oxygen and clinicals faxed to [facility] patient      Left message to notify patient

## 2020-03-04 DIAGNOSIS — K21.9 GASTROESOPHAGEAL REFLUX DISEASE, ESOPHAGITIS PRESENCE NOT SPECIFIED: ICD-10-CM

## 2020-03-04 DIAGNOSIS — I10 ESSENTIAL HYPERTENSION: ICD-10-CM

## 2020-03-04 DIAGNOSIS — R10.13 DYSPEPSIA: ICD-10-CM

## 2020-03-04 DIAGNOSIS — I25.10 CORONARY ARTERY DISEASE INVOLVING NATIVE CORONARY ARTERY OF NATIVE HEART WITHOUT ANGINA PECTORIS: ICD-10-CM

## 2020-03-04 DIAGNOSIS — F41.9 ANXIETY: ICD-10-CM

## 2020-03-04 RX ORDER — OMEPRAZOLE 40 MG/1
40 CAPSULE, DELAYED RELEASE ORAL DAILY
Qty: 90 CAPSULE | Refills: 0 | Status: SHIPPED | OUTPATIENT
Start: 2020-03-04 | End: 2020-03-04 | Stop reason: SDUPTHER

## 2020-03-04 RX ORDER — METOPROLOL TARTRATE 50 MG/1
50 TABLET, FILM COATED ORAL 2 TIMES DAILY
Qty: 180 TABLET | Refills: 0 | Status: SHIPPED | OUTPATIENT
Start: 2020-03-04 | End: 2020-05-26 | Stop reason: SDUPTHER

## 2020-03-04 RX ORDER — ALPRAZOLAM 1 MG/1
1 TABLET ORAL 3 TIMES DAILY PRN
Qty: 270 TABLET | Refills: 0 | Status: SHIPPED | OUTPATIENT
Start: 2020-03-04 | End: 2020-05-26 | Stop reason: SDUPTHER

## 2020-03-04 RX ORDER — AMLODIPINE BESYLATE 5 MG/1
5 TABLET ORAL DAILY
Qty: 90 TABLET | Refills: 0 | Status: SHIPPED | OUTPATIENT
Start: 2020-03-04 | End: 2020-05-26 | Stop reason: SDUPTHER

## 2020-03-04 RX ORDER — OMEPRAZOLE 40 MG/1
40 CAPSULE, DELAYED RELEASE ORAL DAILY
Qty: 90 CAPSULE | Refills: 3 | Status: SHIPPED | OUTPATIENT
Start: 2020-03-04 | End: 2020-05-20 | Stop reason: SDUPTHER

## 2020-03-04 NOTE — TELEPHONE ENCOUNTER
Pt does not want to leave the house due to the cold season  "SHE DOES NOT WANT THE CORONAVIRUS " She is requesting for 3 mo supply of her meds

## 2020-03-16 ENCOUNTER — TELEPHONE (OUTPATIENT)
Dept: FAMILY MEDICINE CLINIC | Facility: CLINIC | Age: 55
End: 2020-03-16

## 2020-03-16 NOTE — TELEPHONE ENCOUNTER
Sonja Bishop completed her sleep study and got her results  The Dr  wanted her to be on oxygen during the night  She does not want to do that  She wants to try and lose some weight on her own and retake her sleep study  [facility] Patient [phone] would like you to call and verify that she is allowed to do so

## 2020-04-28 ENCOUNTER — TELEMEDICINE (OUTPATIENT)
Dept: FAMILY MEDICINE CLINIC | Facility: CLINIC | Age: 55
End: 2020-04-28
Payer: COMMERCIAL

## 2020-04-28 DIAGNOSIS — G47.33 OSA (OBSTRUCTIVE SLEEP APNEA): Primary | ICD-10-CM

## 2020-04-28 DIAGNOSIS — S93.402A SPRAIN OF LEFT ANKLE, UNSPECIFIED LIGAMENT, INITIAL ENCOUNTER: ICD-10-CM

## 2020-04-28 DIAGNOSIS — I21.4 NSTEMI (NON-ST ELEVATED MYOCARDIAL INFARCTION) (HCC): ICD-10-CM

## 2020-04-28 DIAGNOSIS — F41.9 ANXIETY: ICD-10-CM

## 2020-04-28 PROCEDURE — 99442 PR PHYS/QHP TELEPHONE EVALUATION 11-20 MIN: CPT | Performed by: FAMILY MEDICINE

## 2020-05-20 ENCOUNTER — TELEPHONE (OUTPATIENT)
Dept: GASTROENTEROLOGY | Facility: CLINIC | Age: 55
End: 2020-05-20

## 2020-05-20 ENCOUNTER — TELEMEDICINE (OUTPATIENT)
Dept: GASTROENTEROLOGY | Facility: CLINIC | Age: 55
End: 2020-05-20
Payer: COMMERCIAL

## 2020-05-20 DIAGNOSIS — Z83.71 FAMILY HISTORY OF COLONIC POLYPS: ICD-10-CM

## 2020-05-20 DIAGNOSIS — R10.13 DYSPEPSIA: Primary | ICD-10-CM

## 2020-05-20 DIAGNOSIS — R14.0 BLOATING: ICD-10-CM

## 2020-05-20 DIAGNOSIS — K21.9 GASTROESOPHAGEAL REFLUX DISEASE, ESOPHAGITIS PRESENCE NOT SPECIFIED: ICD-10-CM

## 2020-05-20 PROBLEM — Z83.719 FAMILY HISTORY OF COLONIC POLYPS: Status: ACTIVE | Noted: 2020-05-20

## 2020-05-20 PROCEDURE — 99214 OFFICE O/P EST MOD 30 MIN: CPT | Performed by: INTERNAL MEDICINE

## 2020-05-20 RX ORDER — OMEPRAZOLE 40 MG/1
40 CAPSULE, DELAYED RELEASE ORAL DAILY
Qty: 90 CAPSULE | Refills: 3 | Status: SHIPPED | OUTPATIENT
Start: 2020-05-20 | End: 2020-08-13 | Stop reason: SDUPTHER

## 2020-05-21 ENCOUNTER — TELEPHONE (OUTPATIENT)
Dept: GASTROENTEROLOGY | Facility: CLINIC | Age: 55
End: 2020-05-21

## 2020-05-26 ENCOUNTER — TELEMEDICINE (OUTPATIENT)
Dept: FAMILY MEDICINE CLINIC | Facility: CLINIC | Age: 55
End: 2020-05-26
Payer: COMMERCIAL

## 2020-05-26 DIAGNOSIS — F41.9 ANXIETY: ICD-10-CM

## 2020-05-26 DIAGNOSIS — E78.5 HYPERLIPIDEMIA, UNSPECIFIED HYPERLIPIDEMIA TYPE: ICD-10-CM

## 2020-05-26 DIAGNOSIS — I10 ESSENTIAL HYPERTENSION: ICD-10-CM

## 2020-05-26 DIAGNOSIS — G47.33 OSA (OBSTRUCTIVE SLEEP APNEA): ICD-10-CM

## 2020-05-26 DIAGNOSIS — I25.10 CORONARY ARTERY DISEASE INVOLVING NATIVE CORONARY ARTERY OF NATIVE HEART WITHOUT ANGINA PECTORIS: ICD-10-CM

## 2020-05-26 DIAGNOSIS — I21.4 NSTEMI (NON-ST ELEVATED MYOCARDIAL INFARCTION) (HCC): Primary | ICD-10-CM

## 2020-05-26 PROCEDURE — 99214 OFFICE O/P EST MOD 30 MIN: CPT | Performed by: FAMILY MEDICINE

## 2020-05-26 RX ORDER — ATORVASTATIN CALCIUM 80 MG/1
80 TABLET, FILM COATED ORAL DAILY
Qty: 90 TABLET | Refills: 3 | Status: SHIPPED | OUTPATIENT
Start: 2020-05-26 | End: 2020-08-13 | Stop reason: SDUPTHER

## 2020-05-26 RX ORDER — AMLODIPINE BESYLATE 5 MG/1
5 TABLET ORAL DAILY
Qty: 90 TABLET | Refills: 3 | Status: SHIPPED | OUTPATIENT
Start: 2020-05-26 | End: 2020-08-13 | Stop reason: SDUPTHER

## 2020-05-26 RX ORDER — ALPRAZOLAM 1 MG/1
1 TABLET ORAL 3 TIMES DAILY PRN
Qty: 270 TABLET | Refills: 3 | Status: SHIPPED | OUTPATIENT
Start: 2020-05-26 | End: 2020-08-13 | Stop reason: SDUPTHER

## 2020-05-26 RX ORDER — METOPROLOL TARTRATE 50 MG/1
50 TABLET, FILM COATED ORAL 2 TIMES DAILY
Qty: 180 TABLET | Refills: 3 | Status: SHIPPED | OUTPATIENT
Start: 2020-05-26 | End: 2020-08-13 | Stop reason: SDUPTHER

## 2020-06-03 ENCOUNTER — TELEPHONE (OUTPATIENT)
Dept: FAMILY MEDICINE CLINIC | Facility: CLINIC | Age: 55
End: 2020-06-03

## 2020-06-17 ENCOUNTER — TELEPHONE (OUTPATIENT)
Dept: SLEEP CENTER | Facility: CLINIC | Age: 55
End: 2020-06-17

## 2020-06-29 ENCOUNTER — TELEPHONE (OUTPATIENT)
Dept: FAMILY MEDICINE CLINIC | Facility: CLINIC | Age: 55
End: 2020-06-29

## 2020-06-29 DIAGNOSIS — J01.00 ACUTE NON-RECURRENT MAXILLARY SINUSITIS: Primary | ICD-10-CM

## 2020-06-29 RX ORDER — AMOXICILLIN 500 MG/1
500 CAPSULE ORAL EVERY 8 HOURS SCHEDULED
Qty: 21 CAPSULE | Refills: 0 | Status: SHIPPED | OUTPATIENT
Start: 2020-06-29 | End: 2020-07-06

## 2020-08-13 ENCOUNTER — TELEPHONE (OUTPATIENT)
Dept: FAMILY MEDICINE CLINIC | Facility: CLINIC | Age: 55
End: 2020-08-13

## 2020-08-13 DIAGNOSIS — I25.10 CORONARY ARTERY DISEASE INVOLVING NATIVE CORONARY ARTERY OF NATIVE HEART WITHOUT ANGINA PECTORIS: ICD-10-CM

## 2020-08-13 DIAGNOSIS — F41.9 ANXIETY: ICD-10-CM

## 2020-08-13 DIAGNOSIS — K21.9 GASTROESOPHAGEAL REFLUX DISEASE, ESOPHAGITIS PRESENCE NOT SPECIFIED: ICD-10-CM

## 2020-08-13 DIAGNOSIS — I10 ESSENTIAL HYPERTENSION: ICD-10-CM

## 2020-08-13 DIAGNOSIS — R10.13 DYSPEPSIA: ICD-10-CM

## 2020-08-13 DIAGNOSIS — E78.5 HYPERLIPIDEMIA, UNSPECIFIED HYPERLIPIDEMIA TYPE: ICD-10-CM

## 2020-08-13 RX ORDER — AMLODIPINE BESYLATE 5 MG/1
5 TABLET ORAL DAILY
Qty: 90 TABLET | Refills: 3 | Status: SHIPPED | OUTPATIENT
Start: 2020-08-13 | End: 2020-08-13 | Stop reason: SDUPTHER

## 2020-08-13 RX ORDER — ALPRAZOLAM 1 MG/1
1 TABLET ORAL 3 TIMES DAILY PRN
Qty: 270 TABLET | Refills: 3 | Status: SHIPPED | OUTPATIENT
Start: 2020-08-13 | End: 2021-02-24

## 2020-08-13 RX ORDER — ASPIRIN 81 MG/1
81 TABLET ORAL DAILY
Qty: 90 TABLET | Refills: 3 | Status: SHIPPED | OUTPATIENT
Start: 2020-08-13

## 2020-08-13 RX ORDER — TRAZODONE HYDROCHLORIDE 100 MG/1
100 TABLET ORAL
Qty: 90 TABLET | Refills: 3 | Status: SHIPPED | OUTPATIENT
Start: 2020-08-13 | End: 2021-06-17

## 2020-08-13 RX ORDER — AMLODIPINE BESYLATE 5 MG/1
5 TABLET ORAL DAILY
Qty: 90 TABLET | Refills: 3 | Status: SHIPPED | OUTPATIENT
Start: 2020-08-13 | End: 2021-06-17

## 2020-08-13 RX ORDER — METOPROLOL TARTRATE 50 MG/1
50 TABLET, FILM COATED ORAL 2 TIMES DAILY
Qty: 180 TABLET | Refills: 3 | Status: SHIPPED | OUTPATIENT
Start: 2020-08-13 | End: 2021-06-17

## 2020-08-13 RX ORDER — ATORVASTATIN CALCIUM 80 MG/1
80 TABLET, FILM COATED ORAL DAILY
Qty: 90 TABLET | Refills: 3 | Status: SHIPPED | OUTPATIENT
Start: 2020-08-13 | End: 2021-06-17

## 2020-08-13 RX ORDER — NITROGLYCERIN 0.4 MG/1
0.4 TABLET SUBLINGUAL
Qty: 100 TABLET | Refills: 5 | Status: SHIPPED | OUTPATIENT
Start: 2020-08-13 | End: 2021-10-17

## 2020-08-13 RX ORDER — OMEPRAZOLE 40 MG/1
40 CAPSULE, DELAYED RELEASE ORAL DAILY
Qty: 90 CAPSULE | Refills: 3 | Status: SHIPPED | OUTPATIENT
Start: 2020-08-13 | End: 2021-06-25

## 2020-08-13 NOTE — TELEPHONE ENCOUNTER
Yes  Per patient they need to be received by next week in order to get medications mailed out on time

## 2020-08-13 NOTE — TELEPHONE ENCOUNTER
Patient needs all scripts to be sent to [facility] to get established for service beginning 9/1/2020

## 2020-12-03 ENCOUNTER — TELEMEDICINE (OUTPATIENT)
Dept: FAMILY MEDICINE CLINIC | Facility: CLINIC | Age: 55
End: 2020-12-03
Payer: COMMERCIAL

## 2020-12-03 DIAGNOSIS — I25.2 STATUS POST NON-ST ELEVATION MYOCARDIAL INFARCTION (NSTEMI): ICD-10-CM

## 2020-12-03 DIAGNOSIS — I25.10 ASCVD (ARTERIOSCLEROTIC CARDIOVASCULAR DISEASE): Primary | ICD-10-CM

## 2020-12-03 DIAGNOSIS — I25.119 CORONARY ARTERY DISEASE INVOLVING NATIVE CORONARY ARTERY OF NATIVE HEART WITH ANGINA PECTORIS (HCC): ICD-10-CM

## 2020-12-03 DIAGNOSIS — Z95.5 S/P BARE METAL CORONARY ARTERY STENT: ICD-10-CM

## 2020-12-03 DIAGNOSIS — E78.2 MIXED HYPERLIPIDEMIA: ICD-10-CM

## 2020-12-03 DIAGNOSIS — I21.4 NSTEMI (NON-ST ELEVATED MYOCARDIAL INFARCTION) (HCC): ICD-10-CM

## 2020-12-03 PROBLEM — G47.33 OSA (OBSTRUCTIVE SLEEP APNEA): Status: RESOLVED | Noted: 2018-11-12 | Resolved: 2020-12-03

## 2020-12-03 PROCEDURE — 99214 OFFICE O/P EST MOD 30 MIN: CPT | Performed by: FAMILY MEDICINE

## 2021-02-24 DIAGNOSIS — F41.9 ANXIETY: ICD-10-CM

## 2021-02-24 RX ORDER — ALPRAZOLAM 1 MG/1
TABLET ORAL
Qty: 270 TABLET | Refills: 3 | Status: SHIPPED | OUTPATIENT
Start: 2021-02-24 | End: 2021-08-13

## 2021-03-15 DIAGNOSIS — Z12.31 BREAST CANCER SCREENING BY MAMMOGRAM: Primary | ICD-10-CM

## 2021-06-17 ENCOUNTER — OFFICE VISIT (OUTPATIENT)
Dept: FAMILY MEDICINE CLINIC | Facility: CLINIC | Age: 56
End: 2021-06-17
Payer: COMMERCIAL

## 2021-06-17 VITALS
DIASTOLIC BLOOD PRESSURE: 76 MMHG | HEART RATE: 71 BPM | SYSTOLIC BLOOD PRESSURE: 116 MMHG | OXYGEN SATURATION: 97 % | HEIGHT: 62 IN | BODY MASS INDEX: 29.08 KG/M2 | WEIGHT: 158 LBS | TEMPERATURE: 97.7 F

## 2021-06-17 DIAGNOSIS — I10 ESSENTIAL HYPERTENSION: ICD-10-CM

## 2021-06-17 DIAGNOSIS — J42 CHRONIC BRONCHITIS, UNSPECIFIED CHRONIC BRONCHITIS TYPE (HCC): ICD-10-CM

## 2021-06-17 DIAGNOSIS — I25.10 ASCVD (ARTERIOSCLEROTIC CARDIOVASCULAR DISEASE): ICD-10-CM

## 2021-06-17 DIAGNOSIS — Z12.2 ENCOUNTER FOR SCREENING FOR LUNG CANCER: ICD-10-CM

## 2021-06-17 DIAGNOSIS — Z12.31 BREAST CANCER SCREENING BY MAMMOGRAM: ICD-10-CM

## 2021-06-17 DIAGNOSIS — I25.10 CORONARY ARTERY DISEASE INVOLVING NATIVE CORONARY ARTERY OF NATIVE HEART WITHOUT ANGINA PECTORIS: ICD-10-CM

## 2021-06-17 DIAGNOSIS — Z12.4 SCREENING FOR CERVICAL CANCER: Primary | ICD-10-CM

## 2021-06-17 DIAGNOSIS — F41.9 ANXIETY: ICD-10-CM

## 2021-06-17 DIAGNOSIS — K21.9 GASTROESOPHAGEAL REFLUX DISEASE, UNSPECIFIED WHETHER ESOPHAGITIS PRESENT: ICD-10-CM

## 2021-06-17 DIAGNOSIS — I25.119 CORONARY ARTERY DISEASE INVOLVING NATIVE CORONARY ARTERY OF NATIVE HEART WITH ANGINA PECTORIS (HCC): ICD-10-CM

## 2021-06-17 DIAGNOSIS — Z11.59 ENCOUNTER FOR HEPATITIS C SCREENING TEST FOR LOW RISK PATIENT: ICD-10-CM

## 2021-06-17 DIAGNOSIS — E78.5 HYPERLIPIDEMIA, UNSPECIFIED HYPERLIPIDEMIA TYPE: ICD-10-CM

## 2021-06-17 DIAGNOSIS — Z95.5 S/P BARE METAL CORONARY ARTERY STENT: ICD-10-CM

## 2021-06-17 PROBLEM — S93.324A DISLOCATION OF TARSOMETATARSAL JOINT OF RIGHT FOOT: Status: ACTIVE | Noted: 2021-06-17

## 2021-06-17 PROCEDURE — 99214 OFFICE O/P EST MOD 30 MIN: CPT | Performed by: FAMILY MEDICINE

## 2021-06-17 PROCEDURE — 3008F BODY MASS INDEX DOCD: CPT | Performed by: FAMILY MEDICINE

## 2021-06-17 PROCEDURE — G0439 PPPS, SUBSEQ VISIT: HCPCS | Performed by: FAMILY MEDICINE

## 2021-06-17 PROCEDURE — 3725F SCREEN DEPRESSION PERFORMED: CPT | Performed by: FAMILY MEDICINE

## 2021-06-17 RX ORDER — ATORVASTATIN CALCIUM 80 MG/1
TABLET, FILM COATED ORAL
Qty: 90 TABLET | Refills: 3 | Status: SHIPPED | OUTPATIENT
Start: 2021-06-17 | End: 2022-06-02 | Stop reason: SDUPTHER

## 2021-06-17 RX ORDER — TRAZODONE HYDROCHLORIDE 100 MG/1
TABLET ORAL
Qty: 90 TABLET | Refills: 3 | Status: SHIPPED | OUTPATIENT
Start: 2021-06-17 | End: 2021-06-17

## 2021-06-17 RX ORDER — MULTIVITAMIN
1 TABLET ORAL DAILY
COMMUNITY

## 2021-06-17 RX ORDER — AMLODIPINE BESYLATE 5 MG/1
TABLET ORAL
Qty: 90 TABLET | Refills: 3 | Status: SHIPPED | OUTPATIENT
Start: 2021-06-17 | End: 2022-06-02 | Stop reason: SDUPTHER

## 2021-06-17 RX ORDER — MULTIVIT WITH MINERALS/LUTEIN
1500 TABLET ORAL DAILY
COMMUNITY

## 2021-06-17 RX ORDER — NICOTINE POLACRILEX 2 MG
GUM BUCCAL
COMMUNITY

## 2021-06-17 RX ORDER — FOLIC ACID 0.8 MG
TABLET ORAL
COMMUNITY

## 2021-06-17 RX ORDER — CHLORAL HYDRATE 500 MG
1000 CAPSULE ORAL DAILY
COMMUNITY

## 2021-06-17 RX ORDER — METOPROLOL TARTRATE 50 MG/1
TABLET, FILM COATED ORAL
Qty: 180 TABLET | Refills: 3 | Status: SHIPPED | OUTPATIENT
Start: 2021-06-17 | End: 2022-06-02 | Stop reason: SDUPTHER

## 2021-06-17 NOTE — PATIENT INSTRUCTIONS
Medicare Preventive Visit Patient Instructions  Thank you for completing your Welcome to Medicare Visit or Medicare Annual Wellness Visit today  Your next wellness visit will be due in one year (6/18/2022)  The screening/preventive services that you may require over the next 5-10 years are detailed below  Some tests may not apply to you based off risk factors and/or age  Screening tests ordered at today's visit but not completed yet may show as past due  Also, please note that scanned in results may not display below  Preventive Screenings:  Service Recommendations Previous Testing/Comments   Colorectal Cancer Screening  * Colonoscopy    * Fecal Occult Blood Test (FOBT)/Fecal Immunochemical Test (FIT)  * Fecal DNA/Cologuard Test  * Flexible Sigmoidoscopy Age: 54-65 years old   Colonoscopy: every 10 years (may be performed more frequently if at higher risk)  OR  FOBT/FIT: every 1 year  OR  Cologuard: every 3 years  OR  Sigmoidoscopy: every 5 years  Screening may be recommended earlier than age 48 if at higher risk for colorectal cancer  Also, an individualized decision between you and your healthcare provider will decide whether screening between the ages of 74-80 would be appropriate  Colonoscopy: 08/16/2019  FOBT/FIT: Not on file  Cologuard: Not on file  Sigmoidoscopy: Not on file    Screening Current     Breast Cancer Screening Age: 36 years old  Frequency: every 1-2 years  Not required if history of left and right mastectomy Mammogram: Not on file        Cervical Cancer Screening Between the ages of 21-29, pap smear recommended once every 3 years  Between the ages of 33-67, can perform pap smear with HPV co-testing every 5 years     Recommendations may differ for women with a history of total hysterectomy, cervical cancer, or abnormal pap smears in past  Pap Smear: Not on file        Hepatitis C Screening Once for adults born between 1945 and 1965  More frequently in patients at high risk for Hepatitis C Hep C Antibody: Not on file        Diabetes Screening 1-2 times per year if you're at risk for diabetes or have pre-diabetes Fasting glucose: No results in last 5 years   A1C: No results in last 5 years        Cholesterol Screening Once every 5 years if you don't have a lipid disorder  May order more often based on risk factors  Lipid panel: 04/25/2019    Screening Not Indicated  History Lipid Disorder     Other Preventive Screenings Covered by Medicare:  1  Abdominal Aortic Aneurysm (AAA) Screening: covered once if your at risk  You're considered to be at risk if you have a family history of AAA  2  Lung Cancer Screening: covers low dose CT scan once per year if you meet all of the following conditions: (1) Age 50-69; (2) No signs or symptoms of lung cancer; (3) Current smoker or have quit smoking within the last 15 years; (4) You have a tobacco smoking history of at least 30 pack years (packs per day multiplied by number of years you smoked); (5) You get a written order from a healthcare provider  3  Glaucoma Screening: covered annually if you're considered high risk: (1) You have diabetes OR (2) Family history of glaucoma OR (3)  aged 48 and older OR (3)  American aged 72 and older  3  Osteoporosis Screening: covered every 2 years if you meet one of the following conditions: (1) You're estrogen deficient and at risk for osteoporosis based off medical history and other findings; (2) Have a vertebral abnormality; (3) On glucocorticoid therapy for more than 3 months; (4) Have primary hyperparathyroidism; (5) On osteoporosis medications and need to assess response to drug therapy  · Last bone density test (DXA Scan): Not on file  5  HIV Screening: covered annually if you're between the age of 12-76  Also covered annually if you are younger than 13 and older than 72 with risk factors for HIV infection   For pregnant patients, it is covered up to 3 times per pregnancy  Immunizations:  Immunization Recommendations   Influenza Vaccine Annual influenza vaccination during flu season is recommended for all persons aged >= 6 months who do not have contraindications   Pneumococcal Vaccine (Prevnar and Pneumovax)  * Prevnar = PCV13  * Pneumovax = PPSV23   Adults 25-60 years old: 1-3 doses may be recommended based on certain risk factors  Adults 72 years old: Prevnar (PCV13) vaccine recommended followed by Pneumovax (PPSV23) vaccine  If already received PPSV23 since turning 65, then PCV13 recommended at least one year after PPSV23 dose  Hepatitis B Vaccine 3 dose series if at intermediate or high risk (ex: diabetes, end stage renal disease, liver disease)   Tetanus (Td) Vaccine - COST NOT COVERED BY MEDICARE PART B Following completion of primary series, a booster dose should be given every 10 years to maintain immunity against tetanus  Td may also be given as tetanus wound prophylaxis  Tdap Vaccine - COST NOT COVERED BY MEDICARE PART B Recommended at least once for all adults  For pregnant patients, recommended with each pregnancy  Shingles Vaccine (Shingrix) - COST NOT COVERED BY MEDICARE PART B  2 shot series recommended in those aged 48 and above     Health Maintenance Due:      Topic Date Due    Hepatitis C Screening  Never done    MAMMOGRAM  Never done    HIV Screening  Never done    Cervical Cancer Screening  Never done    Colorectal Cancer Screening  08/16/2029     Immunizations Due:      Topic Date Due    DTaP,Tdap,and Td Vaccines (1 - Tdap) Never done     Advance Directives   What are advance directives? Advance directives are legal documents that state your wishes and plans for medical care  These plans are made ahead of time in case you lose your ability to make decisions for yourself  Advance directives can apply to any medical decision, such as the treatments you want, and if you want to donate organs  What are the types of advance directives? There are many types of advance directives, and each state has rules about how to use them  You may choose a combination of any of the following:  · Living will: This is a written record of the treatment you want  You can also choose which treatments you do not want, which to limit, and which to stop at a certain time  This includes surgery, medicine, IV fluid, and tube feedings  · Durable power of  for healthcare [facility]): This is a written record that states who you want to make healthcare choices for you when you are unable to make them for yourself  This person, called a proxy, is usually a family member or a friend  You may choose more than 1 proxy  · Do not resuscitate (DNR) order:  A DNR order is used in case your heart stops beating or you stop breathing  It is a request not to have certain forms of treatment, such as CPR  A DNR order may be included in other types of advance directives  · Medical directive: This covers the care that you want if you are in a coma, near death, or unable to make decisions for yourself  You can list the treatments you want for each condition  Treatment may include pain medicine, surgery, blood transfusions, dialysis, IV or tube feedings, and a ventilator (breathing machine)  · Values history: This document has questions about your views, beliefs, and how you feel and think about life  This information can help others choose the care that you would choose  Why are advance directives important? An advance directive helps you control your care  Although spoken wishes may be used, it is better to have your wishes written down  Spoken wishes can be misunderstood, or not followed  Treatments may be given even if you do not want them  An advance directive may make it easier for your family to make difficult choices about your care     Cigarette Smoking and Your Health   Risks to your health if you smoke:  Nicotine and other chemicals found in tobacco damage every cell in your body  Even if you are a light smoker, you have an increased risk for cancer, heart disease, and lung disease  If you are pregnant or have diabetes, smoking increases your risk for complications  Benefits to your health if you stop smoking:   · You decrease respiratory symptoms such as coughing, wheezing, and shortness of breath  · You reduce your risk for cancers of the lung, mouth, throat, kidney, bladder, pancreas, stomach, and cervix  If you already have cancer, you increase the benefits of chemotherapy  You also reduce your risk for cancer returning or a second cancer from developing  · You reduce your risk for heart disease, blood clots, heart attack, and stroke  · You reduce your risk for lung infections, and diseases such as pneumonia, asthma, chronic bronchitis, and emphysema  · Your circulation improves  More oxygen can be delivered to your body  If you have diabetes, you lower your risk for complications, such as kidney, artery, and eye diseases  You also lower your risk for nerve damage  Nerve damage can lead to amputations, poor vision, and blindness  · You improve your body's ability to heal and to fight infections  For more information and support to stop smoking:   · [employer]  Phone: 4- 504 - 501-3283  Web Address: www [employer]  Weight Management   Why it is important to manage your weight:  Being overweight increases your risk of health conditions such as heart disease, high blood pressure, type 2 diabetes, and certain types of cancer  It can also increase your risk for osteoarthritis, sleep apnea, and other respiratory problems  Aim for a slow, steady weight loss  Even a small amount of weight loss can lower your risk of health problems  How to lose weight safely:  A safe and healthy way to lose weight is to eat fewer calories and get regular exercise  You can lose up about 1 pound a week by decreasing the number of calories you eat by 500 calories each day     Healthy meal plan for weight management:  A healthy meal plan includes a variety of foods, contains fewer calories, and helps you stay healthy  A healthy meal plan includes the following:  · Eat whole-grain foods more often  A healthy meal plan should contain fiber  Fiber is the part of grains, fruits, and vegetables that is not broken down by your body  Whole-grain foods are healthy and provide extra fiber in your diet  Some examples of whole-grain foods are whole-wheat breads and pastas, oatmeal, brown rice, and bulgur  · Eat a variety of vegetables every day  Include dark, leafy greens such as spinach, kale, alissa greens, and mustard greens  Eat yellow and orange vegetables such as carrots, sweet potatoes, and winter squash  · Eat a variety of fruits every day  Choose fresh or canned fruit (canned in its own juice or light syrup) instead of juice  Fruit juice has very little or no fiber  · Eat low-fat dairy foods  Drink fat-free (skim) milk or 1% milk  Eat fat-free yogurt and low-fat cottage cheese  Try low-fat cheeses such as mozzarella and other reduced-fat cheeses  · Choose meat and other protein foods that are low in fat  Choose beans or other legumes such as split peas or lentils  Choose fish, skinless poultry (chicken or turkey), or lean cuts of red meat (beef or pork)  Before you cook meat or poultry, cut off any visible fat  · Use less fat and oil  Try baking foods instead of frying them  Add less fat, such as margarine, sour cream, regular salad dressing and mayonnaise to foods  Eat fewer high-fat foods  Some examples of high-fat foods include french fries, doughnuts, ice cream, and cakes  · Eat fewer sweets  Limit foods and drinks that are high in sugar  This includes candy, cookies, regular soda, and sweetened drinks  Exercise:  Exercise at least 30 minutes per day on most days of the week  Some examples of exercise include walking, biking, dancing, and swimming   You can also fit in more physical activity by taking the stairs instead of the elevator or parking farther away from stores  Ask your healthcare provider about the best exercise plan for you  © Copyright [employer] 2018 Information is for End User's use only and may not be sold, redistributed or otherwise used for commercial purposes   All illustrations and images included in CareNotes® are the copyrighted property of A D A M , Inc  or [address] [place], [place] [address]pe

## 2021-06-17 NOTE — PROGRESS NOTES
Assessment/Plan:    No problem-specific Assessment & Plan notes found for this encounter  Diagnoses and all orders for this visit:    Screening for cervical cancer  -     CBC and differential; Future  -     Lipid panel; Future  -     Comprehensive metabolic panel; Future  -     TSH, 3rd generation; Future    Breast cancer screening by mammogram  -     Mammo screening bilateral w cad; Future  -     CBC and differential; Future  -     Lipid panel; Future  -     Comprehensive metabolic panel; Future  -     TSH, 3rd generation; Future    Encounter for screening for lung cancer  -     CT lung screening program; Future  -     CBC and differential; Future  -     Lipid panel; Future  -     Comprehensive metabolic panel; Future  -     TSH, 3rd generation; Future    Gastroesophageal reflux disease, unspecified whether esophagitis present  -     CBC and differential; Future  -     Lipid panel; Future  -     Comprehensive metabolic panel; Future  -     TSH, 3rd generation; Future    ASCVD (arteriosclerotic cardiovascular disease)  -     CBC and differential; Future  -     Lipid panel; Future  -     Comprehensive metabolic panel; Future  -     TSH, 3rd generation; Future    Coronary artery disease involving native coronary artery of native heart with angina pectoris (HCC)  -     CBC and differential; Future  -     Lipid panel; Future  -     Comprehensive metabolic panel; Future  -     TSH, 3rd generation; Future    S/p bare metal coronary artery stent  -     CBC and differential; Future  -     Lipid panel; Future  -     Comprehensive metabolic panel; Future  -     TSH, 3rd generation; Future    Chronic bronchitis, unspecified chronic [facility]itis type ([facility] )    Other orders  -     Diclofenac Sodium (VOLTAREN) 1 %; diclofenac 1 % topical gel  -     Cancel: Ambulatory referral to Obstetrics / Gynecology; Future  -     ascorbic acid (VITAMIN C) 250 mg tablet;  Take 500 mg by mouth 2 (two) times a day  -     Omega-3 Fatty Acids (fish oil) 1,000 mg; Take 1,000 mg by mouth daily  -     Multiple Vitamin (multivitamin) tablet; Take 1 tablet by mouth daily  -     Zinc 50 MG CAPS; Take by mouth  -     Magnesium 500 MG CAPS; Take by mouth  -     ELDERBERRY PO; Take by mouth  -     Biotin 1 MG CAPS; Take by mouth          Subjective:      Patient ID: Jaiden Blair is a 50 y o  female  She has ASCVD and is s/p NSTEMI and bare metal stent  She had three episodes of CP, mostly emotional in nature, that required NTG  Her symptoms promptly responded to the NTG  She has no SOB, palpitations, or syncope/near syncope  She gets winded in the grocery store or at the end of the driveway  Her BP is well controlled on her current regimen  She has no HA or vision changes  She ha sno PND or orthopnea  Her lipids are at goal on her current regimen  She has no myalgia or muscle weakness  She is due for for a lipid panel and LFTs  She continues to have issues with her feet and ankles  She has pain with ambulation  She is due to see podiatry, has not done so because of COVD-19  She has a Lisfranc tear in the right foot  She has not had surgery do to personal stressors and other more immediate health issues  Her left ankle "locks up" with minimal exertion  She is UTD with COVID-19 and pneumococcal vaccinations  The following portions of the patient's history were reviewed and updated as appropriate:   She  has a past medical history of Angina pectoris ([facility] ), Anxiety, BMI 34 0-34 9,adult (3/29/2019), GERD (gastroesophageal reflux disease), Myocardial infarction [facility]), NSTEMI (non-ST elevated myocardial infarction) ([facility] ) (1/24/2016), and Shortness of breath    She   Patient Active Problem List    Diagnosis Date Noted    Chronic bronchitis, unspecified chronic bronchitis type ([facility] ) 06/17/2021    Dislocation of tarsometatarsal joint of right foot 06/17/2021    Gastroesophageal reflux disease 05/20/2020    Family history of colonic polyps 05/20/2020    Coronary artery disease involving native coronary artery of native heart with angina pectoris ([facility] ) 04/25/2019    Abnormal CT of the abdomen 01/15/2019    Palpitations 07/13/2018    Fatty food intolerance 06/25/2018    Left foot pain 06/25/2018    ASCVD (arteriosclerotic cardiovascular disease) 01/29/2016    Status post non-ST elevation myocardial infarction (NSTEMI) 01/29/2016    S/p bare metal coronary artery stent 01/26/2016    HLD (hyperlipidemia) 01/24/2016    Anxiety 02/12/2013    Sciatica 09/08/2008    Low back pain 04/14/2008     She  has a past surgical history that includes Toe Surgery; Tubal ligation; Coronary angioplasty with stent; Cardiac surgery; and Tubal ligation  Her family history includes Atrial fibrillation in her mother; Diabetes type II in her mother; Heart failure in her mother; Hypertension in her father and mother; Lung disease in her mother  She  reports that she has been smoking  She has never used smokeless tobacco  She reports that she does not drink alcohol and does not use drugs    Current Outpatient Medications   Medication Sig Dispense Refill    ALPRAZolam (XANAX) 1 mg tablet TAKE ONE TABLET BY MOUTH THREE TIMES A DAY AS NEEDED FOR ANXIETY 270 tablet 3    amLODIPine (NORVASC) 5 mg tablet TAKE ONE TABLET BY MOUTH EVERY DAY 90 tablet 3    ascorbic acid (VITAMIN C) 250 mg tablet Take 500 mg by mouth 2 (two) times a day      aspirin (Aspirin Adult Low Dose) 81 mg EC tablet Take 1 tablet (81 mg total) by mouth daily 90 tablet 3    atorvastatin (LIPITOR) 80 mg tablet TAKE ONE TABLET BY MOUTH EVERY DAY 90 tablet 3    Biotin 1 MG CAPS Take by mouth      ELDERBERRY PO Take by mouth      Magnesium 500 MG CAPS Take by mouth      metoprolol tartrate (LOPRESSOR) 50 mg tablet TAKE ONE TABLET BY MOUTH TWICE A  tablet 3    Multiple Vitamin (multivitamin) tablet Take 1 tablet by mouth daily      nitroglycerin (NITROSTAT) 0 4 mg SL tablet Place 1 tablet (0 4 mg total) under the tongue every 5 (five) minutes as needed for chest pain 100 tablet 5    Omega-3 Fatty Acids (fish oil) 1,000 mg Take 1,000 mg by mouth daily      omeprazole (PriLOSEC) 40 MG capsule Take 1 capsule (40 mg total) by mouth daily 90 capsule 3    Zinc 50 MG CAPS Take by mouth      Diclofenac Sodium (VOLTAREN) 1 % diclofenac 1 % topical gel      methylPREDNISolone 4 MG tablet therapy pack follow package directions       No current facility-administered medications for this visit       Current Outpatient Medications on File Prior to Visit   Medication Sig    ALPRAZolam (XANAX) 1 mg tablet TAKE ONE TABLET BY MOUTH THREE TIMES A DAY AS NEEDED FOR ANXIETY    ascorbic acid (VITAMIN C) 250 mg tablet Take 500 mg by mouth 2 (two) times a day    aspirin (Aspirin Adult Low Dose) 81 mg EC tablet Take 1 tablet (81 mg total) by mouth daily    Biotin 1 MG CAPS Take by mouth    ELDERBERRY PO Take by mouth    Magnesium 500 MG CAPS Take by mouth    Multiple Vitamin (multivitamin) tablet Take 1 tablet by mouth daily    nitroglycerin (NITROSTAT) 0 4 mg SL tablet Place 1 tablet (0 4 mg total) under the tongue every 5 (five) minutes as needed for chest pain    Omega-3 Fatty Acids (fish oil) 1,000 mg Take 1,000 mg by mouth daily    omeprazole (PriLOSEC) 40 MG capsule Take 1 capsule (40 mg total) by mouth daily    Zinc 50 MG CAPS Take by mouth    Diclofenac Sodium (VOLTAREN) 1 % diclofenac 1 % topical gel    methylPREDNISolone 4 MG tablet therapy pack follow package directions    [DISCONTINUED] amLODIPine (NORVASC) 5 mg tablet Take 1 tablet (5 mg total) by mouth daily    [DISCONTINUED] atorvastatin (Lipitor) 80 mg tablet Take 1 tablet (80 mg total) by mouth daily    [DISCONTINUED] metoprolol tartrate (Lopressor) 50 mg tablet Take 1 tablet (50 mg total) by mouth 2 (two) times a day    [DISCONTINUED] traZODone (DESYREL) 100 mg tablet Take 1 tablet (100 mg total) by mouth daily at bedtime No current facility-administered medications on file prior to visit  She is allergic to morphine       Review of Systems   Constitutional: Positive for activity change and unexpected weight change  Respiratory: Positive for chest tightness and shortness of breath  Cardiovascular: Positive for chest pain  Psychiatric/Behavioral: Positive for dysphoric mood and sleep disturbance  The patient is nervous/anxious  All other systems reviewed and are negative  Objective:      /76 (BP Location: Left arm, Patient Position: Sitting, Cuff Size: Standard)   Pulse 71   Temp 97 7 °F (36 5 °C)   Ht 5' 2" (1 575 m)   Wt 71 7 kg (158 lb)   SpO2 97%   BMI 28 90 kg/m²          Physical Exam  Vitals and nursing note reviewed  Constitutional:       Appearance: Normal appearance  She is normal weight  HENT:      Head: Normocephalic and atraumatic  Cardiovascular:      Rate and Rhythm: Normal rate and regular rhythm  Pulses: Normal pulses  Heart sounds: Normal heart sounds  Pulmonary:      Effort: Pulmonary effort is normal       Breath sounds: Normal breath sounds  Abdominal:      General: Abdomen is flat  Bowel sounds are normal       Palpations: Abdomen is soft  Musculoskeletal:         General: Normal range of motion  Cervical back: Normal range of motion and neck supple  Skin:     General: Skin is warm  Capillary Refill: Capillary refill takes less than 2 seconds  Neurological:      General: No focal deficit present  Mental Status: She is alert and oriented to person, place, and time  Mental status is at baseline  Psychiatric:         Mood and Affect: Mood normal          Behavior: Behavior normal          Thought Content:  Thought content normal          Judgment: Judgment normal

## 2021-06-17 NOTE — PROGRESS NOTES
BMI Counseling: Body mass index is 28 9 kg/m²  The BMI is above normal  Nutrition recommendations include reducing portion sizes, decreasing overall calorie intake, 3-5 servings of fruits/vegetables daily, reducing fast food intake, consuming healthier snacks, decreasing soda and/or juice intake, moderation in carbohydrate intake, increasing intake of lean protein, reducing intake of saturated fat and trans fat and reducing intake of cholesterol  Exercise recommendations include moderate aerobic physical activity for 150 minutes/week, vigorous aerobic physical activity for 75 minutes/week, exercising 3-5 times per week, joining a gym and strength training exercises  BMI Counseling: Body mass index is 28 9 kg/m²  The BMI is above normal  Nutrition recommendations include reducing portion sizes, decreasing overall calorie intake, 3-5 servings of fruits/vegetables daily, reducing fast food intake, consuming healthier snacks, decreasing soda and/or juice intake, moderation in carbohydrate intake, increasing intake of lean protein, reducing intake of saturated fat and trans fat and reducing intake of cholesterol  Exercise recommendations include moderate aerobic physical activity for 150 minutes/week, vigorous aerobic physical activity for 75 minutes/week, exercising 3-5 times per week, joining a gym and strength training exercises  Assessment and Plan:     Problem List Items Addressed This Visit     None      Visit Diagnoses     Screening for cervical cancer    -  Primary           Preventive health issues were discussed with patient, and age appropriate screening tests were ordered as noted in patient's After Visit Summary  Personalized health advice and appropriate referrals for health education or preventive services given if needed, as noted in patient's After Visit Summary  History of Present Illness:     [facility]       Patient Care Team:  Kendra Roberts MD as PCP - General  Emely Metz MD as PCP - [address] [facility] [facility] ([facility])  Spencer De Jesus DO (Cardiology)  Lana Polanco MD (Cardiology)     Review of Systems:     Review of Systems   Problem List:     Patient Active Problem List   Diagnosis    Anxiety    ASCVD (arteriosclerotic cardiovascular disease)    HLD (hyperlipidemia)    NSTEMI (non-ST elevated myocardial infarction) ([facility] [address] [facility]    S/p bare metal coronary artery stent    Status post non-ST elevation myocardial infarction (NSTEMI)    Fatty food intolerance    Left foot pain    Palpitations    Abnormal CT of the abdomen    BMI 34 0-34 9,adult    Coronary artery disease involving native coronary artery of native heart with angina pectoris (HCC)    Low back pain    Sciatica    Gastroesophageal reflux disease    Family history of colonic polyps      Past Medical and Surgical History:     Past Medical History:   Diagnosis Date    Angina pectoris ([facility] )     Anxiety     GERD (gastroesophageal reflux disease)     Myocardial infarction ([facility] )     1/24/16    Shortness of breath      Past Surgical History:   Procedure Laterality Date    CARDIAC SURGERY      CORONARY ANGIOPLASTY WITH STENT PLACEMENT      TOE SURGERY      TUBAL LIGATION      TUBAL LIGATION        Family History:     Family History   Problem Relation Age of Onset    Atrial fibrillation Mother     Heart failure Mother     Diabetes type II Mother     Hypertension Mother     Lung disease Mother         Pleural Effusion    Hypertension Father       Social History:     Social History     Socioeconomic History    Marital status:      Spouse name: Not on file    Number of children: Not on file    Years of education: Not on file    Highest education level: Not on file   Occupational History    Not on file   Tobacco Use    Smoking status: Current Every Day Smoker    Smokeless tobacco: Never Used    Tobacco comment: 4 cigs daily   Substance and Sexual Activity    Alcohol use: No    Drug use: No    Sexual activity: Not on file   Other Topics Concern    Not on file   Social History Narrative    Not on file     Social Determinants of Health     Financial Resource Strain:     Difficulty of Paying Living Expenses:    Food Insecurity:     Worried About Running Out of Food in the Last Year:     [address] in the Last Year:    Transportation Needs:     Lack of Transportation (Medical):      Lack of Transportation (Non-Medical):    Physical Activity:     Days of Exercise per Week:     Minutes of Exercise per Session:    Stress:     Feeling of Stress :    Social Connections:     Frequency of Communication with Friends and Family:     Frequency of Social Gatherings with Friends and Family:     Attends [employer] Services:     Active Member of Clubs or Organizations:     Attends Club or Organization Meetings:     Marital Status:    Intimate Partner Violence:     Fear of Current or Ex-Partner:     Emotionally Abused:     Physically Abused:     Sexually Abused:       Medications and Allergies:     Current Outpatient Medications   Medication Sig Dispense Refill    ALPRAZolam (XANAX) 1 mg tablet TAKE ONE TABLET BY MOUTH THREE TIMES A DAY AS NEEDED FOR ANXIETY 270 tablet 3    amLODIPine (NORVASC) 5 mg tablet TAKE ONE TABLET BY MOUTH EVERY DAY 90 tablet 3    aspirin (Aspirin Adult Low Dose) 81 mg EC tablet Take 1 tablet (81 mg total) by mouth daily 90 tablet 3    atorvastatin (LIPITOR) 80 mg tablet TAKE ONE TABLET BY MOUTH EVERY DAY 90 tablet 3    Diclofenac Sodium (VOLTAREN) 1 % diclofenac 1 % topical gel      methylPREDNISolone 4 MG tablet therapy pack follow package directions      metoprolol tartrate (LOPRESSOR) 50 mg tablet TAKE ONE TABLET BY MOUTH TWICE A  tablet 3    nitroglycerin (NITROSTAT) 0 4 mg SL tablet Place 1 tablet (0 4 mg total) under the tongue every 5 (five) minutes as needed for chest pain 100 tablet 5    omeprazole (PriLOSEC) 40 MG capsule Take 1 capsule (40 mg total) by mouth daily 90 capsule 3    traZODone (DESYREL) 100 mg tablet TAKE ONE TABLET BY MOUTH AT BEDTIME 90 tablet 3     No current facility-administered medications for this visit  Allergies   Allergen Reactions    Morphine Confusion and Delirium      Immunizations:     Immunization History   Administered Date(s) Administered    INFLUENZA 08/17/2016, 08/02/2017, 08/23/2018    Influenza, injectable, quadrivalent, preservative free 0 5 mL 08/23/2018    Influenza, recombinant, quadrivalent,injectable, preservative free 08/15/2020    Pneumococcal Polysaccharide PPV23 06/03/2020    SARS-CoV-2 / COVID-19 mRNA IM ([employer]) 02/28/2021, 03/27/2021    Zoster Vaccine Recombinant 09/15/2020      Health Maintenance:         Topic Date Due    Hepatitis C Screening  Never done    MAMMOGRAM  Never done    HIV Screening  Never done    Cervical Cancer Screening  Never done    Colorectal Cancer Screening  08/16/2029         Topic Date Due    DTaP,Tdap,and Td Vaccines (1 - Tdap) Never done      Medicare Screening Tests and Risk Assessments:     Tracy Thakkar is here for her Subsequent Wellness visit  Last Medicare Wellness visit information reviewed, patient interviewed and updates made to the record today  Health Risk Assessment:   Patient rates overall health as fair  Patient feels that their physical health rating is same  Patient is satisfied with their life  Eyesight was rated as same  Hearing was rated as same  Patient feels that their emotional and mental health rating is same  Patients states they are never, rarely angry  Patient states they are sometimes unusually tired/fatigued  Pain experienced in the last 7 days has been a lot  Patient's pain rating has been 8/10  Patient states that she has experienced weight loss or gain in last 6 months  Depression Screening:   PHQ-2 Score: 1      Fall Risk Screening:    In the past year, patient has experienced: history of falling in past year    Number of falls: 2 or more  Injured during fall?: Yes    Feels unsteady when standing or walking?: Yes    Worried about falling?: No      Urinary Incontinence Screening:   Patient has not leaked urine accidently in the last six months  Home Safety:  Patient does not have trouble with stairs inside or outside of their home  Patient has working smoke alarms and has working carbon monoxide detector  Home safety hazards include: none  Nutrition:   Current diet is Regular  Medications:   Patient is currently taking over-the-counter supplements  OTC medications include: see medication list  Patient is able to manage medications  Activities of Daily Living (ADLs)/Instrumental Activities of Daily Living (IADLs):   Walk and transfer into and out of bed and chair?: Yes  Dress and groom yourself?: Yes    Bathe or shower yourself?: Yes    Feed yourself?  Yes  Do your laundry/housekeeping?: Yes  Manage your money, pay your bills and track your expenses?: Yes  Make your own meals?: Yes    Do your own shopping?: Yes    Advance Care Planning:   Living will: Yes    Advanced directive: Yes      Cognitive Screening:   Provider or family/friend/caregiver concerned regarding cognition?: No    PREVENTIVE SCREENINGS      Cardiovascular Screening:    General: Screening Not Indicated and History Lipid Disorder      Diabetes Screening:     General: Risks and Benefits Discussed    Due for: Blood Glucose      Colorectal Cancer Screening:     General: Screening Current      Breast Cancer Screening:     General: Risks and Benefits Discussed    Due for: Mammogram        Cervical Cancer Screening:    General: Risks and Benefits Discussed    Due for: Cervical Pap Smear      Osteoporosis Screening:    General: Screening Not Indicated      Abdominal Aortic Aneurysm (AAA) Screening:        General: Screening Not Indicated      Lung Cancer Screening:     General: Screening Not Indicated      Hepatitis C Screening: General: Screening Current    Screening, Brief Intervention, and Referral to Treatment (SBIRT)    Screening  Typical number of drinks in a day: 0    Single Item Drug Screening:  How often have you used an illegal drug (including marijuana) or a prescription medication for non-medical reasons in the past year? never    Single Item Drug Screen Score: 0  Interpretation: Negative screen for possible drug use disorder    No exam data present     Physical Exam:     There were no vitals taken for this visit      Physical Exam     Hayley Hare MD

## 2021-06-18 ENCOUNTER — TELEPHONE (OUTPATIENT)
Dept: FAMILY MEDICINE CLINIC | Facility: CLINIC | Age: 56
End: 2021-06-18

## 2021-06-18 DIAGNOSIS — I25.10 CORONARY ARTERY DISEASE INVOLVING NATIVE CORONARY ARTERY OF NATIVE HEART WITHOUT ANGINA PECTORIS: Primary | ICD-10-CM

## 2021-06-18 DIAGNOSIS — M79.672 LEFT FOOT PAIN: ICD-10-CM

## 2021-06-18 DIAGNOSIS — I21.4 NSTEMI (NON-ST ELEVATED MYOCARDIAL INFARCTION) (HCC): ICD-10-CM

## 2021-06-25 DIAGNOSIS — R10.13 DYSPEPSIA: ICD-10-CM

## 2021-06-25 DIAGNOSIS — K21.9 GASTROESOPHAGEAL REFLUX DISEASE: ICD-10-CM

## 2021-06-25 RX ORDER — OMEPRAZOLE 40 MG/1
CAPSULE, DELAYED RELEASE ORAL
Qty: 90 CAPSULE | Refills: 3 | Status: SHIPPED | OUTPATIENT
Start: 2021-06-25 | End: 2021-10-28

## 2021-08-13 DIAGNOSIS — F41.9 ANXIETY: ICD-10-CM

## 2021-08-13 RX ORDER — ALPRAZOLAM 1 MG/1
TABLET ORAL
Qty: 270 TABLET | Refills: 3 | Status: SHIPPED | OUTPATIENT
Start: 2021-08-13 | End: 2022-06-02 | Stop reason: SDUPTHER

## 2021-08-16 ENCOUNTER — CONSULT (OUTPATIENT)
Dept: CARDIOLOGY CLINIC | Facility: HOSPITAL | Age: 56
End: 2021-08-16
Attending: FAMILY MEDICINE
Payer: COMMERCIAL

## 2021-08-16 VITALS
SYSTOLIC BLOOD PRESSURE: 112 MMHG | HEART RATE: 78 BPM | DIASTOLIC BLOOD PRESSURE: 72 MMHG | HEIGHT: 62 IN | WEIGHT: 157 LBS | BODY MASS INDEX: 28.89 KG/M2

## 2021-08-16 DIAGNOSIS — I21.4 NSTEMI (NON-ST ELEVATED MYOCARDIAL INFARCTION) (HCC): ICD-10-CM

## 2021-08-16 DIAGNOSIS — E78.2 MIXED HYPERLIPIDEMIA: ICD-10-CM

## 2021-08-16 DIAGNOSIS — I25.119 CORONARY ARTERY DISEASE INVOLVING NATIVE CORONARY ARTERY OF NATIVE HEART WITH ANGINA PECTORIS (HCC): ICD-10-CM

## 2021-08-16 DIAGNOSIS — I25.10 CORONARY ARTERY DISEASE INVOLVING NATIVE CORONARY ARTERY OF NATIVE HEART WITHOUT ANGINA PECTORIS: ICD-10-CM

## 2021-08-16 DIAGNOSIS — R00.2 PALPITATIONS: ICD-10-CM

## 2021-08-16 DIAGNOSIS — F41.9 ANXIETY: ICD-10-CM

## 2021-08-16 DIAGNOSIS — I25.10 ASCVD (ARTERIOSCLEROTIC CARDIOVASCULAR DISEASE): Primary | ICD-10-CM

## 2021-08-16 DIAGNOSIS — Z95.5 S/P BARE METAL CORONARY ARTERY STENT: ICD-10-CM

## 2021-08-16 PROCEDURE — 3008F BODY MASS INDEX DOCD: CPT | Performed by: INTERNAL MEDICINE

## 2021-08-16 PROCEDURE — 99204 OFFICE O/P NEW MOD 45 MIN: CPT | Performed by: INTERNAL MEDICINE

## 2021-08-16 NOTE — PROGRESS NOTES
Rey Scott  1965  [id number]  [id number]  [phone]  [phone]    1  ASCVD (arteriosclerotic cardiovascular disease)     2  Coronary artery disease involving native coronary artery of native heart without angina pectoris  Ambulatory referral to Cardiology    NM myocardial perfusion spect (rx stress and/or rest)    Echo complete with contrast if indicated    VAS screening    CANCELED: Echo stress test w contrast if indicated   3  NSTEMI (non-ST elevated myocardial infarction) [facility])  Ambulatory referral to Cardiology    Echo complete with contrast if indicated    CANCELED: Echo stress test w contrast if indicated   4  Coronary artery disease involving native coronary artery of native heart with angina pectoris ([address] )     5  S/p bare metal coronary artery stent     6  Mixed hyperlipidemia     7  Anxiety     8  Palpitations  NM myocardial perfusion spect (rx stress and/or rest)    Echo complete with contrast if indicated    CANCELED: Echo stress test w contrast if indicated       Discussion/Summary:   1  Coronary artery disease prior PCI with bare metal stent to 1st diagonal in 2016, last catheterization 2019 showed no obstructive lesions mild to moderate residual disease   2  Preserved LV systolic function   3  Tobacco abuse   4  Hyperlipidemia   5  Palpitations    Recommendations:  From a cardiac standpoint she has been fairly stable with some mild palpitations and an episode of chest discomfort  She is unable to walk on a treadmill due to lower extremity arthritis I have recommended a Lexiscan Myoview  She also needs an echocardiogram and she will be scheduled for a vascular screening  Will assess for any ischemic burden  Blood pressure is well controlled  Lipids have been well controlled on high-intensity atorvastatin  I did  need to quit smoking she is almost off cigarettes completely          Interval History:   53-year-old female who had non-STEMI in 2016 receiving a bare metal stent to the 1st diagonal, subsequent catheterization in 2019 showed no obstructive lesions requiring PCI, hyperlipidemia, tobacco abuse presents to establish care with me in the office  She has been under some stress recently in throughout [facility] during some episode she has had some chest pain with radiation up towards the neck associated with palpitations  Denies any significant dyspnea  There has been no lower extremity edema, PND, orthopnea  Overall she has been fairly active around the house she has been taking medications as prescribed  She has lost a significant amount of weight and is also trying to quit smoking  She is down to only 1 or 2  Cigarettes a day    Medical Problems     Problem List     Anxiety    ASCVD (arteriosclerotic cardiovascular disease)    Overview Signed 1/15/2019  7:53 AM by Rashmi Forbes MD     Atypical chest pain         HLD (hyperlipidemia)    S/p bare metal coronary artery stent    Overview Signed 4/27/2018  8:34 AM by Rashmi Forbes MD     Overview:   2 0 x 15mm bare metal stent ([employer]) 1/25/2016 for severe diag lesion and NSTEMI w/preceeding rest angina  [facility]            Status post non-ST elevation myocardial infarction (NSTEMI)    Fatty food intolerance    Overview Deleted 6/21/2019  8:32 AM by Rashmi Forbes MD            Left foot pain    Palpitations    Abnormal CT of the abdomen    Coronary artery disease involving native coronary artery of native heart with angina pectoris ([facility] [address] )    Overview Signed 4/30/2019 11:00 AM by PITO Jorge     Mid Circumflex stenosis 30%         Low back pain    Sciatica    Gastroesophageal reflux disease    Family history of colonic polyps    Chronic bronchitis, unspecified chronic bronchitis type ([address] )    Dislocation of tarsometatarsal joint of right foot              Past Medical History:   Diagnosis Date    Angina pectoris ([address] )     Anxiety     BMI 34 0-34 9,adult 3/29/2019    GERD (gastroesophageal reflux disease)     Myocardial infarction ([facility] )     1/24/16    NSTEMI (non-ST elevated myocardial infarction) ([facility] ) 1/24/2016    Overview:  Max trop I 1 77([facility]) w/nml LVEF and wall motion   Shortness of breath      Social History     Socioeconomic History    Marital status:      Spouse name: Not on file    Number of children: Not on file    Years of education: Not on file    Highest education level: Not on file   Occupational History    Not on file   Tobacco Use    Smoking status: Current Every Day Smoker    Smokeless tobacco: Never Used    Tobacco comment: 1 cig daily    Vaping Use    Vaping Use: Never used   Substance and Sexual Activity    Alcohol use: No    Drug use: No    Sexual activity: Not on file   Other Topics Concern    Not on file   Social History Narrative    Not on file     Social Determinants of Health     Financial Resource Strain:     Difficulty of Paying Living Expenses:    Food Insecurity:     Worried About Running Out of Food in the Last Year:     Ran Out of Food in the Last Year:    Transportation Needs:     Lack of Transportation (Medical):      Lack of Transportation (Non-Medical):    Physical Activity:     Days of Exercise per Week:     Minutes of Exercise per Session:    Stress:     Feeling of Stress :    Social Connections:     Frequency of Communication with Friends and Family:     Frequency of Social Gatherings with Friends and Family:     Attends [employer] Services:     Active Member of Clubs or Organizations:     Attends Club or Organization Meetings:     Marital Status:    Intimate Partner Violence:     Fear of Current or Ex-Partner:     Emotionally Abused:     Physically Abused:     Sexually Abused:       Family History   Problem Relation Age of Onset    Atrial fibrillation Mother     Heart failure Mother     Diabetes type II Mother     Hypertension Mother     Lung disease Mother         Pleural Effusion    Hypertension Father      Past Surgical History:   Procedure Laterality Date    CARDIAC SURGERY      CORONARY ANGIOPLASTY WITH STENT PLACEMENT      TOE SURGERY      TUBAL LIGATION      TUBAL LIGATION         Current Outpatient Medications:     ALPRAZolam (XANAX) 1 mg tablet, TAKE ONE TABLET BY MOUTH THREE TIMES A DAY AS NEEDED FOR ANXIETY, Disp: 270 tablet, Rfl: 3    amLODIPine (NORVASC) 5 mg tablet, TAKE ONE TABLET BY MOUTH EVERY DAY, Disp: 90 tablet, Rfl: 3    ascorbic acid (VITAMIN C) 250 mg tablet, Take 1,500 mg by mouth daily , Disp: , Rfl:     aspirin (Aspirin Adult Low Dose) 81 mg EC tablet, Take 1 tablet (81 mg total) by mouth daily, Disp: 90 tablet, Rfl: 3    atorvastatin (LIPITOR) 80 mg tablet, TAKE ONE TABLET BY MOUTH EVERY DAY, Disp: 90 tablet, Rfl: 3    Biotin 1 MG CAPS, Take by mouth, Disp: , Rfl:     Diclofenac Sodium (VOLTAREN) 1 %, diclofenac 1 % topical gel, Disp: , Rfl:     ELDERBERRY PO, Take by mouth, Disp: , Rfl:     Magnesium 500 MG CAPS, Take by mouth, Disp: , Rfl:     metoprolol tartrate (LOPRESSOR) 50 mg tablet, TAKE ONE TABLET BY MOUTH TWICE A DAY, Disp: 180 tablet, Rfl: 3    nitroglycerin (NITROSTAT) 0 4 mg SL tablet, Place 1 tablet (0 4 mg total) under the tongue every 5 (five) minutes as needed for chest pain, Disp: 100 tablet, Rfl: 5    Omega-3 Fatty Acids (fish oil) 1,000 mg, Take 1,000 mg by mouth daily, Disp: , Rfl:     omeprazole (PriLOSEC) 40 MG capsule, TAKE ONE CAPSULE BY MOUTH EVERY DAY, Disp: 90 capsule, Rfl: 3    Zinc 50 MG CAPS, Take by mouth, Disp: , Rfl:     methylPREDNISolone 4 MG tablet therapy pack, follow package directions (Patient not taking: Reported on 8/16/2021), Disp: , Rfl:     Multiple Vitamin (multivitamin) tablet, Take 1 tablet by mouth daily (Patient not taking: Reported on 8/16/2021), Disp: , Rfl:   Allergies   Allergen Reactions    Morphine Confusion and Delirium       Labs:     Chemistry        Component Value Date/Time    K 4 4 06/26/2018 0623     06/26/2018 0623    CO2 24 06/26/2018 0623    BUN 10 06/26/2018 0623    CREATININE 0 92 06/26/2018 0623        Component Value Date/Time    CALCIUM 9 0 06/26/2018 0623    ALKPHOS 128 06/26/2018 0623    AST 21 06/26/2018 0623    ALT 30 (H) 06/26/2018 0344            No results found for: CHOL  No results found for: HDL  No results found for: LDLCALC  No results found for: TRIG  No results found for: CHOLHDL    Imaging: No results found  ECG:   Sinus rhythm poor R-wave progression      Review of Systems   Constitutional: Negative  HENT: Negative  Eyes: Negative  Cardiovascular: Positive for chest pain and palpitations  Negative for dyspnea on exertion  Respiratory: Negative  Endocrine: Negative  Hematologic/Lymphatic: Negative  Skin: Negative  Musculoskeletal: Negative  Gastrointestinal: Negative  Genitourinary: Negative  Neurological: Negative  Psychiatric/Behavioral: Negative  All other systems reviewed and are negative  Vitals:    08/16/21 1250   BP: 112/72   Pulse: 78     Vitals:    08/16/21 1250   Weight: 71 2 kg (157 lb)     Height: 5' 2" (157 5 cm)   Body mass index is 28 72 kg/m²  Physical Exam:  Vital signs reviewed  General appearance:  Appears stated age, alert, well appearing and in no distress  HEENT:  PERRLA, EOMI, no scleral icterus, no conjunctival pallor  NECK:  Supple, No elevated JVP, no thyromegaly, no carotid bruits, no JVD  HEART:  Regular rate and rhythm, normal S1/S2, no S3/S4, no murmur or rub, PMI nondisplaced  LUNGS:  Clear to auscultation bilaterally, no wheezes rales or rhonchi  ABDOMEN:  Soft, non-tender, positive bowel sounds, no rebound or guarding, no organomegaly   EXTREMITIES:  Normal range of motion  No clubbing or cyanosis   No edema  VASCULAR:  Normal pedal pulses   SKIN: No lesions or rashes on exposed skin  NEURO:  CN II-XII intact, no focal deficits

## 2021-09-01 ENCOUNTER — HOSPITAL ENCOUNTER (OUTPATIENT)
Dept: NUCLEAR MEDICINE | Facility: HOSPITAL | Age: 56
Discharge: HOME | End: 2021-09-01
Attending: INTERNAL MEDICINE
Payer: COMMERCIAL

## 2021-09-01 ENCOUNTER — HOSPITAL ENCOUNTER (OUTPATIENT)
Dept: NON INVASIVE DIAGNOSTICS | Facility: HOSPITAL | Age: 56
Discharge: HOME | End: 2021-09-01
Attending: INTERNAL MEDICINE
Payer: COMMERCIAL

## 2021-09-01 DIAGNOSIS — I25.10 CORONARY ARTERY DISEASE INVOLVING NATIVE CORONARY ARTERY OF NATIVE HEART WITHOUT ANGINA PECTORIS: ICD-10-CM

## 2021-09-01 DIAGNOSIS — R00.2 PALPITATIONS: ICD-10-CM

## 2021-09-01 DIAGNOSIS — I21.4 NSTEMI (NON-ST ELEVATED MYOCARDIAL INFARCTION) (HCC): ICD-10-CM

## 2021-09-01 PROCEDURE — 78452 HT MUSCLE IMAGE SPECT MULT: CPT

## 2021-09-01 PROCEDURE — 93018 CV STRESS TEST I&R ONLY: CPT | Performed by: INTERNAL MEDICINE

## 2021-09-01 PROCEDURE — A9502 TC99M TETROFOSMIN: HCPCS

## 2021-09-01 PROCEDURE — 93306 TTE W/DOPPLER COMPLETE: CPT

## 2021-09-01 PROCEDURE — 78452 HT MUSCLE IMAGE SPECT MULT: CPT | Performed by: INTERNAL MEDICINE

## 2021-09-01 PROCEDURE — 93306 TTE W/DOPPLER COMPLETE: CPT | Performed by: INTERNAL MEDICINE

## 2021-09-01 PROCEDURE — G1004 CDSM NDSC: HCPCS

## 2021-09-01 PROCEDURE — 93016 CV STRESS TEST SUPVJ ONLY: CPT | Performed by: INTERNAL MEDICINE

## 2021-09-01 PROCEDURE — 93017 CV STRESS TEST TRACING ONLY: CPT

## 2021-09-01 RX ADMIN — REGADENOSON 0.4 MG: 0.08 INJECTION, SOLUTION INTRAVENOUS at 09:10

## 2021-09-07 ENCOUNTER — HOSPITAL ENCOUNTER (OUTPATIENT)
Dept: NON INVASIVE DIAGNOSTICS | Facility: HOSPITAL | Age: 56
Discharge: HOME | End: 2021-09-07
Attending: INTERNAL MEDICINE
Payer: COMMERCIAL

## 2021-09-07 DIAGNOSIS — I25.10 CORONARY ARTERY DISEASE INVOLVING NATIVE CORONARY ARTERY OF NATIVE HEART WITHOUT ANGINA PECTORIS: ICD-10-CM

## 2021-09-07 PROCEDURE — 93922 UPR/L XTREMITY ART 2 LEVELS: CPT

## 2021-09-07 PROCEDURE — VASC: Performed by: SURGERY

## 2021-10-28 ENCOUNTER — OFFICE VISIT (OUTPATIENT)
Dept: FAMILY MEDICINE CLINIC | Facility: CLINIC | Age: 56
End: 2021-10-28
Payer: COMMERCIAL

## 2021-10-28 VITALS
WEIGHT: 149 LBS | BODY MASS INDEX: 27.42 KG/M2 | HEIGHT: 62 IN | SYSTOLIC BLOOD PRESSURE: 116 MMHG | DIASTOLIC BLOOD PRESSURE: 74 MMHG | TEMPERATURE: 97.7 F | OXYGEN SATURATION: 97 % | HEART RATE: 81 BPM

## 2021-10-28 DIAGNOSIS — Z23 NEEDS FLU SHOT: Primary | ICD-10-CM

## 2021-10-28 DIAGNOSIS — J32.9 SINUSITIS, UNSPECIFIED CHRONICITY, UNSPECIFIED LOCATION: ICD-10-CM

## 2021-10-28 PROCEDURE — 3008F BODY MASS INDEX DOCD: CPT | Performed by: FAMILY MEDICINE

## 2021-10-28 PROCEDURE — 99214 OFFICE O/P EST MOD 30 MIN: CPT | Performed by: FAMILY MEDICINE

## 2021-10-28 RX ORDER — OMEPRAZOLE 20 MG/1
20 CAPSULE, DELAYED RELEASE ORAL
Qty: 30 CAPSULE | Refills: 5 | Status: SHIPPED | OUTPATIENT
Start: 2021-10-28 | End: 2022-06-02 | Stop reason: SDUPTHER

## 2021-10-28 RX ORDER — AMOXICILLIN 500 MG/1
500 CAPSULE ORAL EVERY 8 HOURS SCHEDULED
Qty: 21 CAPSULE | Refills: 0 | Status: SHIPPED | OUTPATIENT
Start: 2021-10-28 | End: 2021-11-04

## 2022-01-14 ENCOUNTER — VBI (OUTPATIENT)
Dept: ADMINISTRATIVE | Facility: OTHER | Age: 57
End: 2022-01-14

## 2022-03-23 ENCOUNTER — VBI (OUTPATIENT)
Dept: ADMINISTRATIVE | Facility: OTHER | Age: 57
End: 2022-03-23

## 2022-04-25 ENCOUNTER — TELEPHONE (OUTPATIENT)
Dept: CARDIOLOGY CLINIC | Facility: HOSPITAL | Age: 57
End: 2022-04-25

## 2022-04-25 NOTE — TELEPHONE ENCOUNTER
Attempted to contact Chelsi to schedule her next appointment with cardiology  A letter has been sent to have hr contact our office

## 2022-05-16 ENCOUNTER — TELEPHONE (OUTPATIENT)
Dept: OTHER | Facility: OTHER | Age: 57
End: 2022-05-16

## 2022-05-16 NOTE — TELEPHONE ENCOUNTER
Patient is calling to request for an appointment due to she needs medication refills and annual blood work  Please give her a call back to set up  She has ran out of one of her medications

## 2022-06-02 ENCOUNTER — OFFICE VISIT (OUTPATIENT)
Dept: FAMILY MEDICINE CLINIC | Facility: CLINIC | Age: 57
End: 2022-06-02
Payer: COMMERCIAL

## 2022-06-02 VITALS
HEIGHT: 62 IN | RESPIRATION RATE: 18 BRPM | HEART RATE: 76 BPM | WEIGHT: 153.2 LBS | SYSTOLIC BLOOD PRESSURE: 124 MMHG | DIASTOLIC BLOOD PRESSURE: 70 MMHG | OXYGEN SATURATION: 97 % | TEMPERATURE: 97.9 F | BODY MASS INDEX: 28.19 KG/M2

## 2022-06-02 DIAGNOSIS — J42 CHRONIC BRONCHITIS, UNSPECIFIED CHRONIC BRONCHITIS TYPE (HCC): ICD-10-CM

## 2022-06-02 DIAGNOSIS — F41.9 ANXIETY: ICD-10-CM

## 2022-06-02 DIAGNOSIS — E78.5 HYPERLIPIDEMIA, UNSPECIFIED HYPERLIPIDEMIA TYPE: ICD-10-CM

## 2022-06-02 DIAGNOSIS — I10 ESSENTIAL HYPERTENSION: ICD-10-CM

## 2022-06-02 DIAGNOSIS — I20.8 ANGINA AT REST (HCC): ICD-10-CM

## 2022-06-02 DIAGNOSIS — Z12.39 ENCOUNTER FOR SCREENING FOR MALIGNANT NEOPLASM OF BREAST, UNSPECIFIED SCREENING MODALITY: ICD-10-CM

## 2022-06-02 DIAGNOSIS — J32.9 SINUSITIS, UNSPECIFIED CHRONICITY, UNSPECIFIED LOCATION: ICD-10-CM

## 2022-06-02 DIAGNOSIS — Z11.4 SCREENING FOR HIV (HUMAN IMMUNODEFICIENCY VIRUS): ICD-10-CM

## 2022-06-02 DIAGNOSIS — Z12.4 SCREENING FOR CERVICAL CANCER: ICD-10-CM

## 2022-06-02 DIAGNOSIS — I25.10 CORONARY ARTERY DISEASE INVOLVING NATIVE CORONARY ARTERY OF NATIVE HEART WITHOUT ANGINA PECTORIS: Primary | ICD-10-CM

## 2022-06-02 PROCEDURE — 3725F SCREEN DEPRESSION PERFORMED: CPT | Performed by: FAMILY MEDICINE

## 2022-06-02 PROCEDURE — 99214 OFFICE O/P EST MOD 30 MIN: CPT | Performed by: FAMILY MEDICINE

## 2022-06-02 PROCEDURE — 3008F BODY MASS INDEX DOCD: CPT | Performed by: FAMILY MEDICINE

## 2022-06-02 RX ORDER — ALPRAZOLAM 1 MG/1
1 TABLET ORAL 3 TIMES DAILY PRN
Qty: 270 TABLET | Refills: 3 | Status: SHIPPED | OUTPATIENT
Start: 2022-06-02

## 2022-06-02 RX ORDER — OMEPRAZOLE 20 MG/1
20 CAPSULE, DELAYED RELEASE ORAL
Qty: 30 CAPSULE | Refills: 5 | Status: SHIPPED | OUTPATIENT
Start: 2022-06-02

## 2022-06-02 RX ORDER — ATORVASTATIN CALCIUM 80 MG/1
80 TABLET, FILM COATED ORAL DAILY
Qty: 90 TABLET | Refills: 3 | Status: SHIPPED | OUTPATIENT
Start: 2022-06-02

## 2022-06-02 RX ORDER — AMLODIPINE BESYLATE 5 MG/1
5 TABLET ORAL DAILY
Qty: 90 TABLET | Refills: 3 | Status: SHIPPED | OUTPATIENT
Start: 2022-06-02

## 2022-06-02 RX ORDER — METOPROLOL TARTRATE 50 MG/1
50 TABLET, FILM COATED ORAL 2 TIMES DAILY
Qty: 180 TABLET | Refills: 3 | Status: SHIPPED | OUTPATIENT
Start: 2022-06-02

## 2022-06-02 NOTE — PROGRESS NOTES
Assessment/Plan:    No problem-specific Assessment & Plan notes found for this encounter  Diagnoses and all orders for this visit:    Coronary artery disease involving native coronary artery of native heart without angina pectoris  -     metoprolol tartrate (LOPRESSOR) 50 mg tablet; Take 1 tablet (50 mg total) by mouth 2 (two) times a day    Screening for HIV (human immunodeficiency virus)  -     HIV 1/2 Antigen/Antibody (4th Generation) w Reflex [facility]; Future    Anxiety  -     ALPRAZolam (XANAX) 1 mg tablet; Take 1 tablet (1 mg total) by mouth 3 (three) times a day as needed for anxiety    Essential hypertension  -     amLODIPine (NORVASC) 5 mg tablet; Take 1 tablet (5 mg total) by mouth daily    Hyperlipidemia, unspecified hyperlipidemia type  -     atorvastatin (LIPITOR) 80 mg tablet; Take 1 tablet (80 mg total) by mouth daily    Sinusitis, unspecified chronicity, unspecified location  -     omeprazole (PriLOSEC) 20 mg delayed release capsule; Take 1 capsule (20 mg total) by mouth daily before breakfast    Encounter for screening for malignant neoplasm of breast, unspecified screening modality  -     Mammo screening bilateral w 3d & cad; Future    Screening for cervical cancer  -     Ambulatory Referral to Obstetrics / Gynecology; Future    Chronic bronchitis, unspecified chronic bronchitis type (HCC)    Angina at rest [facility])          Subjective:      Patient ID: Antoine Michele is a 49 y o  female  Her BP is at goal on her current regimen  She has no CP or SOB  She has no HA or vision changes  She has ASCVD  She denies CP or SOB  She has no syncope, near syncope, or palpitations  She has hyperlipidemia  She has normal liver function testing and no myalgia or muscle weakness  She quit cigarette smoking, although she vapes        The following portions of the patient's history were reviewed and updated as appropriate:   She  has a past medical history of Angina pectoris ([address] ), Anxiety, BMI 34 0-34 9,adult (3/29/2019), GERD (gastroesophageal reflux disease), Myocardial infarction [facility]), NSTEMI (non-ST elevated myocardial infarction) ([facility] ) (1/24/2016), and Shortness of breath  She   Patient Active Problem List    Diagnosis Date Noted    Chronic bronchitis, unspecified chronic bronchitis type ([place] [address] ) 06/17/2021    Dislocation of tarsometatarsal joint of right foot 06/17/2021    Gastroesophageal reflux disease 05/20/2020    Family history of colonic polyps 05/20/2020    Coronary artery disease involving native coronary artery of native heart with angina pectoris ([facility] ) 04/25/2019    Abnormal CT of the abdomen 01/15/2019    Palpitations 07/13/2018    Fatty food intolerance 06/25/2018    Left foot pain 06/25/2018    ASCVD (arteriosclerotic cardiovascular disease) 01/29/2016    Status post non-ST elevation myocardial infarction (NSTEMI) 01/29/2016    S/p bare metal coronary artery stent 01/26/2016    HLD (hyperlipidemia) 01/24/2016    Anxiety 02/12/2013    Sciatica 09/08/2008    Low back pain 04/14/2008     She  has a past surgical history that includes Toe Surgery; Tubal ligation; Coronary angioplasty with stent; Cardiac surgery; and Tubal ligation  Her family history includes Atrial fibrillation in her mother; Diabetes type II in her mother; Heart failure in her mother; Hypertension in her father and mother; Lung disease in her mother  She  reports that she has been smoking cigarettes  She has never used smokeless tobacco  She reports that she does not drink alcohol and does not use drugs    Current Outpatient Medications   Medication Sig Dispense Refill    ALPRAZolam (XANAX) 1 mg tablet Take 1 tablet (1 mg total) by mouth 3 (three) times a day as needed for anxiety 270 tablet 3    amLODIPine (NORVASC) 5 mg tablet Take 1 tablet (5 mg total) by mouth daily 90 tablet 3    atorvastatin (LIPITOR) 80 mg tablet Take 1 tablet (80 mg total) by mouth daily 90 tablet 3    metoprolol tartrate (LOPRESSOR) 50 mg tablet Take 1 tablet (50 mg total) by mouth 2 (two) times a day 180 tablet 3    omeprazole (PriLOSEC) 20 mg delayed release capsule Take 1 capsule (20 mg total) by mouth daily before breakfast 30 capsule 5    ascorbic acid (VITAMIN C) 250 mg tablet Take 1,500 mg by mouth daily  (Patient not taking: Reported on 6/2/2022)      aspirin (Aspirin Adult Low Dose) 81 mg EC tablet Take 1 tablet (81 mg total) by mouth daily (Patient not taking: Reported on 6/2/2022) 90 tablet 3    Biotin 1 MG CAPS Take by mouth (Patient not taking: Reported on 6/2/2022)      Diclofenac Sodium (VOLTAREN) 1 % diclofenac 1 % topical gel (Patient not taking: Reported on 6/2/2022)      ELDERBERRY PO Take by mouth (Patient not taking: Reported on 6/2/2022)      Magnesium 500 MG CAPS Take by mouth (Patient not taking: Reported on 6/2/2022)      Multiple Vitamin (multivitamin) tablet Take 1 tablet by mouth daily (Patient not taking: No sig reported)      nitroglycerin (NITROSTAT) 0 4 mg SL tablet PLACE 1 TABLET UNDER THE TONGUE EVERY 5 MINUTES UP TO 3 DOSES AS NEEDED IF NO RELIEF CALL 911 OR GO TO ER (Patient not taking: Reported on 6/2/2022) 100 tablet 5    Omega-3 Fatty Acids (fish oil) 1,000 mg Take 1,000 mg by mouth daily (Patient not taking: Reported on 6/2/2022)      Zinc 50 MG CAPS Take by mouth (Patient not taking: Reported on 6/2/2022)       No current facility-administered medications for this visit       Current Outpatient Medications on File Prior to Visit   Medication Sig    [DISCONTINUED] amLODIPine (NORVASC) 5 mg tablet TAKE ONE TABLET BY MOUTH EVERY DAY    [DISCONTINUED] atorvastatin (LIPITOR) 80 mg tablet TAKE ONE TABLET BY MOUTH EVERY DAY    ascorbic acid (VITAMIN C) 250 mg tablet Take 1,500 mg by mouth daily  (Patient not taking: Reported on 6/2/2022)    aspirin (Aspirin Adult Low Dose) 81 mg EC tablet Take 1 tablet (81 mg total) by mouth daily (Patient not taking: Reported on 6/2/2022)    Biotin 1 MG CAPS Take by mouth (Patient not taking: Reported on 6/2/2022)    Diclofenac Sodium (VOLTAREN) 1 % diclofenac 1 % topical gel (Patient not taking: Reported on 6/2/2022)    ELDERBERRY PO Take by mouth (Patient not taking: Reported on 6/2/2022)    Magnesium 500 MG CAPS Take by mouth (Patient not taking: Reported on 6/2/2022)    Multiple Vitamin (multivitamin) tablet Take 1 tablet by mouth daily (Patient not taking: No sig reported)    nitroglycerin (NITROSTAT) 0 4 mg SL tablet PLACE 1 TABLET UNDER THE TONGUE EVERY 5 MINUTES UP TO 3 DOSES AS NEEDED IF NO RELIEF CALL 911 OR GO TO ER (Patient not taking: Reported on 6/2/2022)    Omega-3 Fatty Acids (fish oil) 1,000 mg Take 1,000 mg by mouth daily (Patient not taking: Reported on 6/2/2022)    Zinc 50 MG CAPS Take by mouth (Patient not taking: Reported on 6/2/2022)    [DISCONTINUED] ALPRAZolam (XANAX) 1 mg tablet TAKE ONE TABLET BY MOUTH THREE TIMES A DAY AS NEEDED FOR ANXIETY (Patient not taking: Reported on 6/2/2022)    [DISCONTINUED] metoprolol tartrate (LOPRESSOR) 50 mg tablet TAKE ONE TABLET BY MOUTH TWICE A DAY (Patient not taking: Reported on 6/2/2022)    [DISCONTINUED] omeprazole (PriLOSEC) 20 mg delayed release capsule Take 1 capsule (20 mg total) by mouth daily before breakfast (Patient not taking: Reported on 6/2/2022)     No current facility-administered medications on file prior to visit  She is allergic to morphine       Review of Systems   All other systems reviewed and are negative  Objective:      /70   Pulse 76   Temp 97 9 °F (36 6 °C)   Resp 18   Ht 5' 2" (1 575 m)   Wt 69 5 kg (153 lb 3 2 oz)   SpO2 97%   BMI 28 02 kg/m²          Physical Exam  Vitals and nursing note reviewed  Constitutional:       Appearance: Normal appearance  She is normal weight  Cardiovascular:      Rate and Rhythm: Normal rate and regular rhythm  Pulses: Normal pulses  Heart sounds: Normal heart sounds     Pulmonary:      Effort: Pulmonary effort is normal       Breath sounds: Normal breath sounds  Abdominal:      General: Abdomen is flat  Bowel sounds are normal       Palpations: Abdomen is soft  Musculoskeletal:         General: Normal range of motion  Cervical back: Normal range of motion and neck supple  Skin:     General: Skin is warm and dry  Capillary Refill: Capillary refill takes less than 2 seconds  Neurological:      General: No focal deficit present  Mental Status: She is alert and oriented to person, place, and time  Mental status is at baseline  Psychiatric:         Mood and Affect: Mood normal          Behavior: Behavior normal          Thought Content:  Thought content normal          Judgment: Judgment normal

## 2022-06-27 ENCOUNTER — APPOINTMENT (OUTPATIENT)
Dept: LAB | Facility: CLINIC | Age: 57
End: 2022-06-27
Payer: COMMERCIAL

## 2022-06-27 DIAGNOSIS — I25.10 CORONARY ARTERY DISEASE INVOLVING NATIVE CORONARY ARTERY OF NATIVE HEART WITHOUT ANGINA PECTORIS: ICD-10-CM

## 2022-06-27 DIAGNOSIS — J42 CHRONIC BRONCHITIS, UNSPECIFIED CHRONIC BRONCHITIS TYPE (HCC): ICD-10-CM

## 2022-06-27 DIAGNOSIS — J32.9 SINUSITIS, UNSPECIFIED CHRONICITY, UNSPECIFIED LOCATION: ICD-10-CM

## 2022-06-27 DIAGNOSIS — I10 ESSENTIAL HYPERTENSION: ICD-10-CM

## 2022-06-27 DIAGNOSIS — Z12.39 ENCOUNTER FOR SCREENING FOR MALIGNANT NEOPLASM OF BREAST, UNSPECIFIED SCREENING MODALITY: ICD-10-CM

## 2022-06-27 DIAGNOSIS — I20.8 ANGINA AT REST (HCC): ICD-10-CM

## 2022-06-27 DIAGNOSIS — E78.5 HYPERLIPIDEMIA, UNSPECIFIED HYPERLIPIDEMIA TYPE: ICD-10-CM

## 2022-06-27 DIAGNOSIS — Z12.4 SCREENING FOR CERVICAL CANCER: ICD-10-CM

## 2022-06-27 DIAGNOSIS — F41.9 ANXIETY: ICD-10-CM

## 2022-06-27 DIAGNOSIS — Z11.4 SCREENING FOR HIV (HUMAN IMMUNODEFICIENCY VIRUS): ICD-10-CM

## 2022-06-27 LAB
BASOPHILS # BLD AUTO: 0.07 THOUSANDS/ΜL (ref 0–0.1)
BASOPHILS NFR BLD AUTO: 1 % (ref 0–1)
EOSINOPHIL # BLD AUTO: 0.12 THOUSAND/ΜL (ref 0–0.61)
EOSINOPHIL NFR BLD AUTO: 2 % (ref 0–6)
ERYTHROCYTE [DISTWIDTH] IN BLOOD BY AUTOMATED COUNT: 12.6 % (ref 11.6–15.1)
HCT VFR BLD AUTO: 43.3 % (ref 34.8–46.1)
HGB BLD-MCNC: 14.6 G/DL (ref 11.5–15.4)
IMM GRANULOCYTES # BLD AUTO: 0.02 THOUSAND/UL (ref 0–0.2)
IMM GRANULOCYTES NFR BLD AUTO: 0 % (ref 0–2)
LYMPHOCYTES # BLD AUTO: 1.54 THOUSANDS/ΜL (ref 0.6–4.47)
LYMPHOCYTES NFR BLD AUTO: 23 % (ref 14–44)
MCH RBC QN AUTO: 31.3 PG (ref 26.8–34.3)
MCHC RBC AUTO-ENTMCNC: 33.7 G/DL (ref 31.4–37.4)
MCV RBC AUTO: 93 FL (ref 82–98)
MONOCYTES # BLD AUTO: 0.5 THOUSAND/ΜL (ref 0.17–1.22)
MONOCYTES NFR BLD AUTO: 8 % (ref 4–12)
NEUTROPHILS # BLD AUTO: 4.36 THOUSANDS/ΜL (ref 1.85–7.62)
NEUTS SEG NFR BLD AUTO: 66 % (ref 43–75)
NRBC BLD AUTO-RTO: 0 /100 WBCS
PLATELET # BLD AUTO: 287 THOUSANDS/UL (ref 149–390)
PMV BLD AUTO: 10.1 FL (ref 8.9–12.7)
RBC # BLD AUTO: 4.67 MILLION/UL (ref 3.81–5.12)
WBC # BLD AUTO: 6.61 THOUSAND/UL (ref 4.31–10.16)

## 2022-06-27 PROCEDURE — 80061 LIPID PANEL: CPT

## 2022-06-27 PROCEDURE — 80053 COMPREHEN METABOLIC PANEL: CPT

## 2022-06-27 PROCEDURE — 85025 COMPLETE CBC W/AUTO DIFF WBC: CPT

## 2022-06-27 PROCEDURE — 36415 COLL VENOUS BLD VENIPUNCTURE: CPT

## 2022-06-27 PROCEDURE — 87389 HIV-1 AG W/HIV-1&-2 AB AG IA: CPT

## 2022-06-28 LAB
ALBUMIN SERPL BCP-MCNC: 3.7 G/DL (ref 3.5–5)
ALP SERPL-CCNC: 102 U/L (ref 46–116)
ALT SERPL W P-5'-P-CCNC: 34 U/L (ref 12–78)
ANION GAP SERPL CALCULATED.3IONS-SCNC: 3 MMOL/L (ref 4–13)
AST SERPL W P-5'-P-CCNC: 21 U/L (ref 5–45)
BILIRUB SERPL-MCNC: 1.44 MG/DL (ref 0.2–1)
BUN SERPL-MCNC: 12 MG/DL (ref 5–25)
CALCIUM SERPL-MCNC: 9.4 MG/DL (ref 8.3–10.1)
CHLORIDE SERPL-SCNC: 107 MMOL/L (ref 100–108)
CHOLEST SERPL-MCNC: 133 MG/DL
CO2 SERPL-SCNC: 28 MMOL/L (ref 21–32)
CREAT SERPL-MCNC: 0.95 MG/DL (ref 0.6–1.3)
GFR SERPL CREATININE-BSD FRML MDRD: 68 ML/MIN/1.73SQ M
GLUCOSE P FAST SERPL-MCNC: 100 MG/DL (ref 65–99)
HDLC SERPL-MCNC: 58 MG/DL
HIV 1+2 AB+HIV1 P24 AG SERPL QL IA: NORMAL
LDLC SERPL CALC-MCNC: 57 MG/DL (ref 0–100)
NONHDLC SERPL-MCNC: 75 MG/DL
POTASSIUM SERPL-SCNC: 4.4 MMOL/L (ref 3.5–5.3)
PROT SERPL-MCNC: 7.2 G/DL (ref 6.4–8.2)
SODIUM SERPL-SCNC: 138 MMOL/L (ref 136–145)
TRIGL SERPL-MCNC: 90 MG/DL

## 2022-11-10 ENCOUNTER — OFFICE VISIT (OUTPATIENT)
Dept: FAMILY MEDICINE CLINIC | Facility: CLINIC | Age: 57
End: 2022-11-10

## 2022-11-10 VITALS
SYSTOLIC BLOOD PRESSURE: 118 MMHG | OXYGEN SATURATION: 97 % | DIASTOLIC BLOOD PRESSURE: 72 MMHG | WEIGHT: 153 LBS | RESPIRATION RATE: 16 BRPM | HEIGHT: 62 IN | BODY MASS INDEX: 28.16 KG/M2 | HEART RATE: 63 BPM

## 2022-11-10 DIAGNOSIS — I25.119 CORONARY ARTERY DISEASE INVOLVING NATIVE CORONARY ARTERY OF NATIVE HEART WITH ANGINA PECTORIS (HCC): ICD-10-CM

## 2022-11-10 DIAGNOSIS — I25.10 ASCVD (ARTERIOSCLEROTIC CARDIOVASCULAR DISEASE): ICD-10-CM

## 2022-11-10 DIAGNOSIS — Z12.4 SCREENING FOR CERVICAL CANCER: Primary | ICD-10-CM

## 2022-11-10 DIAGNOSIS — Z23 NEED FOR IMMUNIZATION AGAINST INFLUENZA: ICD-10-CM

## 2022-11-10 DIAGNOSIS — F51.01 PRIMARY INSOMNIA: ICD-10-CM

## 2022-11-10 DIAGNOSIS — J30.9 ALLERGIC RHINITIS, UNSPECIFIED SEASONALITY, UNSPECIFIED TRIGGER: ICD-10-CM

## 2022-11-10 DIAGNOSIS — I25.10 CORONARY ARTERY DISEASE INVOLVING NATIVE CORONARY ARTERY OF NATIVE HEART WITHOUT ANGINA PECTORIS: ICD-10-CM

## 2022-11-10 RX ORDER — NITROGLYCERIN 0.4 MG/1
0.4 TABLET SUBLINGUAL
Qty: 100 TABLET | Refills: 5 | Status: SHIPPED | OUTPATIENT
Start: 2022-11-10

## 2022-11-10 RX ORDER — TRAZODONE HYDROCHLORIDE 50 MG/1
25 TABLET ORAL
Qty: 90 TABLET | Refills: 5 | Status: SHIPPED | OUTPATIENT
Start: 2022-11-10

## 2022-11-10 RX ORDER — FLUTICASONE PROPIONATE 50 MCG
2 SPRAY, SUSPENSION (ML) NASAL DAILY
Qty: 16 G | Refills: 5 | Status: SHIPPED | OUTPATIENT
Start: 2022-11-10

## 2022-11-10 NOTE — PROGRESS NOTES
Assessment/Plan:    No problem-specific Assessment & Plan notes found for this encounter  Diagnoses and all orders for this visit:    Screening for cervical cancer    Need for immunization against influenza    Coronary artery disease involving native coronary artery of native heart without angina pectoris  -     nitroglycerin (NITROSTAT) 0 4 mg SL tablet; Place 1 tablet (0 4 mg total) under the tongue every 5 (five) minutes as needed for chest pain  -     Lipid panel; Future  -     Comprehensive metabolic panel; Future  -     TSH, 3rd generation with Free T4 reflex; Future  -     NM myocardial perfusion spect (rx stress and/or rest); Future    ASCVD (arteriosclerotic cardiovascular disease)  -     Ambulatory Referral to Cardiology; Future  -     Cancel: Echo stress test, exercise; Future  -     Lipid panel; Future  -     Comprehensive metabolic panel; Future  -     TSH, 3rd generation with Free T4 reflex; Future  -     NM myocardial perfusion spect (rx stress and/or rest); Future    Coronary artery disease involving native coronary artery of native heart with angina pectoris [facility])  -     Ambulatory Referral to Cardiology; Future  -     NM myocardial perfusion spect (rx stress and/or rest); Future    Primary insomnia  -     traZODone (DESYREL) 50 mg tablet; Take 0 5 tablets (25 mg total) by mouth daily at bedtime          Subjective:      Patient ID: Ju Andrews is a 56 y o  female  She is getting intermittent chest discomfort  It can occur at rest, but it is similar to the chest pain she experienced during her MI  She has some palpitations  She denies diaphoresis  She would like to re-establish with cardiology at [facility]  She cannot walk on a treadmill due to foot and ankle issues  She declines mammogram and colon cancer screening        The following portions of the patient's history were reviewed and updated as appropriate:   She  has a past medical history of Angina pectoris ([address] ), Anxiety, BMI 34 0-34 9,adult (3/29/2019), GERD (gastroesophageal reflux disease), Myocardial infarction [facility]), NSTEMI (non-ST elevated myocardial infarction) ([facility] ) (1/24/2016), and Shortness of breath  She   Patient Active Problem List    Diagnosis Date Noted   • Chronic bronchitis, unspecified chronic bronchitis type ([place] [address] ) 06/17/2021   • Dislocation of tarsometatarsal joint of right foot 06/17/2021   • Gastroesophageal reflux disease 05/20/2020   • Family history of colonic polyps 05/20/2020   • Coronary artery disease involving native coronary artery of native heart with angina pectoris ([place] [address] ) 04/25/2019   • Abnormal CT of the abdomen 01/15/2019   • Palpitations 07/13/2018   • Fatty food intolerance 06/25/2018   • Left foot pain 06/25/2018   • ASCVD (arteriosclerotic cardiovascular disease) 01/29/2016   • Status post non-ST elevation myocardial infarction (NSTEMI) 01/29/2016   • S/p bare metal coronary artery stent 01/26/2016   • HLD (hyperlipidemia) 01/24/2016   • Anxiety 02/12/2013   • Sciatica 09/08/2008   • Low back pain 04/14/2008     She  has a past surgical history that includes Toe Surgery; Tubal ligation; Coronary angioplasty with stent; Cardiac surgery; and Tubal ligation  Her family history includes Atrial fibrillation in her mother; Diabetes type II in her mother; Heart failure in her mother; Hypertension in her father and mother; Lung disease in her mother  She  reports that she has been smoking cigarettes  She has never used smokeless tobacco  She reports that she does not drink alcohol and does not use drugs    Current Outpatient Medications   Medication Sig Dispense Refill   • ALPRAZolam (XANAX) 1 mg tablet Take 1 tablet (1 mg total) by mouth 3 (three) times a day as needed for anxiety 270 tablet 3   • amLODIPine (NORVASC) 5 mg tablet Take 1 tablet (5 mg total) by mouth daily 90 tablet 3   • atorvastatin (LIPITOR) 80 mg tablet Take 1 tablet (80 mg total) by mouth daily 90 tablet 3   • metoprolol tartrate (LOPRESSOR) 50 mg tablet Take 1 tablet (50 mg total) by mouth 2 (two) times a day 180 tablet 3   • nitroglycerin (NITROSTAT) 0 4 mg SL tablet Place 1 tablet (0 4 mg total) under the tongue every 5 (five) minutes as needed for chest pain 100 tablet 5   • omeprazole (PriLOSEC) 20 mg delayed release capsule Take 1 capsule (20 mg total) by mouth daily before breakfast 30 capsule 5   • traZODone (DESYREL) 50 mg tablet Take 0 5 tablets (25 mg total) by mouth daily at bedtime 90 tablet 5   • Omega-3 Fatty Acids (fish oil) 1,000 mg Take 1,000 mg by mouth daily (Patient not taking: No sig reported)       No current facility-administered medications for this visit       Current Outpatient Medications on File Prior to Visit   Medication Sig   • ALPRAZolam (XANAX) 1 mg tablet Take 1 tablet (1 mg total) by mouth 3 (three) times a day as needed for anxiety   • amLODIPine (NORVASC) 5 mg tablet Take 1 tablet (5 mg total) by mouth daily   • atorvastatin (LIPITOR) 80 mg tablet Take 1 tablet (80 mg total) by mouth daily   • metoprolol tartrate (LOPRESSOR) 50 mg tablet Take 1 tablet (50 mg total) by mouth 2 (two) times a day   • omeprazole (PriLOSEC) 20 mg delayed release capsule Take 1 capsule (20 mg total) by mouth daily before breakfast   • Omega-3 Fatty Acids (fish oil) 1,000 mg Take 1,000 mg by mouth daily (Patient not taking: No sig reported)   • [DISCONTINUED] ascorbic acid (VITAMIN C) 250 mg tablet Take 1,500 mg by mouth daily  (Patient not taking: Reported on 6/2/2022)   • [DISCONTINUED] aspirin (Aspirin Adult Low Dose) 81 mg EC tablet Take 1 tablet (81 mg total) by mouth daily (Patient not taking: Reported on 6/2/2022)   • [DISCONTINUED] Biotin 1 MG CAPS Take by mouth (Patient not taking: No sig reported)   • [DISCONTINUED] Diclofenac Sodium (VOLTAREN) 1 % diclofenac 1 % topical gel (Patient not taking: No sig reported)   • [DISCONTINUED] ELDERBERRY PO Take by mouth (Patient not taking: No sig reported)   • [DISCONTINUED] Magnesium 500 MG CAPS Take by mouth (Patient not taking: No sig reported)   • [DISCONTINUED] Multiple Vitamin (multivitamin) tablet Take 1 tablet by mouth daily (Patient not taking: No sig reported)   • [DISCONTINUED] nitroglycerin (NITROSTAT) 0 4 mg SL tablet PLACE 1 TABLET UNDER THE TONGUE EVERY 5 MINUTES UP TO 3 DOSES AS NEEDED IF NO RELIEF CALL 911 OR GO TO ER (Patient not taking: No sig reported)   • [DISCONTINUED] Zinc 50 MG CAPS Take by mouth (Patient not taking: No sig reported)     No current facility-administered medications on file prior to visit  She is allergic to morphine       Review of Systems   Cardiovascular: Positive for chest pain and palpitations  All other systems reviewed and are negative  Objective:      /72   Pulse 63   Resp 16   Ht 5' 2" (1 575 m)   Wt 69 4 kg (153 lb)   SpO2 97%   BMI 27 98 kg/m²          Physical Exam  Vitals and nursing note reviewed  Constitutional:       Appearance: Normal appearance  Cardiovascular:      Rate and Rhythm: Normal rate and regular rhythm  Pulses: Normal pulses  Heart sounds: Normal heart sounds  Pulmonary:      Effort: Pulmonary effort is normal       Breath sounds: Normal breath sounds  Abdominal:      General: Abdomen is flat  Bowel sounds are normal       Palpations: Abdomen is soft  Musculoskeletal:         General: Normal range of motion  Cervical back: Normal range of motion and neck supple  Skin:     General: Skin is warm and dry  Capillary Refill: Capillary refill takes less than 2 seconds  Neurological:      General: No focal deficit present  Mental Status: She is alert and oriented to person, place, and time  Mental status is at baseline  Psychiatric:         Mood and Affect: Mood normal          Behavior: Behavior normal          Thought Content:  Thought content normal          Judgment: Judgment normal

## 2022-11-10 NOTE — PROGRESS NOTES
Assessment and Plan:     Problem List Items Addressed This Visit        Cardiovascular and Mediastinum    ASCVD (arteriosclerotic cardiovascular disease)    Relevant Orders    Ambulatory Referral to Cardiology    Echo stress test, exercise    Lipid panel    Comprehensive metabolic panel    TSH, 3rd generation with Free T4 reflex    Coronary artery disease involving native coronary artery of native heart with angina pectoris (HCC)    Relevant Medications    nitroglycerin (NITROSTAT) 0 4 mg SL tablet    Other Relevant Orders    Ambulatory Referral to Cardiology      Other Visit Diagnoses     Screening for cervical cancer    -  Primary    Need for immunization against influenza        Coronary artery disease involving native coronary artery of native heart without angina pectoris        Relevant Medications    nitroglycerin (NITROSTAT) 0 4 mg SL tablet    Other Relevant Orders    Lipid panel    Comprehensive metabolic panel    TSH, 3rd generation with Free T4 reflex    Primary insomnia        Relevant Medications    traZODone (DESYREL) 50 mg tablet           Preventive health issues were discussed with patient, and age appropriate screening tests were ordered as noted in patient's After Visit Summary  Personalized health advice and appropriate referrals for health education or preventive services given if needed, as noted in patient's After Visit Summary  History of Present Illness:     Patient presents for a Medicare Wellness Visit    HPI   Patient Care Team:  Susan Da Silva MD as PCP - General (Family Medicine)  Susan Da Silva MD as PCP - [address] [place] [address] (RTE)  [facility]  Kaylah Lim DO (Cardiology)  Celio Anderson MD (Cardiology)     Review of Systems:     Review of Systems   All other systems reviewed and are negative         Problem List:     Patient Active Problem List   Diagnosis   • Anxiety   • ASCVD (arteriosclerotic cardiovascular disease)   • HLD (hyperlipidemia)   • S/p bare metal coronary artery stent   • Status post non-ST elevation myocardial infarction (NSTEMI)   • Fatty food intolerance   • Left foot pain   • Palpitations   • Abnormal CT of the abdomen   • Coronary artery disease involving native coronary artery of native heart with angina pectoris (HCC)   • Low back pain   • Sciatica   • Gastroesophageal reflux disease   • Family history of colonic polyps   • Chronic bronchitis, unspecified chronic bronchitis type (HCC)   • Dislocation of tarsometatarsal joint of right foot      Past Medical and Surgical History:     Past Medical History:   Diagnosis Date   • Angina pectoris ([facility] )    • Anxiety    • BMI 34 0-34 9,adult 3/29/2019   • GERD (gastroesophageal reflux disease)    • Myocardial infarction ([facility] )     1/24/16   • NSTEMI (non-ST elevated myocardial infarction) ([facility] ) 1/24/2016    Overview:  Max trop I 1 77([facility]) w/nml LVEF and wall motion      • Shortness of breath      Past Surgical History:   Procedure Laterality Date   • CARDIAC SURGERY     • CORONARY ANGIOPLASTY WITH STENT PLACEMENT     • TOE SURGERY     • TUBAL LIGATION     • TUBAL LIGATION        Family History:     Family History   Problem Relation Age of Onset   • Atrial fibrillation Mother    • Heart failure Mother    • Diabetes type II Mother    • Hypertension Mother    • Lung disease Mother         Pleural Effusion   • Hypertension Father       Social History:     Social History     Socioeconomic History   • Marital status:      Spouse name: None   • Number of children: None   • Years of education: None   • Highest education level: None   Occupational History   • None   Tobacco Use   • Smoking status: Current Every Day Smoker     Types: Cigarettes   • Smokeless tobacco: Never Used   • Tobacco comment: 1 cig daily    Vaping Use   • Vaping Use: Never used   Substance and Sexual Activity   • Alcohol use: No   • Drug use: No   • Sexual activity: None   Other Topics Concern   • None Social History Narrative   • None     Social Determinants of Health     Financial Resource Strain: Medium Risk   • Difficulty of Paying Living Expenses: Somewhat hard   Food Insecurity: Not on file   Transportation Needs: No Transportation Needs   • Lack of Transportation (Medical): No   • Lack of Transportation (Non-Medical): No   Physical Activity: Not on file   Stress: Not on file   Social Connections: Not on file   Intimate Partner Violence: Not on file   Housing Stability: Not on file      Medications and Allergies:     Current Outpatient Medications   Medication Sig Dispense Refill   • ALPRAZolam (XANAX) 1 mg tablet Take 1 tablet (1 mg total) by mouth 3 (three) times a day as needed for anxiety 270 tablet 3   • amLODIPine (NORVASC) 5 mg tablet Take 1 tablet (5 mg total) by mouth daily 90 tablet 3   • atorvastatin (LIPITOR) 80 mg tablet Take 1 tablet (80 mg total) by mouth daily 90 tablet 3   • metoprolol tartrate (LOPRESSOR) 50 mg tablet Take 1 tablet (50 mg total) by mouth 2 (two) times a day 180 tablet 3   • nitroglycerin (NITROSTAT) 0 4 mg SL tablet Place 1 tablet (0 4 mg total) under the tongue every 5 (five) minutes as needed for chest pain 100 tablet 5   • omeprazole (PriLOSEC) 20 mg delayed release capsule Take 1 capsule (20 mg total) by mouth daily before breakfast 30 capsule 5   • traZODone (DESYREL) 50 mg tablet Take 0 5 tablets (25 mg total) by mouth daily at bedtime 90 tablet 5   • Omega-3 Fatty Acids (fish oil) 1,000 mg Take 1,000 mg by mouth daily (Patient not taking: No sig reported)       No current facility-administered medications for this visit       Allergies   Allergen Reactions   • Morphine Confusion and Delirium      Immunizations:     Immunization History   Administered Date(s) Administered   • COVID-19 PFIZER VACCINE 0 3 ML IM 02/28/2021, 03/27/2021   • INFLUENZA 08/17/2016, 08/02/2017, 08/23/2018, 08/18/2022   • Influenza, injectable, quadrivalent, preservative free 0 5 mL 08/23/2018 • Influenza, recombinant, quadrivalent,injectable, preservative free 08/15/2020   • Pneumococcal Polysaccharide PPV23 06/03/2020   • Zoster Vaccine Recombinant 09/15/2020      Health Maintenance:         Topic Date Due   • Cervical Cancer Screening  Never done   • Breast Cancer Screening: Mammogram  Never done   • Colorectal Cancer Screening  08/16/2029   • HIV Screening  Completed   • Hepatitis C Screening  Completed         Topic Date Due   • COVID-19 Vaccine (3 - Booster for Velez Jovanni series) 08/27/2021      Medicare Screening Tests and Risk Assessments:     Last Medicare Wellness visit information reviewed, patient interviewed and updates made to the record today  Health Risk Assessment:   Patient rates overall health as good  Patient feels that their physical health rating is same  Patient is satisfied with their life  Eyesight was rated as same  Hearing was rated as same  Patient feels that their emotional and mental health rating is same  Patients states they are sometimes angry  Patient states they are often unusually tired/fatigued  Pain experienced in the last 7 days has been some  Patient states that she has experienced no weight loss or gain in last 6 months  Fall Risk Screening: In the past year, patient has experienced: no history of falling in past year      Urinary Incontinence Screening:   Patient has not leaked urine accidently in the last six months  Home Safety:  Patient does not have trouble with stairs inside or outside of their home  Patient has working smoke alarms and has working carbon monoxide detector  Home safety hazards include: none  Nutrition:   Current diet is No Added Salt and Limited junk food  Medications:   Patient is currently taking over-the-counter supplements  OTC medications include: see medication list  Patient is able to manage medications       Activities of Daily Living (ADLs)/Instrumental Activities of Daily Living (IADLs):   Walk and transfer into and out of bed and chair?: Yes  Dress and groom yourself?: Yes    Bathe or shower yourself?: Yes    Feed yourself? Yes  Do your laundry/housekeeping?: Yes  Manage your money, pay your bills and track your expenses?: Yes  Make your own meals?: Yes    Do your own shopping?: Yes    Previous Hospitalizations:   Any hospitalizations or ED visits within the last 12 months?: No      Advance Care Planning:   Living will: Yes    Durable POA for healthcare:  Yes    Advanced directive: Yes      Cognitive Screening:   Provider or family/friend/caregiver concerned regarding cognition?: No    PREVENTIVE SCREENINGS      Cardiovascular Screening:    General: Screening Not Indicated and History Lipid Disorder      Diabetes Screening:     General: Screening Current      Colorectal Cancer Screening:     General: Screening Current      Breast Cancer Screening:       Due for: Mammogram        Cervical Cancer Screening:    General: Risks and Benefits Discussed      Osteoporosis Screening:    General: Risks and Benefits Discussed      Abdominal Aortic Aneurysm (AAA) Screening:        General: Screening Not Indicated      Lung Cancer Screening:     General: Screening Not Indicated and Risks and Benefits Discussed      Hepatitis C Screening:    General: Screening Current    Screening, Brief Intervention, and Referral to Treatment (SBIRT)    Screening  Typical number of drinks in a day: 0    Single Item Drug Screening:  How often have you used an illegal drug (including marijuana) or a prescription medication for non-medical reasons in the past year? never    Single Item Drug Screen Score: 0  Interpretation: Negative screen for possible drug use disorder    Review of Current Opioid Use    Opioid Risk Tool (ORT) Interpretation: Complete Opioid Risk Tool (ORT)    No exam data present     Physical Exam:     /72   Pulse 63   Resp 16   Ht 5' 2" (1 575 m)   Wt 69 4 kg (153 lb)   SpO2 97%   BMI 27 98 kg/m²     Physical Exam  Vitals and nursing note reviewed  Constitutional:       Appearance: Normal appearance  Cardiovascular:      Rate and Rhythm: Normal rate and regular rhythm  Pulses: Normal pulses  Heart sounds: Normal heart sounds  Pulmonary:      Effort: Pulmonary effort is normal       Breath sounds: Normal breath sounds  Abdominal:      General: Abdomen is flat  Bowel sounds are normal       Palpations: Abdomen is soft  Musculoskeletal:         General: Normal range of motion  Cervical back: Normal range of motion and neck supple  Skin:     General: Skin is warm and dry  Capillary Refill: Capillary refill takes less than 2 seconds  Neurological:      General: No focal deficit present  Mental Status: She is alert and oriented to person, place, and time  Mental status is at baseline  Psychiatric:         Mood and Affect: Mood normal          Behavior: Behavior normal          Thought Content:  Thought content normal          Judgment: Judgment normal           Harpal Melendez MD

## 2022-11-10 NOTE — PATIENT INSTRUCTIONS
Medicare Preventive Visit Patient Instructions  Thank you for completing your Welcome to Medicare Visit or Medicare Annual Wellness Visit today  Your next wellness visit will be due in one year (11/11/2023)  The screening/preventive services that you may require over the next 5-10 years are detailed below  Some tests may not apply to you based off risk factors and/or age  Screening tests ordered at today's visit but not completed yet may show as past due  Also, please note that scanned in results may not display below  Preventive Screenings:  Service Recommendations Previous Testing/Comments   Colorectal Cancer Screening  * Colonoscopy    * Fecal Occult Blood Test (FOBT)/Fecal Immunochemical Test (FIT)  * Fecal DNA/Cologuard Test  * Flexible Sigmoidoscopy Age: 39-70 years old   Colonoscopy: every 10 years (may be performed more frequently if at higher risk)  OR  FOBT/FIT: every 1 year  OR  Cologuard: every 3 years  OR  Sigmoidoscopy: every 5 years  Screening may be recommended earlier than age 39 if at higher risk for colorectal cancer  Also, an individualized decision between you and your healthcare provider will decide whether screening between the ages of 74-80 would be appropriate  Colonoscopy: 08/16/2019  FOBT/FIT: Not on file  Cologuard: Not on file  Sigmoidoscopy: Not on file    Screening Current     Breast Cancer Screening Age: 36 years old  Frequency: every 1-2 years  Not required if history of left and right mastectomy Mammogram: Not on file        Cervical Cancer Screening Between the ages of 21-29, pap smear recommended once every 3 years  Between the ages of 33-67, can perform pap smear with HPV co-testing every 5 years     Recommendations may differ for women with a history of total hysterectomy, cervical cancer, or abnormal pap smears in past  Pap Smear: Not on file        Hepatitis C Screening Once for adults born between 1945 and 1965  More frequently in patients at high risk for Hepatitis C Hep C Antibody: 06/18/2021    Screening Current   Diabetes Screening 1-2 times per year if you're at risk for diabetes or have pre-diabetes Fasting glucose: 100 mg/dL (6/27/2022)  A1C: No results in last 5 years (No results in last 5 years)  Screening Current   Cholesterol Screening Once every 5 years if you don't have a lipid disorder  May order more often based on risk factors  Lipid panel: 06/27/2022    Screening Not Indicated  History Lipid Disorder     Other Preventive Screenings Covered by Medicare:  1  Abdominal Aortic Aneurysm (AAA) Screening: covered once if your at risk  You're considered to be at risk if you have a family history of AAA  2  Lung Cancer Screening: covers low dose CT scan once per year if you meet all of the following conditions: (1) Age 50-69; (2) No signs or symptoms of lung cancer; (3) Current smoker or have quit smoking within the last 15 years; (4) You have a tobacco smoking history of at least 20 pack years (packs per day multiplied by number of years you smoked); (5) You get a written order from a healthcare provider  3  Glaucoma Screening: covered annually if you're considered high risk: (1) You have diabetes OR (2) Family history of glaucoma OR (3)  aged 48 and older OR (3)  American aged 72 and older  3  Osteoporosis Screening: covered every 2 years if you meet one of the following conditions: (1) You're estrogen deficient and at risk for osteoporosis based off medical history and other findings; (2) Have a vertebral abnormality; (3) On glucocorticoid therapy for more than 3 months; (4) Have primary hyperparathyroidism; (5) On osteoporosis medications and need to assess response to drug therapy  · Last bone density test (DXA Scan): Not on file  5  HIV Screening: covered annually if you're between the age of 12-76  Also covered annually if you are younger than 13 and older than 72 with risk factors for HIV infection   For pregnant patients, it is covered up to 3 times per pregnancy  Immunizations:  Immunization Recommendations   Influenza Vaccine Annual influenza vaccination during flu season is recommended for all persons aged >= 6 months who do not have contraindications   Pneumococcal Vaccine   * Pneumococcal conjugate vaccine = PCV13 (Prevnar 13), PCV15 (Vaxneuvance), PCV20 (Prevnar 20)  * Pneumococcal polysaccharide vaccine = PPSV23 (Pneumovax) Adults 25-60 years old: 1-3 doses may be recommended based on certain risk factors  Adults 72 years old: 1-2 doses may be recommended based off what pneumonia vaccine you previously received   Hepatitis B Vaccine 3 dose series if at intermediate or high risk (ex: diabetes, end stage renal disease, liver disease)   Tetanus (Td) Vaccine - COST NOT COVERED BY MEDICARE PART B Following completion of primary series, a booster dose should be given every 10 years to maintain immunity against tetanus  Td may also be given as tetanus wound prophylaxis  Tdap Vaccine - COST NOT COVERED BY MEDICARE PART B Recommended at least once for all adults  For pregnant patients, recommended with each pregnancy  Shingles Vaccine (Shingrix) - COST NOT COVERED BY MEDICARE PART B  2 shot series recommended in those aged 48 and above     Health Maintenance Due:      Topic Date Due   • Cervical Cancer Screening  Never done   • Breast Cancer Screening: Mammogram  Never done   • Colorectal Cancer Screening  08/16/2029   • HIV Screening  Completed   • Hepatitis C Screening  Completed     Immunizations Due:      Topic Date Due   • COVID-19 Vaccine (3 - Booster for Velez Peter series) 08/27/2021     Advance Directives   What are advance directives? Advance directives are legal documents that state your wishes and plans for medical care  These plans are made ahead of time in case you lose your ability to make decisions for yourself   Advance directives can apply to any medical decision, such as the treatments you want, and if you want to donate organs  What are the types of advance directives? There are many types of advance directives, and each state has rules about how to use them  You may choose a combination of any of the following:  · Living will: This is a written record of the treatment you want  You can also choose which treatments you do not want, which to limit, and which to stop at a certain time  This includes surgery, medicine, IV fluid, and tube feedings  · Durable power of  for healthcare [facility]): This is a written record that states who you want to make healthcare choices for you when you are unable to make them for yourself  This person, called a proxy, is usually a family member or a friend  You may choose more than 1 proxy  · Do not resuscitate (DNR) order:  A DNR order is used in case your heart stops beating or you stop breathing  It is a request not to have certain forms of treatment, such as CPR  A DNR order may be included in other types of advance directives  · Medical directive: This covers the care that you want if you are in a coma, near death, or unable to make decisions for yourself  You can list the treatments you want for each condition  Treatment may include pain medicine, surgery, blood transfusions, dialysis, IV or tube feedings, and a ventilator (breathing machine)  · Values history: This document has questions about your views, beliefs, and how you feel and think about life  This information can help others choose the care that you would choose  Why are advance directives important? An advance directive helps you control your care  Although spoken wishes may be used, it is better to have your wishes written down  Spoken wishes can be misunderstood, or not followed  Treatments may be given even if you do not want them  An advance directive may make it easier for your family to make difficult choices about your care     Cigarette Smoking and Your Health   Risks to your health if you smoke:  Nicotine and other chemicals found in tobacco damage every cell in your body  Even if you are a light smoker, you have an increased risk for cancer, heart disease, and lung disease  If you are pregnant or have diabetes, smoking increases your risk for complications  Benefits to your health if you stop smoking:   · You decrease respiratory symptoms such as coughing, wheezing, and shortness of breath  · You reduce your risk for cancers of the lung, mouth, throat, kidney, bladder, pancreas, stomach, and cervix  If you already have cancer, you increase the benefits of chemotherapy  You also reduce your risk for cancer returning or a second cancer from developing  · You reduce your risk for heart disease, blood clots, heart attack, and stroke  · You reduce your risk for lung infections, and diseases such as pneumonia, asthma, chronic bronchitis, and emphysema  · Your circulation improves  More oxygen can be delivered to your body  If you have diabetes, you lower your risk for complications, such as kidney, artery, and eye diseases  You also lower your risk for nerve damage  Nerve damage can lead to amputations, poor vision, and blindness  · You improve your body's ability to heal and to fight infections  For more information and support to stop smoking:   · [employer]  Phone: 2- 796 - 383-6439  Web Address: www Fever  Weight Management   Why it is important to manage your weight:  Being overweight increases your risk of health conditions such as heart disease, high blood pressure, type 2 diabetes, and certain types of cancer  It can also increase your risk for osteoarthritis, sleep apnea, and other respiratory problems  Aim for a slow, steady weight loss  Even a small amount of weight loss can lower your risk of health problems  How to lose weight safely:  A safe and healthy way to lose weight is to eat fewer calories and get regular exercise   You can lose up about 1 pound a week by decreasing the number of calories you eat by 500 calories each day  Healthy meal plan for weight management:  A healthy meal plan includes a variety of foods, contains fewer calories, and helps you stay healthy  A healthy meal plan includes the following:  · Eat whole-grain foods more often  A healthy meal plan should contain fiber  Fiber is the part of grains, fruits, and vegetables that is not broken down by your body  Whole-grain foods are healthy and provide extra fiber in your diet  Some examples of whole-grain foods are whole-wheat breads and pastas, oatmeal, brown rice, and bulgur  · Eat a variety of vegetables every day  Include dark, leafy greens such as spinach, kale, alissa greens, and mustard greens  Eat yellow and orange vegetables such as carrots, sweet potatoes, and winter squash  · Eat a variety of fruits every day  Choose fresh or canned fruit (canned in its own juice or light syrup) instead of juice  Fruit juice has very little or no fiber  · Eat low-fat dairy foods  Drink fat-free (skim) milk or 1% milk  Eat fat-free yogurt and low-fat cottage cheese  Try low-fat cheeses such as mozzarella and other reduced-fat cheeses  · Choose meat and other protein foods that are low in fat  Choose beans or other legumes such as split peas or lentils  Choose fish, skinless poultry (chicken or turkey), or lean cuts of red meat (beef or pork)  Before you cook meat or poultry, cut off any visible fat  · Use less fat and oil  Try baking foods instead of frying them  Add less fat, such as margarine, sour cream, regular salad dressing and mayonnaise to foods  Eat fewer high-fat foods  Some examples of high-fat foods include french fries, doughnuts, ice cream, and cakes  · Eat fewer sweets  Limit foods and drinks that are high in sugar  This includes candy, cookies, regular soda, and sweetened drinks  Exercise:  Exercise at least 30 minutes per day on most days of the week   Some examples of exercise include walking, biking, dancing, and swimming  You can also fit in more physical activity by taking the stairs instead of the elevator or parking farther away from stores  Ask your healthcare provider about the best exercise plan for you  Narcotic (Opioid) Safety    Use narcotics safely:  · Take prescribed narcotics exactly as directed  · Do not give narcotics to others or take narcotics that belong to someone else  · Do not mix narcotics without medicines or alcohol  · Do not drive or operate heavy machinery after you take the narcotic  · Monitor for side effects and notify your healthcare provider if you experienced side effects such as nausea, sleepiness, itching, or trouble thinking clearly  Manage constipation:    Constipation is the most common side effect of narcotic medicine  Constipation is when you have hard, dry bowel movements, or you go longer than usual between bowel movements  Tell your healthcare provider about all changes in your bowel movements while you are taking narcotics  He or she may recommend laxative medicine to help you have a bowel movement  He or she may also change the kind of narcotic you are taking, or change when you take it  The following are more ways you can prevent or relieve constipation:    · Drink liquids as directed  You may need to drink extra liquids to help soften and move your bowels  Ask how much liquid to drink each day and which liquids are best for you  · Eat high-fiber foods  This may help decrease constipation by adding bulk to your bowel movements  High-fiber foods include fruits, vegetables, whole-grain breads and cereals, and beans  Your healthcare provider or dietitian can help you create a high-fiber meal plan  Your provider may also recommend a fiber supplement if you cannot get enough fiber from food  · Exercise regularly  Regular physical activity can help stimulate your intestines  Walking is a good exercise to prevent or relieve constipation   Ask which exercises are best for you  · Schedule a time each day to have a bowel movement  This may help train your body to have regular bowel movements  Bend forward while you are on the toilet to help move the bowel movement out  Sit on the toilet for at least 10 minutes, even if you do not have a bowel movement  Store narcotics safely:   · Store narcotics where others cannot easily get them  Keep them in a locked cabinet or secure area  Do not  keep them in a purse or other bag you carry with you  A person may be looking for something else and find the narcotics  · Make sure narcotics are stored out of the reach of children  A child can easily overdose on narcotics  Narcotics may look like candy to a small child  The best way to dispose of narcotics: The laws vary by country and area  In the [place], the best way is to return the narcotics through a take-back program  This program is offered by the [employer])  The following are options for using the program:  · Take the narcotics to a MADY collection site  The site is often a law enforcement center  Call your local law enforcement center for scheduled take-back days in your area  You will be given information on where to go if the collection site is in a different location  · Take the narcotics to an approved pharmacy or hospital   A pharmacy or hospital may be set up as a collection site  You will need to ask if it is a MADY collection site if you were not directed there  A pharmacy or doctor's office may not be able to take back narcotics unless it is a MADY site  · Use a mail-back system  This means you are given containers to put the narcotics into  You will then mail them in the containers  · Use a take-back drop box  This is a place to leave the narcotics at any time  People and animals will not be able to get into the box  Your local law enforcement agency can tell you where to find a drop box in your area      Other ways to manage pain:   · Ask your healthcare provider about non-narcotic medicines to control pain  Nonprescription medicines include NSAIDs (such as ibuprofen) and acetaminophen  Prescription medicines include muscle relaxers, antidepressants, and steroids  · Pain may be managed without any medicines  Some ways to relieve pain include massage, aromatherapy, or meditation  Physical or occupational therapy may also help  For more information:   · Drug Enforcement Administration  [address] [facility] [address]  Phone: 2- 761 - 090-2714  Web Address: [facility]/drug_disposal/    · [address] Drug Administration  [place] Shannan Bonner , [address] [facility]  Phone: 6- 669 - 836-2130  Web Address: http://[employer]/     © Copyright [employer] 2018 Information is for End User's use only and may not be sold, redistributed or otherwise used for commercial purposes   All illustrations and images included in CareNotes® are the copyrighted property of A D A M , Inc  or [address] [place] [address]

## 2022-11-18 ENCOUNTER — TELEPHONE (OUTPATIENT)
Dept: CARDIOLOGY CLINIC | Facility: HOSPITAL | Age: 57
End: 2022-11-18

## 2022-11-18 NOTE — TELEPHONE ENCOUNTER
Pt called in asking if she is to take the her new medications before coming in for her next appointment  Please give pt a call to discuss

## 2022-11-23 ENCOUNTER — APPOINTMENT (OUTPATIENT)
Dept: LAB | Facility: HOSPITAL | Age: 57
End: 2022-11-23
Attending: FAMILY MEDICINE

## 2022-11-23 ENCOUNTER — HOSPITAL ENCOUNTER (OUTPATIENT)
Dept: NUCLEAR MEDICINE | Facility: HOSPITAL | Age: 57
Discharge: HOME | End: 2022-11-23
Attending: FAMILY MEDICINE

## 2022-11-23 DIAGNOSIS — I25.10 ASCVD (ARTERIOSCLEROTIC CARDIOVASCULAR DISEASE): ICD-10-CM

## 2022-11-23 DIAGNOSIS — I25.10 CORONARY ARTERY DISEASE INVOLVING NATIVE CORONARY ARTERY OF NATIVE HEART WITHOUT ANGINA PECTORIS: ICD-10-CM

## 2022-11-23 DIAGNOSIS — I25.119 CORONARY ARTERY DISEASE INVOLVING NATIVE CORONARY ARTERY OF NATIVE HEART WITH ANGINA PECTORIS (HCC): ICD-10-CM

## 2022-11-23 LAB
ALBUMIN SERPL BCP-MCNC: 4.3 G/DL (ref 3.5–5)
ALP SERPL-CCNC: 106 U/L (ref 46–116)
ALT SERPL W P-5'-P-CCNC: 25 U/L (ref 12–78)
ANION GAP SERPL CALCULATED.3IONS-SCNC: 9 MMOL/L (ref 4–13)
AST SERPL W P-5'-P-CCNC: 17 U/L (ref 5–45)
BASELINE ST DEPRESSION: 0 MM
BILIRUB SERPL-MCNC: 0.77 MG/DL (ref 0.2–1)
BUN SERPL-MCNC: 11 MG/DL (ref 5–25)
CALCIUM SERPL-MCNC: 10 MG/DL (ref 8.3–10.1)
CHLORIDE SERPL-SCNC: 109 MMOL/L (ref 96–108)
CHOLEST SERPL-MCNC: 109 MG/DL
CO2 SERPL-SCNC: 21 MMOL/L (ref 21–32)
CREAT SERPL-MCNC: 0.82 MG/DL (ref 0.6–1.3)
GFR SERPL CREATININE-BSD FRML MDRD: 80 ML/MIN/1.73SQ M
GLUCOSE P FAST SERPL-MCNC: 99 MG/DL (ref 65–99)
HDLC SERPL-MCNC: 55 MG/DL
LDLC SERPL CALC-MCNC: 35 MG/DL (ref 0–100)
MAX HR: 113 BPM
NONHDLC SERPL-MCNC: 54 MG/DL
NUC STRESS EJECTION FRACTION: 75 %
POTASSIUM SERPL-SCNC: 3.8 MMOL/L (ref 3.5–5.3)
PROT SERPL-MCNC: 8.2 G/DL (ref 6.4–8.4)
RATE PRESSURE PRODUCT: NORMAL
SL CV REST NUCLEAR ISOTOPE DOSE: 8.6 MCI
SL CV STRESS NUCLEAR ISOTOPE DOSE: 25.9 MCI
SL CV STRESS RECOVERY BP: NORMAL MMHG
SL CV STRESS RECOVERY HR: 84 BPM
SL CV STRESS RECOVERY O2 SAT: 98 %
SODIUM SERPL-SCNC: 139 MMOL/L (ref 135–147)
STRESS BASELINE BP: NORMAL MMHG
STRESS BASELINE HR: 71 BPM
STRESS O2 SAT REST: 98 %
STRESS PEAK HR: 113 BPM
STRESS POST O2 SAT PEAK: 99 %
STRESS POST PEAK BP: 116 MMHG
TRIGL SERPL-MCNC: 95 MG/DL
TSH SERPL DL<=0.05 MIU/L-ACNC: 1.33 UIU/ML (ref 0.45–4.5)

## 2022-11-23 RX ADMIN — REGADENOSON 0.4 MG: 0.08 INJECTION, SOLUTION INTRAVENOUS at 09:15

## 2022-11-30 NOTE — PROGRESS NOTES
Cardiology Follow Up    Rossana Acosta  1965  [id number]  [address]  [address]  [phone]    1  Coronary artery disease involving native coronary artery of native heart without angina pectoris  POCT ECG      2  S/p bare metal coronary artery stent        3  Essential hypertension        4  Dyslipidemia        5  Tobacco abuse        6  ASCVD (arteriosclerotic cardiovascular disease)  Ambulatory Referral to Cardiology      7  Coronary artery disease involving native coronary artery of native heart with angina pectoris [facility])  Ambulatory Referral to Cardiology          Discussion/Summary:  Coronary artery disease:  S/p bare metal stent to D1 - 2016  In 2019 cardiac cath showed patent stent and mild residual disease involving the LAD, circumflex, and RCA  She currently has no anginal symptoms  Recent nuclear stress test was negative for myocardial ischemia  Encouraged continued exercise  She remains on aspirin, statin, beta-blocker therapy    Hypertension:  Well controlled  Continue present regimen    Dyslipidemia:  Lipids are very well controlled with most recent LDL 35  Continue statin therapy    Palpitations:  Occasional and brief and not bothersome to patient  Advised her to call with any worsening symptoms and a holter/zio can be placed    Congratulated patient on smoking cessation  Encouraged her to eventually stop vaping as well  She will RTO in 6-8 months with Dr Cynthia Vicente or sooner if necessary  She will call with any concerns  Interval History:   Rossana Acosta is a 56 y o  female with coronary artery disease s/p prior bare metal stent to D1 with last catheterization in 2019 showing patent diagonal stent with mild circumflex, LAD, and RCA disease, hypertension, dyslipidemia, tobacco abuse who presents to the office today for follow up      She underwent two nuclear stress tests since her last visit 1 year ago, most recently last week  This did not show any evidence of myocardial ischemia  Patient states she overall has been feeling well  She reports she has been participating in exercises including weight lifting, a stationary bicycle, and yoga  She states she has lost 50 lbs  She denies any exertional chest pain/pressure/discomfort or shortness of breath  She has noticed occasional palpitations which are brief, lasting only a few seconds at a time  She denies lower extremity edema  She denies lightheadedness, dizziness, syncope  She stopped smoking with the use of a vape pen  She reports vaping very little and plans to quit all together  Medical Problems     Problem List     Anxiety    ASCVD (arteriosclerotic cardiovascular disease)    Overview Signed 1/15/2019  7:53 AM by Marshal Kent MD     Atypical chest pain         HLD (hyperlipidemia)    S/p bare metal coronary artery stent    Overview Signed 4/27/2018  8:34 AM by Marshal Kent MD     Overview:   2 0 x 15mm bare metal stent ([employer]) 1/25/2016 for severe diag lesion and NSTEMI w/preceeding rest angina  [facility]            Status post non-ST elevation myocardial infarction (NSTEMI)    Fatty food intolerance    Overview Deleted 6/21/2019  8:32 AM by Marshal Kent MD            Left foot pain    Palpitations    Abnormal CT of the abdomen    Coronary artery disease involving native coronary artery of native heart with angina pectoris ([facility] )    Overview Signed 4/30/2019 11:00 AM by PITO Dalal     Mid Circumflex stenosis 30%         Low back pain    Sciatica    Gastroesophageal reflux disease    Family history of colonic polyps    Chronic bronchitis, unspecified chronic bronchitis type (HCC)    Dislocation of tarsometatarsal joint of right foot        Past Medical History:   Diagnosis Date   • Angina pectoris ([facility] )    • Anxiety    • BMI 34 0-34 9,adult 3/29/2019   • GERD (gastroesophageal reflux disease)    • Myocardial infarction ([facility] )     1/24/16   • NSTEMI (non-ST elevated myocardial infarction) ([facility] ) 1/24/2016    Overview:  Max trop I 1 77([facility]) w/nml LVEF and wall motion  • Shortness of breath      Social History     Socioeconomic History   • Marital status:      Spouse name: Not on file   • Number of children: Not on file   • Years of education: Not on file   • Highest education level: Not on file   Occupational History   • Not on file   Tobacco Use   • Smoking status: Former     Types: Cigarettes   • Smokeless tobacco: Never   Vaping Use   • Vaping Use: Every day   • Substances: Nicotine, Flavoring   Substance and Sexual Activity   • Alcohol use: No   • Drug use: No   • Sexual activity: Not on file   Other Topics Concern   • Not on file   Social History Narrative   • Not on file     Social Determinants of Health     Financial Resource Strain: Medium Risk   • Difficulty of Paying Living Expenses: Somewhat hard   Food Insecurity: Not on file   Transportation Needs: No Transportation Needs   • Lack of Transportation (Medical): No   • Lack of Transportation (Non-Medical):  No   Physical Activity: Not on file   Stress: Not on file   Social Connections: Not on file   Intimate Partner Violence: Not on file   Housing Stability: Not on file      Family History   Problem Relation Age of Onset   • Atrial fibrillation Mother    • Heart failure Mother    • Diabetes type II Mother    • Hypertension Mother    • Lung disease Mother         Pleural Effusion   • Hypertension Father      Past Surgical History:   Procedure Laterality Date   • CARDIAC SURGERY     • CORONARY ANGIOPLASTY WITH STENT PLACEMENT     • TOE SURGERY     • TUBAL LIGATION     • TUBAL LIGATION         Current Outpatient Medications:   •  ALPRAZolam (XANAX) 1 mg tablet, Take 1 tablet (1 mg total) by mouth 3 (three) times a day as needed for anxiety, Disp: 270 tablet, Rfl: 3  •  amLODIPine (NORVASC) 5 mg tablet, Take 1 tablet (5 mg total) by mouth daily, Disp: 90 tablet, Rfl: 3  •  atorvastatin (LIPITOR) 80 mg tablet, Take 1 tablet (80 mg total) by mouth daily, Disp: 90 tablet, Rfl: 3  •  fluticasone (FLONASE) 50 mcg/act nasal spray, 2 sprays into each nostril daily, Disp: 16 g, Rfl: 5  •  metoprolol tartrate (LOPRESSOR) 50 mg tablet, Take 1 tablet (50 mg total) by mouth 2 (two) times a day, Disp: 180 tablet, Rfl: 3  •  nitroglycerin (NITROSTAT) 0 4 mg SL tablet, Place 1 tablet (0 4 mg total) under the tongue every 5 (five) minutes as needed for chest pain, Disp: 100 tablet, Rfl: 5  •  omeprazole (PriLOSEC) 20 mg delayed release capsule, Take 1 capsule (20 mg total) by mouth daily before breakfast, Disp: 30 capsule, Rfl: 5  •  traZODone (DESYREL) 50 mg tablet, Take 0 5 tablets (25 mg total) by mouth daily at bedtime, Disp: 90 tablet, Rfl: 5  •  Omega-3 Fatty Acids (fish oil) 1,000 mg, Take 1,000 mg by mouth daily (Patient not taking: Reported on 12/1/2022), Disp: , Rfl:   Allergies   Allergen Reactions   • Morphine Confusion and Delirium       Labs:     Chemistry        Component Value Date/Time    K 3 8 11/23/2022 0758    K 4 4 06/26/2018 0623     (H) 11/23/2022 0758     06/26/2018 0623    CO2 21 11/23/2022 0758    CO2 24 06/26/2018 0623    BUN 11 11/23/2022 0758    BUN 10 06/26/2018 0623    CREATININE 0 82 11/23/2022 0758        Component Value Date/Time    CALCIUM 10 0 11/23/2022 0758    CALCIUM 9 0 06/26/2018 0623    ALKPHOS 106 11/23/2022 0758    ALKPHOS 128 06/26/2018 0623    AST 17 11/23/2022 0758    AST 21 06/26/2018 0623    ALT 25 11/23/2022 0758    ALT 30 (H) 06/26/2018 0623            No results found for: CHOL  Lab Results   Component Value Date    HDL 55 11/23/2022    HDL 58 06/27/2022     Lab Results   Component Value Date    LDLCALC 35 11/23/2022    [address] 57 06/27/2022     Lab Results   Component Value Date    TRIG 95 11/23/2022    TRIG 90 06/27/2022     No results found for: CHOLHDL    Imaging: NM myocardial perfusion spect (rx stress and/or rest)    Result Date: 11/23/2022  Narrative: •  Stress ECG: The ECG was not diagnostic due to pharmacological (vasodilator) stress  •  Perfusion: There are no perfusion defects  •  Stress Function: Left ventricular function post-stress is normal  Post-stress ejection fraction is 75 %  Normal Nuclear  No ischemia or scar  ECG: sinus rhythm with PAC's  Low voltage  Septal infarct      ROS    Vitals:    12/01/22 1511   BP: 104/68   Pulse: 71     Vitals:    12/01/22 1511   Weight: 70 8 kg (156 lb)     Height: 5' 2" (157 5 cm)   Body mass index is 28 53 kg/m²  Physical Exam:  Physical Exam  Vitals reviewed  Constitutional:       General: She is not in acute distress  Appearance: She is not diaphoretic  HENT:      Head: Normocephalic and atraumatic  Eyes:      Pupils: Pupils are equal, round, and reactive to light  Neck:      Vascular: No carotid bruit  Cardiovascular:      Rate and Rhythm: Normal rate and regular rhythm  Pulses:           Radial pulses are 2+ on the right side and 2+ on the left side  Heart sounds: S1 normal and S2 normal  No murmur heard  Pulmonary:      Effort: Pulmonary effort is normal  No respiratory distress  Breath sounds: Normal breath sounds  No wheezing or rales  Abdominal:      General: There is no distension  Palpations: Abdomen is soft  Musculoskeletal:         General: No deformity or edema  Normal range of motion  Cervical back: Normal range of motion  Right lower leg: No edema  Left lower leg: No edema  Skin:     General: Skin is warm and dry  Findings: No erythema  Neurological:      Mental Status: She is alert and oriented to person, place, and time     Psychiatric:         Mood and Affect: Mood and affect normal          Behavior: Behavior normal

## 2022-12-01 ENCOUNTER — OFFICE VISIT (OUTPATIENT)
Dept: CARDIOLOGY CLINIC | Facility: HOSPITAL | Age: 57
End: 2022-12-01
Attending: FAMILY MEDICINE

## 2022-12-01 VITALS
WEIGHT: 156 LBS | DIASTOLIC BLOOD PRESSURE: 68 MMHG | BODY MASS INDEX: 28.71 KG/M2 | SYSTOLIC BLOOD PRESSURE: 104 MMHG | HEART RATE: 71 BPM | HEIGHT: 62 IN

## 2022-12-01 DIAGNOSIS — I25.119 CORONARY ARTERY DISEASE INVOLVING NATIVE CORONARY ARTERY OF NATIVE HEART WITH ANGINA PECTORIS (HCC): ICD-10-CM

## 2022-12-01 DIAGNOSIS — I25.10 CORONARY ARTERY DISEASE INVOLVING NATIVE CORONARY ARTERY OF NATIVE HEART WITHOUT ANGINA PECTORIS: Primary | ICD-10-CM

## 2022-12-01 DIAGNOSIS — Z95.5 S/P BARE METAL CORONARY ARTERY STENT: ICD-10-CM

## 2022-12-01 DIAGNOSIS — I10 ESSENTIAL HYPERTENSION: ICD-10-CM

## 2022-12-01 DIAGNOSIS — I25.10 ASCVD (ARTERIOSCLEROTIC CARDIOVASCULAR DISEASE): ICD-10-CM

## 2022-12-01 DIAGNOSIS — Z72.0 TOBACCO ABUSE: ICD-10-CM

## 2022-12-01 DIAGNOSIS — E78.5 DYSLIPIDEMIA: ICD-10-CM

## 2022-12-05 ENCOUNTER — TELEPHONE (OUTPATIENT)
Dept: FAMILY MEDICINE CLINIC | Facility: CLINIC | Age: 57
End: 2022-12-05

## 2022-12-05 DIAGNOSIS — F51.01 PRIMARY INSOMNIA: ICD-10-CM

## 2022-12-05 RX ORDER — TRAZODONE HYDROCHLORIDE 50 MG/1
50 TABLET ORAL
Qty: 90 TABLET | Refills: 5 | Status: SHIPPED | OUTPATIENT
Start: 2022-12-05 | End: 2022-12-15 | Stop reason: SDUPTHER

## 2022-12-05 NOTE — TELEPHONE ENCOUNTER
Pt states she discussed at last visit if trazodone 25mg was not effective,pt could increase to  50mg  Pt states she has increased it to 50mg and will need new rx sent to pharmacy as she will be due for refill  Pt made appt for 12/15 to follow up,just to make you aware also    ty

## 2022-12-15 ENCOUNTER — TELEMEDICINE (OUTPATIENT)
Dept: FAMILY MEDICINE CLINIC | Facility: CLINIC | Age: 57
End: 2022-12-15

## 2022-12-15 DIAGNOSIS — F51.01 PRIMARY INSOMNIA: ICD-10-CM

## 2022-12-15 DIAGNOSIS — Z12.4 SCREENING FOR CERVICAL CANCER: ICD-10-CM

## 2022-12-15 DIAGNOSIS — I10 ESSENTIAL HYPERTENSION: ICD-10-CM

## 2022-12-15 DIAGNOSIS — Z12.31 ENCOUNTER FOR SCREENING MAMMOGRAM FOR BREAST CANCER: ICD-10-CM

## 2022-12-15 DIAGNOSIS — F41.9 ANXIETY: ICD-10-CM

## 2022-12-16 RX ORDER — TRAZODONE HYDROCHLORIDE 100 MG/1
100 TABLET ORAL
Qty: 90 TABLET | Refills: 3 | Status: SHIPPED | OUTPATIENT
Start: 2022-12-16

## 2022-12-16 RX ORDER — ALPRAZOLAM 1 MG/1
1 TABLET ORAL 3 TIMES DAILY PRN
Qty: 270 TABLET | Refills: 3 | Status: SHIPPED | OUTPATIENT
Start: 2022-12-16

## 2022-12-21 NOTE — PROGRESS NOTES
Virtual Regular Visit    Verification of patient location:    Patient is located in the following state in which I hold an active license [place]      Assessment/Plan:    Problem List Items Addressed This Visit        Other    Anxiety    Relevant Medications    ALPRAZolam (XANAX) 1 mg tablet   Other Visit Diagnoses     Screening for cervical cancer        Encounter for screening mammogram for breast cancer        Primary insomnia        Relevant Medications    traZODone (DESYREL) 100 mg tablet    Essential hypertension                   Reason for visit is No chief complaint on file  Encounter provider Dmitriy Brian MD    Provider located at [address] [place] [address] [address]  [place] [address]      Recent Visits  No visits were found meeting these conditions  Showing recent visits within past 7 days and meeting all other requirements  Future Appointments  No visits were found meeting these conditions  Showing future appointments within next 150 days and meeting all other requirements       The patient was identified by name and date of birth  Kandice White was informed that this is a telemedicine visit and that the visit is being conducted through the [employer]  She agrees to proceed     My office door was closed  No one else was in the room  She acknowledged consent and understanding of privacy and security of the video platform  The patient has agreed to participate and understands they can discontinue the visit at any time  Patient is aware this is a billable service  Subjective  Kandice White is a 56 y o  female    It was my intent to perform this visit via video technology but the patient was not able to do a video connection so the visit was completed via audio telephone only  She would like to increase her dose of trazodone  In general, she historically had good results  She has no side effects      Her BP is at goal  She has no CP or SOB  She has no HA or vision changes  Past Medical History:   Diagnosis Date   • Angina pectoris ([facility] )    • Anxiety    • BMI 34 0-34 9,adult 3/29/2019   • GERD (gastroesophageal reflux disease)    • Myocardial infarction ([facility] )     1/24/16   • NSTEMI (non-ST elevated myocardial infarction) ([place] [address] ) 1/24/2016    Overview:  Max trop I 1 77([facility]) w/nml LVEF and wall motion  • Shortness of breath        Past Surgical History:   Procedure Laterality Date   • CARDIAC SURGERY     • CORONARY ANGIOPLASTY WITH STENT PLACEMENT     • TOE SURGERY     • TUBAL LIGATION     • TUBAL LIGATION         Current Outpatient Medications   Medication Sig Dispense Refill   • ALPRAZolam (XANAX) 1 mg tablet Take 1 tablet (1 mg total) by mouth 3 (three) times a day as needed for anxiety 270 tablet 3   • traZODone (DESYREL) 100 mg tablet Take 1 tablet (100 mg total) by mouth daily at bedtime 90 tablet 3   • amLODIPine (NORVASC) 5 mg tablet Take 1 tablet (5 mg total) by mouth daily 90 tablet 3   • atorvastatin (LIPITOR) 80 mg tablet Take 1 tablet (80 mg total) by mouth daily 90 tablet 3   • fluticasone (FLONASE) 50 mcg/act nasal spray 2 sprays into each nostril daily 16 g 5   • metoprolol tartrate (LOPRESSOR) 50 mg tablet Take 1 tablet (50 mg total) by mouth 2 (two) times a day 180 tablet 3   • nitroglycerin (NITROSTAT) 0 4 mg SL tablet Place 1 tablet (0 4 mg total) under the tongue every 5 (five) minutes as needed for chest pain 100 tablet 5   • Omega-3 Fatty Acids (fish oil) 1,000 mg Take 1,000 mg by mouth daily (Patient not taking: Reported on 12/1/2022)     • omeprazole (PriLOSEC) 20 mg delayed release capsule Take 1 capsule (20 mg total) by mouth daily before breakfast 30 capsule 5     No current facility-administered medications for this visit  Allergies   Allergen Reactions   • Morphine Confusion and Delirium       Review of Systems   All other systems reviewed and are negative        Video Exam    There were no vitals filed for this visit      Physical Exam     I spent 15 minutes directly with the patient during this visit

## 2023-04-01 DIAGNOSIS — I25.10 CORONARY ARTERY DISEASE INVOLVING NATIVE CORONARY ARTERY OF NATIVE HEART WITHOUT ANGINA PECTORIS: ICD-10-CM

## 2023-04-03 RX ORDER — METOPROLOL TARTRATE 50 MG/1
TABLET, FILM COATED ORAL
Qty: 180 TABLET | Refills: 3 | Status: SHIPPED | OUTPATIENT
Start: 2023-04-03

## 2023-04-04 ENCOUNTER — OFFICE VISIT (OUTPATIENT)
Dept: FAMILY MEDICINE CLINIC | Facility: CLINIC | Age: 58
End: 2023-04-04

## 2023-04-04 VITALS
BODY MASS INDEX: 27.79 KG/M2 | OXYGEN SATURATION: 97 % | WEIGHT: 151 LBS | TEMPERATURE: 97.2 F | HEART RATE: 70 BPM | HEIGHT: 62 IN | SYSTOLIC BLOOD PRESSURE: 110 MMHG | DIASTOLIC BLOOD PRESSURE: 72 MMHG

## 2023-04-04 DIAGNOSIS — Z95.5 S/P BARE METAL CORONARY ARTERY STENT: ICD-10-CM

## 2023-04-04 DIAGNOSIS — I25.119 CORONARY ARTERY DISEASE INVOLVING NATIVE CORONARY ARTERY OF NATIVE HEART WITH ANGINA PECTORIS (HCC): ICD-10-CM

## 2023-04-04 DIAGNOSIS — I25.10 ASCVD (ARTERIOSCLEROTIC CARDIOVASCULAR DISEASE): ICD-10-CM

## 2023-04-04 DIAGNOSIS — R00.2 PALPITATIONS: ICD-10-CM

## 2023-04-04 DIAGNOSIS — K21.9 GASTROESOPHAGEAL REFLUX DISEASE, UNSPECIFIED WHETHER ESOPHAGITIS PRESENT: ICD-10-CM

## 2023-04-04 DIAGNOSIS — M79.672 LEFT FOOT PAIN: ICD-10-CM

## 2023-04-04 DIAGNOSIS — R10.11 RUQ PAIN: ICD-10-CM

## 2023-04-04 DIAGNOSIS — T78.40XA ALLERGY, INITIAL ENCOUNTER: ICD-10-CM

## 2023-04-04 DIAGNOSIS — J42 CHRONIC BRONCHITIS, UNSPECIFIED CHRONIC BRONCHITIS TYPE (HCC): ICD-10-CM

## 2023-04-04 DIAGNOSIS — E78.2 MIXED HYPERLIPIDEMIA: ICD-10-CM

## 2023-04-04 DIAGNOSIS — I25.2 STATUS POST NON-ST ELEVATION MYOCARDIAL INFARCTION (NSTEMI): ICD-10-CM

## 2023-04-04 DIAGNOSIS — R69 SICK: Primary | ICD-10-CM

## 2023-04-04 RX ORDER — AMOXICILLIN 500 MG/1
500 CAPSULE ORAL EVERY 8 HOURS SCHEDULED
Qty: 21 CAPSULE | Refills: 0 | Status: SHIPPED | OUTPATIENT
Start: 2023-04-04 | End: 2023-04-11

## 2023-04-04 RX ORDER — MELOXICAM 15 MG/1
15 TABLET ORAL DAILY
Qty: 90 TABLET | Refills: 3 | Status: SHIPPED | OUTPATIENT
Start: 2023-04-04

## 2023-04-04 RX ORDER — MONTELUKAST SODIUM 10 MG/1
10 TABLET ORAL
Qty: 90 TABLET | Refills: 3 | Status: SHIPPED | OUTPATIENT
Start: 2023-04-04

## 2023-04-04 RX ORDER — CEPHALEXIN 500 MG/1
500 CAPSULE ORAL EVERY 8 HOURS SCHEDULED
Qty: 21 CAPSULE | Refills: 0 | Status: SHIPPED | OUTPATIENT
Start: 2023-04-04 | End: 2023-04-11

## 2023-04-05 NOTE — PROGRESS NOTES
Name: Ivette Raymond      : 1965      MRN: [id number]  Encounter Provider: Harvey Orourke MD  Encounter Date: 2023   Encounter department: [address] [place] [address]    Assessment & Plan     1  Sick  -     amoxicillin (AMOXIL) 500 mg capsule; Take 1 capsule (500 mg total) by mouth every 8 (eight) hours for 7 days  -     cephalexin (KEFLEX) 500 mg capsule; Take 1 capsule (500 mg total) by mouth every 8 (eight) hours for 7 days    2  Chronic bronchitis, unspecified chronic bronchitis type ([facility] )    3  Coronary artery disease involving native coronary artery of native heart with angina pectoris [facility])  -     Ambulatory Referral to Cardiology; Future    4  Allergy, initial encounter  -     montelukast (SINGULAIR) 10 mg tablet; Take 1 tablet (10 mg total) by mouth daily at bedtime    5  Left foot pain  -     meloxicam (Mobic) 15 mg tablet; Take 1 tablet (15 mg total) by mouth daily    6  Palpitations  -     Holter monitor; Future; Expected date: 2023  -     TSH, 3rd generation with Free T4 reflex; Future  -     Comprehensive metabolic panel; Future  -     CBC and differential; Future    7  RUQ pain  -     US right upper quadrant; Future; Expected date: 2023  -     Celiac Disease Antibody Profile; Future    8  Gastroesophageal reflux disease, unspecified whether esophagitis present    9  ASCVD (arteriosclerotic cardiovascular disease)    10  Mixed hyperlipidemia    11  S/p bare metal coronary artery stent    12  Status post non-ST elevation myocardial infarction (NSTEMI)           Subjective      She has seasonal allergies  She does not like Flonase  She takes a second generation antihistamine  She has ASCVD  She denies CP or SOB  She has no PND or orthopnea  She cut the back of her left had with a cup two days ago  There is no bleeding or discharge  She has no F/C      Review of Systems    Current Outpatient Medications on File Prior to Visit   Medication Sig   • "ALPRAZolam (XANAX) 1 mg tablet Take 1 tablet (1 mg total) by mouth 3 (three) times a day as needed for anxiety   • amLODIPine (NORVASC) 5 mg tablet Take 1 tablet (5 mg total) by mouth daily   • atorvastatin (LIPITOR) 80 mg tablet Take 1 tablet (80 mg total) by mouth daily   • metoprolol tartrate (LOPRESSOR) 50 mg tablet take 1 tablet by mouth twice a day   • nitroglycerin (NITROSTAT) 0 4 mg SL tablet Place 1 tablet (0 4 mg total) under the tongue every 5 (five) minutes as needed for chest pain   • omeprazole (PriLOSEC) 20 mg delayed release capsule Take 1 capsule (20 mg total) by mouth daily before breakfast   • traZODone (DESYREL) 100 mg tablet Take 1 tablet (100 mg total) by mouth daily at bedtime   • Omega-3 Fatty Acids (fish oil) 1,000 mg Take 1,000 mg by mouth daily (Patient not taking: Reported on 12/1/2022)       Objective     /72   Pulse 70   Temp (!) 97 2 °F (36 2 °C)   Ht 5' 2\" (1 575 m)   Wt 68 5 kg (151 lb)   SpO2 97%   BMI 27 62 kg/m²     Physical Exam  Elvira Wheat MD  "

## 2023-04-25 ENCOUNTER — HOSPITAL ENCOUNTER (OUTPATIENT)
Dept: MRI IMAGING | Facility: HOSPITAL | Age: 58
Discharge: HOME | End: 2023-04-25
Attending: FAMILY MEDICINE

## 2023-04-25 DIAGNOSIS — R93.5 ABNORMAL US (ULTRASOUND) OF ABDOMEN: ICD-10-CM

## 2023-04-25 RX ADMIN — GADOBUTROL 7 ML: 604.72 INJECTION INTRAVENOUS at 15:20

## 2023-05-03 ENCOUNTER — TELEMEDICINE (OUTPATIENT)
Dept: FAMILY MEDICINE CLINIC | Facility: CLINIC | Age: 58
End: 2023-05-03

## 2023-05-03 DIAGNOSIS — K52.9 INFLAMMATORY BOWEL DISEASE: Primary | ICD-10-CM

## 2023-05-03 NOTE — ASSESSMENT & PLAN NOTE
MRI shows ileitis consist with Inflammatory bowel disease   Multiple days of loose stools, diet dependent  No blood in stools  No oral ulcers  A/w rapid weight loss 151 to 142  Colonoscopy in 2017 was normal  Has cut out bread, dairy, caffeine (except a small coffee in a m)  No smoking, no alcohol  Pt tearful and anxious  Plan:  Discussed Elimination diet  Referral to GI (appointment in [facility] on May 10th at 10:15am with Dr Yeny Altamirano at [address] [place] [address], [address])

## 2023-05-04 NOTE — PROGRESS NOTES
Assessment/Plan:    Inflammatory bowel disease  MRI shows ileitis consist with Inflammatory bowel disease   Multiple days of loose stools, diet dependent  No blood in stools  No oral ulcers  A/w rapid weight loss 151 to 142  Colonoscopy in 2017 was normal  Has cut out bread, dairy, caffeine (except a small coffee in a m)  No smoking, no alcohol  Pt tearful and anxious  Plan:  Discussed Elimination diet  Referral to GI (appointment in [facility] on May 10th at 10:15am with Dr Anthony Houser at [address] [place] [address], [address])       Diagnoses and all orders for this visit:    Inflammatory bowel disease  -     Ambulatory Referral to Gastroenterology; Future          Subjective:      Patient ID: Treasure Cabral is a 56 y o  female  Pt is a 55 yo female with watery diarrhea x multiple days  Pt c/o watery diarrhea a/w with 30 pnd weight loss in 2 weeks  Pt competed MRI which shows ileitis; IBD  Denies blood in stool  No oral ulcers  She has cut out dairy, still drinking coffee in the morning `to help things going`  Pt is scared about the rapid weigt loss, asking if colonoscopy or EGD is needed  The following portions of the patient's history were reviewed and updated as appropriate: allergies, current medications, past family history, past medical history, past social history, past surgical history and problem list     Review of Systems   Constitutional: Positive for unexpected weight change  Negative for chills and fever  HENT: Negative for ear pain and sore throat  Eyes: Negative for pain and visual disturbance  Respiratory: Negative for cough and shortness of breath  Cardiovascular: Negative for chest pain and palpitations  Gastrointestinal: Positive for abdominal pain and diarrhea  Negative for constipation, nausea and vomiting  Genitourinary: Negative for dysuria and hematuria  Musculoskeletal: Negative for arthralgias and back pain  Skin: Negative for color change and rash     Neurological: Negative for seizures and syncope  All other systems reviewed and are negative  Objective: There were no vitals taken for this visit        Physical exam obtained via phone call:  No acute distress  Tearful, anxious  speaking in full sentences  Thought process normal

## 2023-05-10 ENCOUNTER — TELEPHONE (OUTPATIENT)
Dept: OTHER | Facility: OTHER | Age: 58
End: 2023-05-10

## 2023-05-10 ENCOUNTER — CONSULT (OUTPATIENT)
Dept: GASTROENTEROLOGY | Facility: MEDICAL CENTER | Age: 58
End: 2023-05-10

## 2023-05-10 VITALS
HEART RATE: 82 BPM | SYSTOLIC BLOOD PRESSURE: 110 MMHG | WEIGHT: 137.6 LBS | BODY MASS INDEX: 25.17 KG/M2 | TEMPERATURE: 97.1 F | DIASTOLIC BLOOD PRESSURE: 77 MMHG

## 2023-05-10 DIAGNOSIS — R09.89 CHRONIC THROAT CLEARING: ICD-10-CM

## 2023-05-10 DIAGNOSIS — K52.9 INFLAMMATORY BOWEL DISEASE: ICD-10-CM

## 2023-05-10 DIAGNOSIS — R93.5 ABNORMAL MRI OF ABDOMEN: ICD-10-CM

## 2023-05-10 DIAGNOSIS — K52.9 ILEITIS: ICD-10-CM

## 2023-05-10 DIAGNOSIS — R13.10 DYSPHAGIA, UNSPECIFIED TYPE: Primary | ICD-10-CM

## 2023-05-10 DIAGNOSIS — R19.8 IRREGULAR BOWEL HABITS: ICD-10-CM

## 2023-05-10 DIAGNOSIS — R10.13 EPIGASTRIC ABDOMINAL PAIN: ICD-10-CM

## 2023-05-10 RX ORDER — OMEPRAZOLE 20 MG/1
20 CAPSULE, DELAYED RELEASE ORAL DAILY
Qty: 30 CAPSULE | Refills: 11 | Status: SHIPPED | OUTPATIENT
Start: 2023-05-10

## 2023-05-10 NOTE — H&P (VIEW-ONLY)
[facility] - Outpatient Consultation  Blanca Cobb 56 y o  female MRN: [id number]  Encounter: [id number]          ASSESSMENT AND PLAN:    65year-old female with hypertension, hyperlipidemia, anxiety here for months of epigastric pain  Suspect that her upper abdominal symptoms are peptic in nature and related to the fact that she discontinued her omeprazole  Will resume PPI but also check EGD to ensure no stricture, ring, esophagitis, etc  given her dysphagia  She does endorse a mild increase in bowel movement frequency and was found incidentally on MR abdomen that was performed for liver hemangioma to have possible TI thickening  I do not think her symptoms are consistent with Crohn's disease but will get colonoscopy at time of EGD to biopsy the TI and assess for etiology of TI thickening  1  Inflammatory bowel disease  Symptoms not consistent with Crohn's disease but will get colonoscopy with TI biopsies  - Ambulatory Referral to Gastroenterology    2  Ileitis  3  Abnormal MRI of abdomen  - Colonoscopy; Future    4  Dysphagia, unspecified type  5  Chronic throat clearing  6  Epigastric abdominal pain  - EGD; Future  - omeprazole (PriLOSEC) 20 mg delayed release capsule; Take 1 capsule (20 mg total) by mouth daily  Dispense: 30 capsule; Refill: 11    7  Irregular bowel habits  Trial powdered fiber supplement to regulate bowels      ______________________________________________________________________    HPI:    Has been having horrible GI troubles  Feels like food is getting stuck in throat, happens with liquids and solids  Has to stretch out neck to help it go down  Doing a lot of throat clearing  Gets epigastric pain every day, no excruciating but feels like something is not right  Has been having more bloating, burping, and gas  Has no bottom teeth and takes out top teeth  Has not had teeth for 27 years      Stopped taking omeprazole about 8 months ago because she had been feeling well  Has had to really restrict diet  Avoiding coffee and lactose  First bowel movement of day is hard and then has second BM which is looser  Not sure what to eat anymore  Had gained 30# to 173 in April 2022 but then lost it again  Celiac antibody panel, CBC, CMP, TSH 4/11/23 unremarkable    Last seen 5/20/2020 by Councemitzy Santillan for dyspepsia, bloating, GERD, family history of colon polyps  Colonoscopy 8/16/2019 with 4 diminutive polyps  None of which were adenomas on path    Quit smoking 2 years ago  EGD 8/16/19 normal with unremarkable gastric and duodenal biopsies  REVIEW OF SYSTEMS:    CONSTITUTIONAL: Denies any fever, chills, rigors, and weight loss  HEENT: No earache or tinnitus  Denies hearing loss or visual disturbances  CARDIOVASCULAR: No chest pain or palpitations  RESPIRATORY: Denies any cough, hemoptysis, shortness of breath or dyspnea on exertion  GASTROINTESTINAL: As noted in the History of Present Illness  GENITOURINARY: No problems with urination  Denies any hematuria or dysuria  NEUROLOGIC: No dizziness or vertigo, denies headaches  MUSCULOSKELETAL: Denies any muscle or joint pain  SKIN: Denies skin rashes or itching  ENDOCRINE: Denies excessive thirst  Denies intolerance to heat or cold  PSYCHOSOCIAL: Denies depression or anxiety  Denies any recent memory loss  Historical Information   Past Medical History:   Diagnosis Date   • Angina pectoris ([facility] )    • Anxiety    • BMI 34 0-34 9,adult 3/29/2019   • GERD (gastroesophageal reflux disease)    • Myocardial infarction ([facility] [address] )     1/24/16   • NSTEMI (non-ST elevated myocardial infarction) ([facility] ) 1/24/2016    Overview:  Max trop I 1 77([facility]) w/nml LVEF and wall motion      • Shortness of breath      Past Surgical History:   Procedure Laterality Date   • CARDIAC SURGERY     • CORONARY ANGIOPLASTY WITH STENT PLACEMENT     • TOE SURGERY     • TUBAL LIGATION     • TUBAL LIGATION       Social History   Social History     Substance and Sexual Activity   Alcohol Use No     Social History     Substance and Sexual Activity   Drug Use No     Social History     Tobacco Use   Smoking Status Former   • Types: Cigarettes   Smokeless Tobacco Never     Family History   Problem Relation Age of Onset   • Atrial fibrillation Mother    • Heart failure Mother    • Diabetes type II Mother    • Hypertension Mother    • Lung disease Mother         Pleural Effusion   • Hypertension Father        Meds/Allergies       Current Outpatient Medications:   •  ALPRAZolam (XANAX) 1 mg tablet  •  amLODIPine (NORVASC) 5 mg tablet  •  atorvastatin (LIPITOR) 80 mg tablet  •  meloxicam (Mobic) 15 mg tablet  •  metoprolol tartrate (LOPRESSOR) 50 mg tablet  •  montelukast (SINGULAIR) 10 mg tablet  •  nitroglycerin (NITROSTAT) 0 4 mg SL tablet  •  omeprazole (PriLOSEC) 20 mg delayed release capsule  •  traZODone (DESYREL) 100 mg tablet  •  Omega-3 Fatty Acids (fish oil) 1,000 mg    Allergies   Allergen Reactions   • Morphine Confusion and Delirium           Objective   /77 (BP Location: Right arm)   Pulse 82   Temp (!) 97 1 °F (36 2 °C)   Wt 62 4 kg (137 lb 9 6 oz)   BMI 25 17 kg/m²         PHYSICAL EXAM:      General Appearance:   Alert, cooperative, no distress   HEENT:   Normocephalic, atraumatic, anicteric  Neck:  Supple, symmetrical, trachea midline   Lungs:   Clear to auscultation bilaterally; no rales, rhonchi or wheezing; respirations unlabored    Heart[de-identified]   Regular rate and rhythm; no murmur, rub, or gallop  Abdomen:   Soft, mild epigastric TTP, non-distended; normal bowel sounds; no masses, no organomegaly    Genitalia:   Deferred    Rectal:   Deferred    Extremities:  No cyanosis, clubbing or edema    Pulses:  2+ and symmetric    Skin:  No jaundice, rashes, or lesions    Lymph nodes:  No palpable cervical lymphadenopathy        Lab Results:   No visits with results within 1 Day(s) from this visit     Latest known visit with results is:   Lab on 04/11/2023   Component Date Value   • TSH 3RD GENERATON 04/11/2023 2 364    • Sodium 04/11/2023 138    • Potassium 04/11/2023 4 0    • Chloride 04/11/2023 103    • CO2 04/11/2023 27    • ANION GAP 04/11/2023 8    • BUN 04/11/2023 10    • Creatinine 04/11/2023 0 75    • Glucose, Fasting 04/11/2023 91    • Calcium 04/11/2023 9 8    • AST 04/11/2023 15    • ALT 04/11/2023 14    • Alkaline Phosphatase 04/11/2023 95    • Total Protein 04/11/2023 7 4    • Albumin 04/11/2023 4 6    • Total Bilirubin 04/11/2023 0 69    • eGFR 04/11/2023 90    • WBC 04/11/2023 6 84    • RBC 04/11/2023 4 95    • Hemoglobin 04/11/2023 15 3    • Hematocrit 04/11/2023 44 2    • MCV 04/11/2023 89    • MCH 04/11/2023 30 9    • MCHC 04/11/2023 34 6    • RDW 04/11/2023 11 9    • MPV 04/11/2023 9 3    • Platelets 70/45/5128 246    • nRBC 04/11/2023 0    • Neutrophils Relative 04/11/2023 74    • Immat GRANS % 04/11/2023 1    • Lymphocytes Relative 04/11/2023 16    • Monocytes Relative 04/11/2023 7    • Eosinophils Relative 04/11/2023 1    • Basophils Relative 04/11/2023 1    • Neutrophils Absolute 04/11/2023 5 08    • Immature Grans Absolute 04/11/2023 0 04    • Lymphocytes Absolute 04/11/2023 1 10    • Monocytes Absolute 04/11/2023 0 48    • Eosinophils Absolute 04/11/2023 0 07    • Basophils Absolute 04/11/2023 0 07    • IgA 04/11/2023 119    • Gliadin IgA 04/11/2023 4    • Gliadin IgG 04/11/2023 3    • Tissue Transglut Ab IGG 04/11/2023 <2    • TISSUE TRANSGLUTAMINASE * 04/11/2023 <2    • Endomysial IgA 04/11/2023 Negative          Radiology Results:   MRI abdomen w wo contrast    Result Date: 4/27/2023  Narrative: MRI - ABDOMEN - WITH AND WITHOUT CONTRAST INDICATION: 56 years / Female  R93 5: Abnormal findings on diagnostic imaging of other abdominal regions, including retroperitoneum  COMPARISON: Right upper quadrant ultrasound dated 4/11/2023, abdominal MRI dated 2/18/2019   TECHNIQUE:  Multiplanar/multisequence MRI of the abdomen was performed before and after administration of contrast  IV Contrast:  7 mL of Gadobutrol injection (SINGLE-DOSE) FINDINGS: LOWER CHEST:   Unremarkable  LIVER: Within normal limits of size and configuration  No suspicious mass  Again demonstrated is a 6 mm T2 hyperintense lesion in the posterior right hepatic lobe (image 15, series 5) with suggestion of progressive enhancement on the portal venous and delayed phases  Although definitive diagnosis is limited due to small size of the lesion, this is probably a tiny flash filling hemangioma, not significantly changed in size or appearance from 2019  The hepatic veins and portal veins are patent  BILE DUCTS: No intrahepatic or extrahepatic bile duct dilation  GALLBLADDER: Unremarkable  PANCREAS:  Unremarkable  ADRENAL GLANDS: Unremarkable  SPLEEN: Unremarkable  KIDNEYS/PROXIMAL URETERS: No hydroureteronephrosis  No suspicious renal mass  BOWEL:   No dilated loops of bowel  There is hyperenhancement of the wall of the terminal ileum (image 43, series 12) without evidence for stricture  PERITONEUM/RETROPERITONEUM: No mass  No ascites  LYMPH NODES: Multiple prominent right lower quadrant lymph nodes measuring up to 1 3 cm in short axis (image 39, series 12)  VASCULAR STRUCTURES:  No aneurysm  ABDOMINAL WALL:  Unremarkable  OSSEOUS STRUCTURES:  No suspicious osseous lesion  Impression: No suspicious liver mass  Stable 6 mm posterior right hepatic lobe lesion  Although definitive diagnosis is limited due to small size, this is probably a tiny flash filling hemangioma, not significantly changed in size or appearance from 2019  Hyperenhancement of the wall of the terminal ileum without evidence for stricture suggestive of ileitis  Multiple prominent right lower quadrant lymph nodes, likely reactive  Correlate for history of inflammatory bowel disease  The study was marked in EPIC for significant notification   Workstation performed: [id number]     US right upper quadrant    Result Date: 2023  Narrative: RIGHT UPPER QUADRANT ULTRASOUND INDICATION:     R10 11: Right upper quadrant pain  COMPARISON:  MRI dated 2019 TECHNIQUE:   Real-time ultrasound of the right upper quadrant was performed with a curvilinear transducer with both volumetric sweeps and still imaging techniques  FINDINGS: PANCREAS:  Portions of the pancreas are obscured by bowel gas  Visualized portions of the pancreas are unremarkable  AORTA AND IVC:  Visualized portions are normal for patient age  LIVER: Size:  Within normal range  The liver measures 15 6 cm in the midclavicular line  Contour:  Surface contour is smooth  Parenchyma:  Echogenicity and echotexture are within normal limits  There is a echogenic lesion in the right hepatic lobe measuring 1 4 x 1 x 0 7 cm, previously measuring 6 mm on prior MRI abdomen  Limited imaging of the main portal vein shows it to be patent and hepatopetal   BILIARY: No gallbladder findings  No intrahepatic biliary dilatation  CBD measures 3 0 mm  No choledocholithiasis  KIDNEY: Right kidney measures 10 8 x 4 7 x 5 8 cm  Volume 155 7 mL Kidney within normal limits  ASCITES:   None  Impression: Echogenic lesion in the right hepatic lobe likely representing a hemangioma  This lesion has Increased in size compared to prior MRI of the abdomen  Recommend repeat MRI of the abdomen with contrast for further evaluation  The study was marked in EPIC for significant notification  Workstation performed: [id number]     Holter monitor    Result Date: 2023  Narrative: PT NAME: Ari Moreno : 1965  AGE: 56 y o  GENDER: female MRN: [id number]   PROCEDURE: Holter monitor INDICATIONS: palpitations     Impression: The patient had predominantly sinus rhythm  Heart rate varied from 45 bpm to 121 bpm   The patient’s average heart rate was 69 bpm   The patient had a holter monitor tracing done for 47 hours and 54 minutes  The patient had 6 PVCs   The patient had 115 supraventricular ectopic beats  There are 3 couplets  There are 3 runs, the longest consisting on 4 beats  The longest R/R interval was 1 7 seconds  No other major arrhythmia was noted

## 2023-05-10 NOTE — TELEPHONE ENCOUNTER
Pt called in stating she doesn't have her prep and she would like the prep instructions mailed to her home address  She's requesting a call back at [phone] when the prep has been called in

## 2023-05-10 NOTE — PROGRESS NOTES
[facility] - Outpatient Consultation  Luther Brock 56 y o  female MRN: [id number]  Encounter: [id number]          ASSESSMENT AND PLAN:    30-year-old female with hypertension, hyperlipidemia, anxiety here for months of epigastric pain  Suspect that her upper abdominal symptoms are peptic in nature and related to the fact that she discontinued her omeprazole  Will resume PPI but also check EGD to ensure no stricture, ring, esophagitis, etc  given her dysphagia  She does endorse a mild increase in bowel movement frequency and was found incidentally on MR abdomen that was performed for liver hemangioma to have possible TI thickening  I do not think her symptoms are consistent with Crohn's disease but will get colonoscopy at time of EGD to biopsy the TI and assess for etiology of TI thickening  1  Inflammatory bowel disease  Symptoms not consistent with Crohn's disease but will get colonoscopy with TI biopsies  - Ambulatory Referral to Gastroenterology    2  Ileitis  3  Abnormal MRI of abdomen  - Colonoscopy; Future    4  Dysphagia, unspecified type  5  Chronic throat clearing  6  Epigastric abdominal pain  - EGD; Future  - omeprazole (PriLOSEC) 20 mg delayed release capsule; Take 1 capsule (20 mg total) by mouth daily  Dispense: 30 capsule; Refill: 11    7  Irregular bowel habits  Trial powdered fiber supplement to regulate bowels      ______________________________________________________________________    HPI:    Has been having horrible GI troubles  Feels like food is getting stuck in throat, happens with liquids and solids  Has to stretch out neck to help it go down  Doing a lot of throat clearing  Gets epigastric pain every day, no excruciating but feels like something is not right  Has been having more bloating, burping, and gas  Has no bottom teeth and takes out top teeth  Has not had teeth for 27 years      Stopped taking omeprazole about 8 months ago because she had been feeling well  Has had to really restrict diet  Avoiding coffee and lactose  First bowel movement of day is hard and then has second BM which is looser  Not sure what to eat anymore  Had gained 30# to 173 in April 2022 but then lost it again  Celiac antibody panel, CBC, CMP, TSH 4/11/23 unremarkable    Last seen 5/20/2020 by Fritz Swift for dyspepsia, bloating, GERD, family history of colon polyps  Colonoscopy 8/16/2019 with 4 diminutive polyps  None of which were adenomas on path    Quit smoking 2 years ago  EGD 8/16/19 normal with unremarkable gastric and duodenal biopsies  REVIEW OF SYSTEMS:    CONSTITUTIONAL: Denies any fever, chills, rigors, and weight loss  HEENT: No earache or tinnitus  Denies hearing loss or visual disturbances  CARDIOVASCULAR: No chest pain or palpitations  RESPIRATORY: Denies any cough, hemoptysis, shortness of breath or dyspnea on exertion  GASTROINTESTINAL: As noted in the History of Present Illness  GENITOURINARY: No problems with urination  Denies any hematuria or dysuria  NEUROLOGIC: No dizziness or vertigo, denies headaches  MUSCULOSKELETAL: Denies any muscle or joint pain  SKIN: Denies skin rashes or itching  ENDOCRINE: Denies excessive thirst  Denies intolerance to heat or cold  PSYCHOSOCIAL: Denies depression or anxiety  Denies any recent memory loss  Historical Information   Past Medical History:   Diagnosis Date   • Angina pectoris ([facility] )    • Anxiety    • BMI 34 0-34 9,adult 3/29/2019   • GERD (gastroesophageal reflux disease)    • Myocardial infarction ([facility] )     1/24/16   • NSTEMI (non-ST elevated myocardial infarction) ([facility] ) 1/24/2016    Overview:  Max trop I 1 77([facility]) w/nml LVEF and wall motion      • Shortness of breath      Past Surgical History:   Procedure Laterality Date   • CARDIAC SURGERY     • CORONARY ANGIOPLASTY WITH STENT PLACEMENT     • TOE SURGERY     • TUBAL LIGATION     • TUBAL LIGATION       Social History   Social History     Substance and Sexual Activity   Alcohol Use No     Social History     Substance and Sexual Activity   Drug Use No     Social History     Tobacco Use   Smoking Status Former   • Types: Cigarettes   Smokeless Tobacco Never     Family History   Problem Relation Age of Onset   • Atrial fibrillation Mother    • Heart failure Mother    • Diabetes type II Mother    • Hypertension Mother    • Lung disease Mother         Pleural Effusion   • Hypertension Father        Meds/Allergies       Current Outpatient Medications:   •  ALPRAZolam (XANAX) 1 mg tablet  •  amLODIPine (NORVASC) 5 mg tablet  •  atorvastatin (LIPITOR) 80 mg tablet  •  meloxicam (Mobic) 15 mg tablet  •  metoprolol tartrate (LOPRESSOR) 50 mg tablet  •  montelukast (SINGULAIR) 10 mg tablet  •  nitroglycerin (NITROSTAT) 0 4 mg SL tablet  •  omeprazole (PriLOSEC) 20 mg delayed release capsule  •  traZODone (DESYREL) 100 mg tablet  •  Omega-3 Fatty Acids (fish oil) 1,000 mg    Allergies   Allergen Reactions   • Morphine Confusion and Delirium           Objective   /77 (BP Location: Right arm)   Pulse 82   Temp (!) 97 1 °F (36 2 °C)   Wt 62 4 kg (137 lb 9 6 oz)   BMI 25 17 kg/m²         PHYSICAL EXAM:      General Appearance:   Alert, cooperative, no distress   HEENT:   Normocephalic, atraumatic, anicteric  Neck:  Supple, symmetrical, trachea midline   Lungs:   Clear to auscultation bilaterally; no rales, rhonchi or wheezing; respirations unlabored    Heart[de-identified]   Regular rate and rhythm; no murmur, rub, or gallop  Abdomen:   Soft, mild epigastric TTP, non-distended; normal bowel sounds; no masses, no organomegaly    Genitalia:   Deferred    Rectal:   Deferred    Extremities:  No cyanosis, clubbing or edema    Pulses:  2+ and symmetric    Skin:  No jaundice, rashes, or lesions    Lymph nodes:  No palpable cervical lymphadenopathy        Lab Results:   No visits with results within 1 Day(s) from this visit     Latest known visit with results is:   Lab on 04/11/2023   Component Date Value   • TSH 3RD GENERATON 04/11/2023 2 364    • Sodium 04/11/2023 138    • Potassium 04/11/2023 4 0    • Chloride 04/11/2023 103    • CO2 04/11/2023 27    • ANION GAP 04/11/2023 8    • BUN 04/11/2023 10    • Creatinine 04/11/2023 0 75    • Glucose, Fasting 04/11/2023 91    • Calcium 04/11/2023 9 8    • AST 04/11/2023 15    • ALT 04/11/2023 14    • Alkaline Phosphatase 04/11/2023 95    • Total Protein 04/11/2023 7 4    • Albumin 04/11/2023 4 6    • Total Bilirubin 04/11/2023 0 69    • eGFR 04/11/2023 90    • WBC 04/11/2023 6 84    • RBC 04/11/2023 4 95    • Hemoglobin 04/11/2023 15 3    • Hematocrit 04/11/2023 44 2    • MCV 04/11/2023 89    • MCH 04/11/2023 30 9    • MCHC 04/11/2023 34 6    • RDW 04/11/2023 11 9    • MPV 04/11/2023 9 3    • Platelets 68/67/0924 246    • nRBC 04/11/2023 0    • Neutrophils Relative 04/11/2023 74    • Immat GRANS % 04/11/2023 1    • Lymphocytes Relative 04/11/2023 16    • Monocytes Relative 04/11/2023 7    • Eosinophils Relative 04/11/2023 1    • Basophils Relative 04/11/2023 1    • Neutrophils Absolute 04/11/2023 5 08    • Immature Grans Absolute 04/11/2023 0 04    • Lymphocytes Absolute 04/11/2023 1 10    • Monocytes Absolute 04/11/2023 0 48    • Eosinophils Absolute 04/11/2023 0 07    • Basophils Absolute 04/11/2023 0 07    • IgA 04/11/2023 119    • Gliadin IgA 04/11/2023 4    • Gliadin IgG 04/11/2023 3    • Tissue Transglut Ab IGG 04/11/2023 <2    • TISSUE TRANSGLUTAMINASE * 04/11/2023 <2    • Endomysial IgA 04/11/2023 Negative          Radiology Results:   MRI abdomen w wo contrast    Result Date: 4/27/2023  Narrative: MRI - ABDOMEN - WITH AND WITHOUT CONTRAST INDICATION: 56 years / Female  R93 5: Abnormal findings on diagnostic imaging of other abdominal regions, including retroperitoneum  COMPARISON: Right upper quadrant ultrasound dated 4/11/2023, abdominal MRI dated 2/18/2019   TECHNIQUE:  Multiplanar/multisequence MRI of the abdomen was performed before and after administration of contrast  IV Contrast:  7 mL of Gadobutrol injection (SINGLE-DOSE) FINDINGS: LOWER CHEST:   Unremarkable  LIVER: Within normal limits of size and configuration  No suspicious mass  Again demonstrated is a 6 mm T2 hyperintense lesion in the posterior right hepatic lobe (image 15, series 5) with suggestion of progressive enhancement on the portal venous and delayed phases  Although definitive diagnosis is limited due to small size of the lesion, this is probably a tiny flash filling hemangioma, not significantly changed in size or appearance from 2019  The hepatic veins and portal veins are patent  BILE DUCTS: No intrahepatic or extrahepatic bile duct dilation  GALLBLADDER: Unremarkable  PANCREAS:  Unremarkable  ADRENAL GLANDS: Unremarkable  SPLEEN: Unremarkable  KIDNEYS/PROXIMAL URETERS: No hydroureteronephrosis  No suspicious renal mass  BOWEL:   No dilated loops of bowel  There is hyperenhancement of the wall of the terminal ileum (image 43, series 12) without evidence for stricture  PERITONEUM/RETROPERITONEUM: No mass  No ascites  LYMPH NODES: Multiple prominent right lower quadrant lymph nodes measuring up to 1 3 cm in short axis (image 39, series 12)  VASCULAR STRUCTURES:  No aneurysm  ABDOMINAL WALL:  Unremarkable  OSSEOUS STRUCTURES:  No suspicious osseous lesion  Impression: No suspicious liver mass  Stable 6 mm posterior right hepatic lobe lesion  Although definitive diagnosis is limited due to small size, this is probably a tiny flash filling hemangioma, not significantly changed in size or appearance from 2019  Hyperenhancement of the wall of the terminal ileum without evidence for stricture suggestive of ileitis  Multiple prominent right lower quadrant lymph nodes, likely reactive  Correlate for history of inflammatory bowel disease  The study was marked in EPIC for significant notification   Workstation performed: [id number]     US right upper quadrant    Result Date: 2023  Narrative: RIGHT UPPER QUADRANT ULTRASOUND INDICATION:     R10 11: Right upper quadrant pain  COMPARISON:  MRI dated 2019 TECHNIQUE:   Real-time ultrasound of the right upper quadrant was performed with a curvilinear transducer with both volumetric sweeps and still imaging techniques  FINDINGS: PANCREAS:  Portions of the pancreas are obscured by bowel gas  Visualized portions of the pancreas are unremarkable  AORTA AND IVC:  Visualized portions are normal for patient age  LIVER: Size:  Within normal range  The liver measures 15 6 cm in the midclavicular line  Contour:  Surface contour is smooth  Parenchyma:  Echogenicity and echotexture are within normal limits  There is a echogenic lesion in the right hepatic lobe measuring 1 4 x 1 x 0 7 cm, previously measuring 6 mm on prior MRI abdomen  Limited imaging of the main portal vein shows it to be patent and hepatopetal   BILIARY: No gallbladder findings  No intrahepatic biliary dilatation  CBD measures 3 0 mm  No choledocholithiasis  KIDNEY: Right kidney measures 10 8 x 4 7 x 5 8 cm  Volume 155 7 mL Kidney within normal limits  ASCITES:   None  Impression: Echogenic lesion in the right hepatic lobe likely representing a hemangioma  This lesion has Increased in size compared to prior MRI of the abdomen  Recommend repeat MRI of the abdomen with contrast for further evaluation  The study was marked in EPIC for significant notification  Workstation performed: [id number]     Holter monitor    Result Date: 2023  Narrative: PT NAME: Elizabeth Burleson : 1965  AGE: 56 y o  GENDER: female MRN: [id number]   PROCEDURE: Holter monitor INDICATIONS: palpitations     Impression: The patient had predominantly sinus rhythm  Heart rate varied from 45 bpm to 121 bpm   The patient’s average heart rate was 69 bpm   The patient had a holter monitor tracing done for 47 hours and 54 minutes  The patient had 6 PVCs   The patient had 115 supraventricular ectopic beats  There are 3 couplets  There are 3 runs, the longest consisting on 4 beats  The longest R/R interval was 1 7 seconds  No other major arrhythmia was noted

## 2023-05-11 DIAGNOSIS — E78.5 HYPERLIPIDEMIA, UNSPECIFIED HYPERLIPIDEMIA TYPE: ICD-10-CM

## 2023-05-11 RX ORDER — ATORVASTATIN CALCIUM 80 MG/1
TABLET, FILM COATED ORAL
Qty: 90 TABLET | Refills: 3 | Status: SHIPPED | OUTPATIENT
Start: 2023-05-11

## 2023-05-15 ENCOUNTER — TELEPHONE (OUTPATIENT)
Dept: OTHER | Facility: OTHER | Age: 58
End: 2023-05-15

## 2023-05-15 NOTE — TELEPHONE ENCOUNTER
Pt would like to make it aware when she is called with the time for her procedure, if she does not answer please leave a voicemail

## 2023-05-15 NOTE — TELEPHONE ENCOUNTER
I add a note about make sure if patient batista snot  phone please leave her a message with the time of procedure

## 2023-05-17 DIAGNOSIS — I10 ESSENTIAL HYPERTENSION: ICD-10-CM

## 2023-05-17 RX ORDER — AMLODIPINE BESYLATE 5 MG/1
TABLET ORAL
Qty: 90 TABLET | Refills: 3 | Status: SHIPPED | OUTPATIENT
Start: 2023-05-17

## 2023-05-23 ENCOUNTER — HOSPITAL ENCOUNTER (OUTPATIENT)
Dept: PERIOP | Facility: HOSPITAL | Age: 58
Setting detail: OUTPATIENT SURGERY
Discharge: HOME | End: 2023-05-23
Attending: STUDENT IN AN ORGANIZED HEALTH CARE EDUCATION/TRAINING PROGRAM

## 2023-05-23 ENCOUNTER — ANESTHESIA (OUTPATIENT)
Dept: PERIOP | Facility: HOSPITAL | Age: 58
End: 2023-05-23

## 2023-05-23 ENCOUNTER — ANESTHESIA EVENT (OUTPATIENT)
Dept: PERIOP | Facility: HOSPITAL | Age: 58
End: 2023-05-23

## 2023-05-23 VITALS
TEMPERATURE: 98.5 F | OXYGEN SATURATION: 98 % | DIASTOLIC BLOOD PRESSURE: 56 MMHG | SYSTOLIC BLOOD PRESSURE: 121 MMHG | RESPIRATION RATE: 18 BRPM | HEART RATE: 70 BPM

## 2023-05-23 DIAGNOSIS — R13.10 DYSPHAGIA, UNSPECIFIED TYPE: ICD-10-CM

## 2023-05-23 DIAGNOSIS — K52.9 ILEITIS: ICD-10-CM

## 2023-05-23 DIAGNOSIS — R09.89 CHRONIC THROAT CLEARING: ICD-10-CM

## 2023-05-23 DIAGNOSIS — R93.5 ABNORMAL MRI OF ABDOMEN: ICD-10-CM

## 2023-05-23 DIAGNOSIS — R10.13 EPIGASTRIC ABDOMINAL PAIN: ICD-10-CM

## 2023-05-23 RX ORDER — PROPOFOL 10 MG/ML
INJECTION, EMULSION INTRAVENOUS AS NEEDED
Status: DISCONTINUED | OUTPATIENT
Start: 2023-05-23 | End: 2023-05-23

## 2023-05-23 RX ORDER — PHENYLEPHRINE HCL IN 0.9% NACL 1 MG/10 ML
SYRINGE (ML) INTRAVENOUS AS NEEDED
Status: DISCONTINUED | OUTPATIENT
Start: 2023-05-23 | End: 2023-05-23

## 2023-05-23 RX ORDER — LIDOCAINE HYDROCHLORIDE 20 MG/ML
INJECTION, SOLUTION EPIDURAL; INFILTRATION; INTRACAUDAL; PERINEURAL AS NEEDED
Status: DISCONTINUED | OUTPATIENT
Start: 2023-05-23 | End: 2023-05-23

## 2023-05-23 RX ORDER — SODIUM CHLORIDE, SODIUM LACTATE, POTASSIUM CHLORIDE, CALCIUM CHLORIDE 600; 310; 30; 20 MG/100ML; MG/100ML; MG/100ML; MG/100ML
INJECTION, SOLUTION INTRAVENOUS CONTINUOUS PRN
Status: DISCONTINUED | OUTPATIENT
Start: 2023-05-23 | End: 2023-05-23

## 2023-05-23 RX ORDER — ONDANSETRON 2 MG/ML
4 INJECTION INTRAMUSCULAR; INTRAVENOUS ONCE AS NEEDED
Status: DISCONTINUED | OUTPATIENT
Start: 2023-05-23 | End: 2023-05-27 | Stop reason: HOSPADM

## 2023-05-23 RX ADMIN — PROPOFOL 50 MG: 10 INJECTION, EMULSION INTRAVENOUS at 11:36

## 2023-05-23 RX ADMIN — SODIUM CHLORIDE, SODIUM LACTATE, POTASSIUM CHLORIDE, AND CALCIUM CHLORIDE: .6; .31; .03; .02 INJECTION, SOLUTION INTRAVENOUS at 11:09

## 2023-05-23 RX ADMIN — LIDOCAINE HYDROCHLORIDE 100 MG: 20 INJECTION, SOLUTION EPIDURAL; INFILTRATION; INTRACAUDAL; PERINEURAL at 11:14

## 2023-05-23 RX ADMIN — PROPOFOL 150 MG: 10 INJECTION, EMULSION INTRAVENOUS at 11:14

## 2023-05-23 RX ADMIN — Medication 100 MCG: at 11:33

## 2023-05-23 RX ADMIN — Medication 200 MCG: at 11:38

## 2023-05-23 RX ADMIN — PROPOFOL 50 MG: 10 INJECTION, EMULSION INTRAVENOUS at 11:15

## 2023-05-23 RX ADMIN — PROPOFOL 50 MG: 10 INJECTION, EMULSION INTRAVENOUS at 11:26

## 2023-05-23 RX ADMIN — PROPOFOL 50 MG: 10 INJECTION, EMULSION INTRAVENOUS at 11:18

## 2023-05-23 RX ADMIN — Medication 100 MCG: at 11:17

## 2023-05-23 NOTE — ANESTHESIA POSTPROCEDURE EVALUATION
Post-Op Assessment Note    CV Status:  Stable  Pain Score: 0    Pain management: adequate     Mental Status:  Alert and awake   Hydration Status:  Euvolemic   PONV Controlled:  Controlled   Airway Patency:  Patent      Post Op Vitals Reviewed: Yes      Staff: CRNA         No notable events documented      BP   93/52   Temp     Pulse  68   Resp   19   SpO2   100

## 2023-05-23 NOTE — ANESTHESIA PREPROCEDURE EVALUATION
Procedure:  EGD  COLONOSCOPY    Relevant Problems   CARDIO   (+) Coronary artery disease involving native coronary artery of native heart with angina pectoris (HCC)   (+) HLD (hyperlipidemia)      GI/HEPATIC   (+) Gastroesophageal reflux disease      MUSCULOSKELETAL   (+) Low back pain   (+) Sciatica      NEURO/PSYCH   (+) Anxiety   (+) Status post non-ST elevation myocardial infarction (NSTEMI)   Interpretation Summary       •  Stress ECG: The ECG was not diagnostic due to pharmacological (vasodilator) stress  •  Perfusion: There are no perfusion defects  •  Stress Function: Left ventricular function post-stress is normal  Post-stress ejection fraction is 75 %      Normal Nuclear  No ischemia or scar  Physical Exam    Airway    Mallampati score: II  TM Distance: >3 FB  Neck ROM: full     Dental       Cardiovascular      Pulmonary      Other Findings        Anesthesia Plan  ASA Score- 2     Anesthesia Type- IV sedation with anesthesia with ASA Monitors  Additional Monitors:   Airway Plan:           Plan Factors-    Chart reviewed  Induction- intravenous  Postoperative Plan-     Informed Consent- Anesthetic plan and risks discussed with patient  I personally reviewed this patient with the CRNA  Discussed and agreed on the Anesthesia Plan with the CRNA  Kristian Mike

## 2023-05-23 NOTE — INTERVAL H&P NOTE
H&P reviewed  After examining the patient I find no changes in the patients condition since the H&P had been written      Vitals:    05/23/23 1030   BP: 128/62   Pulse: 73   Resp: 18   Temp: (!) 96 2 °F (35 7 °C)   SpO2: 98%

## 2023-05-30 ENCOUNTER — TELEPHONE (OUTPATIENT)
Dept: GASTROENTEROLOGY | Facility: CLINIC | Age: 58
End: 2023-05-30

## 2023-06-08 ENCOUNTER — TELEPHONE (OUTPATIENT)
Dept: FAMILY MEDICINE CLINIC | Facility: CLINIC | Age: 58
End: 2023-06-08

## 2023-06-09 ENCOUNTER — TELEPHONE (OUTPATIENT)
Dept: FAMILY MEDICINE CLINIC | Facility: CLINIC | Age: 58
End: 2023-06-09

## 2023-06-09 NOTE — TELEPHONE ENCOUNTER
Pt called, left message stating she was having issues with medications and needed appt with you  I called pt back x3 and left message for pt to call  I did explain that you were not in [place] today but Dalia is available or I can schedule her with a resident for next week  Just wanted to let you know incase pt makes complaint to you  Thank you

## 2023-06-14 ENCOUNTER — OFFICE VISIT (OUTPATIENT)
Dept: FAMILY MEDICINE CLINIC | Facility: CLINIC | Age: 58
End: 2023-06-14
Payer: COMMERCIAL

## 2023-06-14 VITALS
OXYGEN SATURATION: 98 % | WEIGHT: 138 LBS | HEART RATE: 73 BPM | BODY MASS INDEX: 25.4 KG/M2 | HEIGHT: 62 IN | DIASTOLIC BLOOD PRESSURE: 68 MMHG | SYSTOLIC BLOOD PRESSURE: 124 MMHG

## 2023-06-14 DIAGNOSIS — G89.29 CHRONIC LOW BACK PAIN WITH SCIATICA, SCIATICA LATERALITY UNSPECIFIED, UNSPECIFIED BACK PAIN LATERALITY: ICD-10-CM

## 2023-06-14 DIAGNOSIS — M54.40 CHRONIC LOW BACK PAIN WITH SCIATICA, SCIATICA LATERALITY UNSPECIFIED, UNSPECIFIED BACK PAIN LATERALITY: ICD-10-CM

## 2023-06-14 DIAGNOSIS — K50.90 CROHN'S DISEASE WITHOUT COMPLICATION, UNSPECIFIED GASTROINTESTINAL TRACT LOCATION (HCC): Primary | ICD-10-CM

## 2023-06-14 DIAGNOSIS — J30.2 SEASONAL ALLERGIES: ICD-10-CM

## 2023-06-14 DIAGNOSIS — I25.10 CORONARY ARTERY DISEASE INVOLVING NATIVE CORONARY ARTERY OF NATIVE HEART WITHOUT ANGINA PECTORIS: ICD-10-CM

## 2023-06-14 PROCEDURE — 99214 OFFICE O/P EST MOD 30 MIN: CPT

## 2023-06-14 RX ORDER — FEXOFENADINE HCL 180 MG/1
180 TABLET ORAL DAILY
Qty: 180 TABLET | Refills: 3 | Status: SHIPPED | OUTPATIENT
Start: 2023-06-14

## 2023-06-14 RX ORDER — FEXOFENADINE HCL 180 MG/1
180 TABLET ORAL DAILY
Qty: 30 TABLET | Refills: 3 | Status: CANCELLED | OUTPATIENT
Start: 2023-06-14

## 2023-06-14 RX ORDER — TRAMADOL HYDROCHLORIDE 50 MG/1
50 TABLET ORAL EVERY 6 HOURS PRN
Qty: 120 TABLET | Refills: 5 | Status: SHIPPED | OUTPATIENT
Start: 2023-06-14

## 2023-06-14 RX ORDER — NITROGLYCERIN 0.4 MG/1
0.4 TABLET SUBLINGUAL
Qty: 100 TABLET | Refills: 5 | Status: SHIPPED | OUTPATIENT
Start: 2023-06-14

## 2023-06-14 NOTE — PROGRESS NOTES
Assessment/Plan:    Seasonal Allergies  - Continue Allegra 180mg QD    Chronic low back pain with sciatica, sciatica laterality unspecified, unspecified back pain laterality  - Discontinue Meloxicam   - Begin Tramadol 50mg Q6H PRN for pain       Diagnoses and all orders for this visit:    Crohn's disease without complication, unspecified gastrointestinal tract location (HCC)  -     traMADol (Ultram) 50 mg tablet; Take 1 tablet (50 mg total) by mouth every 6 (six) hours as needed for moderate pain    Coronary artery disease involving native coronary artery of native heart without angina pectoris  -     nitroglycerin (NITROSTAT) 0 4 mg SL tablet; Place 1 tablet (0 4 mg total) under the tongue every 5 (five) minutes as needed for chest pain    Seasonal allergies  -     fexofenadine (ALLEGRA) 180 MG tablet; Take 1 tablet (180 mg total) by mouth daily    Chronic low back pain with sciatica, sciatica laterality unspecified, unspecified back pain laterality          Subjective:      Patient ID: Yancey Bernheim is a 56 y o  female  Ms Giuliano Mark is a 53yo F with a PMH significant for seasonal allergies, CAD, anxiety, GERD, and recently diagnosed Crohn's disease, here to review and refills her medications  The patient was previously prescribed Singulair for allergy symptoms, but reported GI side-effects including diarrhea, so she discontinued taking this medication  Meloxicam, which the patient was taking for chronic pain, was discontinued by a GI specialist the patient has been seeing for evaluation of inflammatory bowel symptoms, as they believed it may have been contributing  Based on recent sampling of the intestine, the patient has Crohn's disease, and we will prescribe tramadol instead of restarting an NSAID for management of pain symptoms, to avoid potentially worsening inflammatory bowel symptoms   We were also able to find a coupon for the patient to more comfortably afford Allegra, which has been managing the "patient allergy symptoms well, with only occasional rhinorrhea remaining  She is otherwise in her usual state of health  Declines breast CA screening, but is agreeable to cervical CA screening in our office  The following portions of the patient's history were reviewed and updated as appropriate: allergies, current medications, past family history, past medical history, past social history, past surgical history and problem list     Review of Systems   Constitutional: Negative for activity change, appetite change, chills, fatigue and fever  HENT: Positive for rhinorrhea  Negative for congestion, ear pain, sinus pressure, sinus pain, sneezing and sore throat  Eyes: Negative for pain and visual disturbance  Respiratory: Negative for cough and shortness of breath  Cardiovascular: Negative for chest pain and palpitations  Gastrointestinal: Negative for abdominal pain, blood in stool, constipation, diarrhea, nausea and vomiting  Genitourinary: Negative for dysuria and hematuria  Musculoskeletal: Positive for arthralgias and back pain  Negative for joint swelling  Skin: Negative for color change and rash  Neurological: Negative for dizziness, seizures, syncope and headaches  Psychiatric/Behavioral: Negative for agitation, confusion and sleep disturbance  All other systems reviewed and are negative  Objective:      /68   Pulse 73   Ht 5' 2\" (1 575 m)   Wt 62 6 kg (138 lb)   SpO2 98%   BMI 25 24 kg/m²          Physical Exam  Vitals and nursing note reviewed  Constitutional:       General: She is not in acute distress  Appearance: Normal appearance  She is not ill-appearing  HENT:      Head: Normocephalic and atraumatic  Right Ear: External ear normal       Left Ear: External ear normal       Nose: Nose normal  No congestion or rhinorrhea  Mouth/Throat:      Mouth: Mucous membranes are moist       Pharynx: Oropharynx is clear     Eyes:      Extraocular " Movements: Extraocular movements intact  Conjunctiva/sclera: Conjunctivae normal    Cardiovascular:      Rate and Rhythm: Normal rate and regular rhythm  Pulses: Normal pulses  Heart sounds: Normal heart sounds  No murmur heard  Pulmonary:      Effort: Pulmonary effort is normal       Breath sounds: Normal breath sounds  No wheezing  Abdominal:      General: Bowel sounds are normal       Palpations: Abdomen is soft  Tenderness: There is no abdominal tenderness  There is no guarding  Musculoskeletal:         General: No tenderness  Normal range of motion  Cervical back: Normal range of motion  No rigidity  Skin:     General: Skin is warm and dry  Findings: No erythema or rash  Neurological:      General: No focal deficit present  Mental Status: She is alert and oriented to person, place, and time     Psychiatric:         Mood and Affect: Mood normal          Behavior: Behavior normal

## 2023-06-21 ENCOUNTER — OFFICE VISIT (OUTPATIENT)
Dept: FAMILY MEDICINE CLINIC | Facility: CLINIC | Age: 58
End: 2023-06-21
Payer: COMMERCIAL

## 2023-06-21 VITALS
OXYGEN SATURATION: 98 % | BODY MASS INDEX: 25.4 KG/M2 | HEART RATE: 77 BPM | HEIGHT: 62 IN | SYSTOLIC BLOOD PRESSURE: 118 MMHG | DIASTOLIC BLOOD PRESSURE: 68 MMHG | WEIGHT: 138 LBS

## 2023-06-21 DIAGNOSIS — Z01.419 ENCOUNTER FOR GYNECOLOGICAL EXAMINATION WITHOUT ABNORMAL FINDING: Primary | ICD-10-CM

## 2023-06-21 DIAGNOSIS — Z12.4 CERVICAL CANCER SCREENING: ICD-10-CM

## 2023-06-21 PROCEDURE — G0145 SCR C/V CYTO,THINLAYER,RESCR: HCPCS

## 2023-06-21 PROCEDURE — 99214 OFFICE O/P EST MOD 30 MIN: CPT

## 2023-06-21 PROCEDURE — G0476 HPV COMBO ASSAY CA SCREEN: HCPCS

## 2023-06-21 NOTE — PROGRESS NOTES
BMI Counseling: Body mass index is 25 24 kg/m²  The BMI is above normal  Nutrition recommendations include encouraging healthy choices of fruits and vegetables, consuming healthier snacks and limiting drinks that contain sugar  Exercise recommendations include moderate physical activity 150 minutes/week  No pharmacotherapy was ordered  Rationale for BMI follow-up plan is due to patient being overweight or obese  ASSESSMENT & PLAN: Alexi Dupont is a 56 y o  No obstetric history on file  with normal gynecologic exam     1   Routine well woman exam done today  2  Pap and HPV:  The patient's last pap and hpv was 1997  It was normal     Pap was done today  Current ASCCP Guidelines reviewed  3   Mammogram declined  4  Colorectal cancer screening was not ordered  Done 5/23, due 5/33  5  The following were reviewed in today's visit: breast self exam, adequate intake of calcium and vitamin D, exercise and healthy diet    Will follow up with patient regarding results  General f/u in 3mo  CC:  Annual Gynecologic Examination    HPI: Alexi Dupont is a 56 y o  No obstetric history on file  who presents for annual gynecologic examination  She has the following concerns: no current concerns  [id number]  All between 1984-7  No complications of pregnancy or delivery  Tubal ligation in the late 0185O, no complications of procedure  Hasn't menstruated in over 5 years  Periods were infrequent prior to this  No remaining symptoms of menopause  Denies vaginal bleeding, discharge, discomfort, masses or skin changes  Patient declines mammogram referral today  Last done in 1990s, was told she has fibrous breast tissue  No reported changes since that time  Patient does self breast exams monthly, demonstrated correct method to me  We discussed what constitutes an abnormal finding, and she will RTO if she notices any changes  Health Maintenance:    She wears her seatbelt routinely      She does perform regular monthly self breast exams  She feels safe at home  Pap: 90s  Last mammogram: 90s  Last colonoscopy: 23    Past Medical History:   Diagnosis Date   • Angina pectoris ([facility] )    • Anxiety    • BMI 34 0-34 9,adult 3/29/2019   • GERD (gastroesophageal reflux disease)    • Myocardial infarction ([facility] 75 )     1/24/16   • NSTEMI (non-ST elevated myocardial infarction) ([place] [address] ) 1/24/2016    Overview:  Max trop I 1 77([facility]) w/nml LVEF and wall motion  • Shortness of breath        Past Surgical History:   Procedure Laterality Date   • CARDIAC SURGERY     • CORONARY ANGIOPLASTY WITH STENT PLACEMENT     • TOE SURGERY     • TUBAL LIGATION     • TUBAL LIGATION         Past OB/Gyn History:  OB History    No obstetric history on file  Pt does not have menstrual issues  History of sexually transmitted infection: No   History of abnormal pap smears: No      Patient is currently sexually active  heterosexual   The current method of family planning is tubal ligation and post menopausal status      Family History   Problem Relation Age of Onset   • Atrial fibrillation Mother    • Heart failure Mother    • Diabetes type II Mother    • Hypertension Mother    • Lung disease Mother         Pleural Effusion   • Hypertension Father        Social History:  Social History     Socioeconomic History   • Marital status:      Spouse name: Not on file   • Number of children: Not on file   • Years of education: Not on file   • Highest education level: Not on file   Occupational History   • Not on file   Tobacco Use   • Smoking status: Former     Types: Cigarettes   • Smokeless tobacco: Never   Vaping Use   • Vaping Use: Every day   • Substances: Nicotine, Flavoring   Substance and Sexual Activity   • Alcohol use: No   • Drug use: No   • Sexual activity: Not on file   Other Topics Concern   • Not on file   Social History Narrative   • Not on file     Social Determinants of Health     Financial Resource Strain: Medium Risk (11/10/2022) Overall Financial Resource Strain (CARDIA)    • Difficulty of Paying Living Expenses: Somewhat hard   Food Insecurity: Not on file   Transportation Needs: No Transportation Needs (11/10/2022)    PRAPARE - Transportation    • Lack of Transportation (Medical): No    • Lack of Transportation (Non-Medical): No   Physical Activity: Not on file   Stress: Not on file   Social Connections: Not on file   Intimate Partner Violence: Not on file   Housing Stability: Not on file     Presently lives with Male partner, not sexually active    Patient is currently disabled   No Known Allergies    Current Outpatient Medications:   •  ALPRAZolam (XANAX) 1 mg tablet, Take 1 tablet (1 mg total) by mouth 3 (three) times a day as needed for anxiety, Disp: 270 tablet, Rfl: 3  •  amLODIPine (NORVASC) 5 mg tablet, take 1 tablet by mouth once daily, Disp: 90 tablet, Rfl: 3  •  atorvastatin (LIPITOR) 80 mg tablet, take 1 tablet by mouth once daily, Disp: 90 tablet, Rfl: 3  •  fexofenadine (ALLEGRA) 180 MG tablet, Take 1 tablet (180 mg total) by mouth daily, Disp: 180 tablet, Rfl: 3  •  metoprolol tartrate (LOPRESSOR) 50 mg tablet, take 1 tablet by mouth twice a day, Disp: 180 tablet, Rfl: 3  •  montelukast (SINGULAIR) 10 mg tablet, Take 1 tablet (10 mg total) by mouth daily at bedtime, Disp: 90 tablet, Rfl: 3  •  nitroglycerin (NITROSTAT) 0 4 mg SL tablet, Place 1 tablet (0 4 mg total) under the tongue every 5 (five) minutes as needed for chest pain, Disp: 100 tablet, Rfl: 5  •  Omega-3 Fatty Acids (fish oil) 1,000 mg, Take 1,000 mg by mouth daily (Patient not taking: Reported on 12/1/2022), Disp: , Rfl:   •  omeprazole (PriLOSEC) 20 mg delayed release capsule, Take 1 capsule (20 mg total) by mouth daily, Disp: 30 capsule, Rfl: 11  •  polyethylene glycol (COLYTE) 4000 mL solution, Take 4,000 mL by mouth once for 1 dose, Disp: 4000 mL, Rfl: 0  •  traMADol (Ultram) 50 mg tablet, Take 1 tablet (50 mg total) by mouth every 6 (six) hours as needed for moderate pain, Disp: 120 tablet, Rfl: 5  •  traZODone (DESYREL) 100 mg tablet, Take 1 tablet (100 mg total) by mouth daily at bedtime, Disp: 90 tablet, Rfl: 3      Review of Systems  Constitutional :no fever, feels well, no tiredness, no recent weight gain or loss  ENT: no ear ache, no loss of hearing, no nosebleeds or nasal discharge, no sore throat or hoarseness  Cardiovascular: no complaints of slow or fast heart beat, no chest pain, no palpitations, no leg claudication or lower extremity edema  Respiratory: no complaints of shortness of shortness of breath, no CORBETT  Breasts:no complaints of breast pain, breast lump, or nipple discharge  Gastrointestinal: no complaints of abdominal pain, constipation, nausea, vomiting, or diarrhea or bloody stools  Genitourinary : no complaints of dysuria, incontinence, pelvic pain, no dysmenorrhea, vaginal discharge or abnormal vaginal bleeding and as noted in HPI  Musculoskeletal: no complaints of arthralgia, no myalgia, no joint swelling or stiffness, no limb pain or swelling  Integumentary: no complaints of skin rash or lesion, itching or dry skin  Neurological: no complaints of headache, no confusion, no numbness or tingling, no dizziness or fainting    Objective      There were no vitals taken for this visit  General:   appears stated age, cooperative, alert normal mood and affect   Neck: normal, supple,trachea midline, no masses   Heart: regular rate and rhythm, S1, S2 normal, no murmur, click, rub or gallop   Lungs: clear to auscultation bilaterally   Breasts: normal appearance, no masses or tenderness, Inspection negative, No nipple retraction or dimpling, No nipple discharge or bleeding, No axillary or supraclavicular adenopathy, Normal to palpation without dominant masses, normal findings: Some density appreciated bilaterally in lower L quadrant of R breast, lower R quadrant of L breast, against chest wall near mediastinal borders   Nml per patient Abdomen: soft, non-tender, without masses or organomegaly   Vulva: normal   Vagina: normal vagina, no discharge, exudate, lesion, or erythema   Urethra: normal   Cervix: Normal, no discharge  PAP done  Nontender  Uterus: normal size, contour, position, consistency, mobility, non-tender   Adnexa: no mass, fullness, tenderness   Lymphatic palpation of lymph nodes in neck, axilla, groin and/or other locations: no lymphadenopathy or masses noted   Skin normal skin turgor and no rashes     Psychiatric orientation to person, place, and time: normal  mood and affect: normal

## 2023-06-21 NOTE — ASSESSMENT & PLAN NOTE
Last Pap was 1990s, normal at that time  No period in 5+ years  No menopausal symptoms  No bleeding, discharge, odor, pain, itching, skin changes  Exam today w/o abnml findings, pap obtained  Will f/u with results

## 2023-06-21 NOTE — PROGRESS NOTES
ASSESSMENT & PLAN: Erica Carrington is a 56 y o  No obstetric history on file  with normal gynecologic exam     1   Routine well woman exam done today  2  Pap and HPV:  The patient's last pap and hpv was 1997  It was normal     Pap with cotesting was done today  Current ASCCP Guidelines reviewed  3   Mammogram ordered  4  Colorectal cancer screening was not ordered  5  The following were reviewed in today's visit: breast self exam, adequate intake of calcium and vitamin D, exercise and healthy diet  6  ***    CC:  Annual Gynecologic Examination    HPI: Erica Carrington is a 56 y o  No obstetric history on file  who presents for annual gynecologic examination  She has the following concerns:  ***  2 kids, 1 miscarriage 86/87    Health Maintenance:    She wears her seatbelt routinely  She does perform regular monthly self breast exams  She feels safe at home  Pap: ***  Last mammogram: 90s  Last colonoscopy: 23    Past Medical History:   Diagnosis Date   • Angina pectoris ([facility] )    • Anxiety    • BMI 34 0-34 9,adult 3/29/2019   • GERD (gastroesophageal reflux disease)    • Myocardial infarction ([facility] 75 )     1/24/16   • NSTEMI (non-ST elevated myocardial infarction) ([facility] 75 ) 1/24/2016    Overview:  Max trop I 1 77([facility]) w/nml LVEF and wall motion  • Shortness of breath        Past Surgical History:   Procedure Laterality Date   • CARDIAC SURGERY     • CORONARY ANGIOPLASTY WITH STENT PLACEMENT     • TOE SURGERY     • TUBAL LIGATION     • TUBAL LIGATION         Past OB/Gyn History:  OB History    No obstetric history on file  Pt does not have menstrual issues  ***   History of sexually transmitted infection: No   History of abnormal pap smears: No      Patient is currently sexually active  heterosexual   The current method of family planning is tubal ligation and post menopausal status      Family History   Problem Relation Age of Onset   • Atrial fibrillation Mother    • Heart failure Mother    • Diabetes type II Mother    • Hypertension Mother    • Lung disease Mother         Pleural Effusion   • Hypertension Father        Social History:  Social History     Socioeconomic History   • Marital status:      Spouse name: Not on file   • Number of children: Not on file   • Years of education: Not on file   • Highest education level: Not on file   Occupational History   • Not on file   Tobacco Use   • Smoking status: Former     Types: Cigarettes   • Smokeless tobacco: Never   Vaping Use   • Vaping Use: Every day   • Substances: Nicotine, Flavoring   Substance and Sexual Activity   • Alcohol use: No   • Drug use: No   • Sexual activity: Not on file   Other Topics Concern   • Not on file   Social History Narrative   • Not on file     Social Determinants of Health     Financial Resource Strain: Medium Risk (11/10/2022)    Overall Financial Resource Strain (CARDIA)    • Difficulty of Paying Living Expenses: Somewhat hard   Food Insecurity: Not on file   Transportation Needs: No Transportation Needs (11/10/2022)    PRAPARE - Transportation    • Lack of Transportation (Medical): No    • Lack of Transportation (Non-Medical): No   Physical Activity: Not on file   Stress: Not on file   Social Connections: Not on file   Intimate Partner Violence: Not on file   Housing Stability: Not on file     Presently lives with ***    Patient is currently disabled ***  No Known Allergies    Current Outpatient Medications:   •  ALPRAZolam (XANAX) 1 mg tablet, Take 1 tablet (1 mg total) by mouth 3 (three) times a day as needed for anxiety, Disp: 270 tablet, Rfl: 3  •  amLODIPine (NORVASC) 5 mg tablet, take 1 tablet by mouth once daily, Disp: 90 tablet, Rfl: 3  •  atorvastatin (LIPITOR) 80 mg tablet, take 1 tablet by mouth once daily, Disp: 90 tablet, Rfl: 3  •  fexofenadine (ALLEGRA) 180 MG tablet, Take 1 tablet (180 mg total) by mouth daily, Disp: 180 tablet, Rfl: 3  •  metoprolol tartrate (LOPRESSOR) 50 mg tablet, take 1 tablet by mouth twice a day, Disp: 180 tablet, Rfl: 3  •  montelukast (SINGULAIR) 10 mg tablet, Take 1 tablet (10 mg total) by mouth daily at bedtime, Disp: 90 tablet, Rfl: 3  •  nitroglycerin (NITROSTAT) 0 4 mg SL tablet, Place 1 tablet (0 4 mg total) under the tongue every 5 (five) minutes as needed for chest pain, Disp: 100 tablet, Rfl: 5  •  Omega-3 Fatty Acids (fish oil) 1,000 mg, Take 1,000 mg by mouth daily (Patient not taking: Reported on 12/1/2022), Disp: , Rfl:   •  omeprazole (PriLOSEC) 20 mg delayed release capsule, Take 1 capsule (20 mg total) by mouth daily, Disp: 30 capsule, Rfl: 11  •  polyethylene glycol (COLYTE) 4000 mL solution, Take 4,000 mL by mouth once for 1 dose, Disp: 4000 mL, Rfl: 0  •  traMADol (Ultram) 50 mg tablet, Take 1 tablet (50 mg total) by mouth every 6 (six) hours as needed for moderate pain, Disp: 120 tablet, Rfl: 5  •  traZODone (DESYREL) 100 mg tablet, Take 1 tablet (100 mg total) by mouth daily at bedtime, Disp: 90 tablet, Rfl: 3      Review of Systems  Constitutional :no fever, feels well, no tiredness, no recent weight gain or loss  ENT: no ear ache, no loss of hearing, no nosebleeds or nasal discharge, no sore throat or hoarseness  Cardiovascular: no complaints of slow or fast heart beat, no chest pain, no palpitations, no leg claudication or lower extremity edema  Respiratory: no complaints of shortness of shortness of breath, no CORBETT  Breasts:no complaints of breast pain, breast lump, or nipple discharge  Gastrointestinal: no complaints of abdominal pain, constipation, nausea, vomiting, or diarrhea or bloody stools  Genitourinary : no complaints of dysuria, incontinence, pelvic pain, no dysmenorrhea, vaginal discharge or abnormal vaginal bleeding and as noted in HPI  Musculoskeletal: no complaints of arthralgia, no myalgia, no joint swelling or stiffness, no limb pain or swelling    Integumentary: no complaints of skin rash or lesion, itching or dry skin  Neurological: no "complaints of headache, no confusion, no numbness or tingling, no dizziness or fainting    Objective      There were no vitals taken for this visit  General:   appears stated age, cooperative, alert normal mood and affect   Neck: normal, supple,trachea midline, no masses   Heart: regular rate and rhythm, S1, S2 normal, no murmur, click, rub or gallop   Lungs: clear to auscultation bilaterally   Breasts: normal appearance, no masses or tenderness, Inspection negative, No nipple retraction or dimpling, No nipple discharge or bleeding, No axillary or supraclavicular adenopathy, Normal to palpation without dominant masses, normal findings: ***   Abdomen: soft, non-tender, without masses or organomegaly   Vulva: normal   Vagina: {exam; vagina:16528}   Urethra: {Urethra:15035}   Cervix: {PE Cervix:42970::\"Normal, no discharge  \"}   Uterus: {exam; uterus:68621}   Adnexa: {exam; adnexa:48305}   Lymphatic palpation of lymph nodes in neck, axilla, groin and/or other locations: no lymphadenopathy or masses noted   Skin normal skin turgor and no rashes     Psychiatric orientation to person, place, and time: normal  mood and affect: normal           "

## 2023-06-26 LAB
HPV HR 12 DNA CVX QL NAA+PROBE: NEGATIVE
HPV16 DNA CVX QL NAA+PROBE: NEGATIVE
HPV18 DNA CVX QL NAA+PROBE: NEGATIVE

## 2023-06-28 LAB
LAB AP GYN PRIMARY INTERPRETATION: NORMAL
Lab: NORMAL

## 2023-07-06 DIAGNOSIS — F41.9 ANXIETY: ICD-10-CM

## 2023-07-06 RX ORDER — ALPRAZOLAM 1 MG/1
TABLET ORAL
Qty: 270 TABLET | Refills: 3 | Status: SHIPPED | OUTPATIENT
Start: 2023-07-06

## 2023-07-11 ENCOUNTER — VBI (OUTPATIENT)
Dept: ADMINISTRATIVE | Facility: OTHER | Age: 58
End: 2023-07-11

## 2023-08-18 ENCOUNTER — OFFICE VISIT (OUTPATIENT)
Dept: GASTROENTEROLOGY | Facility: CLINIC | Age: 58
End: 2023-08-18
Payer: COMMERCIAL

## 2023-08-18 ENCOUNTER — APPOINTMENT (OUTPATIENT)
Dept: LAB | Facility: HOSPITAL | Age: 58
End: 2023-08-18
Payer: COMMERCIAL

## 2023-08-18 VITALS
TEMPERATURE: 97.4 F | HEART RATE: 62 BPM | HEIGHT: 62 IN | WEIGHT: 136.8 LBS | BODY MASS INDEX: 25.17 KG/M2 | SYSTOLIC BLOOD PRESSURE: 100 MMHG | OXYGEN SATURATION: 99 % | DIASTOLIC BLOOD PRESSURE: 70 MMHG

## 2023-08-18 DIAGNOSIS — K50.90 CROHN'S DISEASE WITHOUT COMPLICATION, UNSPECIFIED GASTROINTESTINAL TRACT LOCATION (HCC): Primary | ICD-10-CM

## 2023-08-18 DIAGNOSIS — K52.9 INFLAMMATORY BOWEL DISEASE: ICD-10-CM

## 2023-08-18 DIAGNOSIS — R10.13 EPIGASTRIC ABDOMINAL PAIN: ICD-10-CM

## 2023-08-18 LAB
ALBUMIN SERPL BCP-MCNC: 4.6 G/DL (ref 3.5–5)
ALP SERPL-CCNC: 97 U/L (ref 34–104)
ALT SERPL W P-5'-P-CCNC: 13 U/L (ref 7–52)
ANION GAP SERPL CALCULATED.3IONS-SCNC: 8 MMOL/L
AST SERPL W P-5'-P-CCNC: 18 U/L (ref 13–39)
BASOPHILS # BLD AUTO: 0.11 THOUSANDS/ÂΜL (ref 0–0.1)
BASOPHILS NFR BLD AUTO: 1 % (ref 0–1)
BILIRUB SERPL-MCNC: 0.72 MG/DL (ref 0.2–1)
BUN SERPL-MCNC: 14 MG/DL (ref 5–25)
CALCIUM SERPL-MCNC: 9.8 MG/DL (ref 8.4–10.2)
CHLORIDE SERPL-SCNC: 99 MMOL/L (ref 96–108)
CO2 SERPL-SCNC: 31 MMOL/L (ref 21–32)
CREAT SERPL-MCNC: 0.89 MG/DL (ref 0.6–1.3)
CRP SERPL QL: 3 MG/L
EOSINOPHIL # BLD AUTO: 0.21 THOUSAND/ÂΜL (ref 0–0.61)
EOSINOPHIL NFR BLD AUTO: 2 % (ref 0–6)
ERYTHROCYTE [DISTWIDTH] IN BLOOD BY AUTOMATED COUNT: 12.1 % (ref 11.6–15.1)
GFR SERPL CREATININE-BSD FRML MDRD: 72 ML/MIN/1.73SQ M
GLUCOSE SERPL-MCNC: 88 MG/DL (ref 65–140)
HCT VFR BLD AUTO: 46.6 % (ref 34.8–46.1)
HGB BLD-MCNC: 15.5 G/DL (ref 11.5–15.4)
IMM GRANULOCYTES # BLD AUTO: 0.02 THOUSAND/UL (ref 0–0.2)
IMM GRANULOCYTES NFR BLD AUTO: 0 % (ref 0–2)
LYMPHOCYTES # BLD AUTO: 2.96 THOUSANDS/ÂΜL (ref 0.6–4.47)
LYMPHOCYTES NFR BLD AUTO: 33 % (ref 14–44)
MCH RBC QN AUTO: 29.5 PG (ref 26.8–34.3)
MCHC RBC AUTO-ENTMCNC: 33.3 G/DL (ref 31.4–37.4)
MCV RBC AUTO: 89 FL (ref 82–98)
MONOCYTES # BLD AUTO: 0.49 THOUSAND/ÂΜL (ref 0.17–1.22)
MONOCYTES NFR BLD AUTO: 6 % (ref 4–12)
NEUTROPHILS # BLD AUTO: 5.08 THOUSANDS/ÂΜL (ref 1.85–7.62)
NEUTS SEG NFR BLD AUTO: 58 % (ref 43–75)
NRBC BLD AUTO-RTO: 0 /100 WBCS
PLATELET # BLD AUTO: 283 THOUSANDS/UL (ref 149–390)
PMV BLD AUTO: 9 FL (ref 8.9–12.7)
POTASSIUM SERPL-SCNC: 4 MMOL/L (ref 3.5–5.3)
PROT SERPL-MCNC: 7.3 G/DL (ref 6.4–8.4)
RBC # BLD AUTO: 5.26 MILLION/UL (ref 3.81–5.12)
SODIUM SERPL-SCNC: 138 MMOL/L (ref 135–147)
WBC # BLD AUTO: 8.87 THOUSAND/UL (ref 4.31–10.16)

## 2023-08-18 PROCEDURE — 85025 COMPLETE CBC W/AUTO DIFF WBC: CPT

## 2023-08-18 PROCEDURE — 36415 COLL VENOUS BLD VENIPUNCTURE: CPT

## 2023-08-18 PROCEDURE — 86480 TB TEST CELL IMMUN MEASURE: CPT

## 2023-08-18 PROCEDURE — 86140 C-REACTIVE PROTEIN: CPT

## 2023-08-18 PROCEDURE — 99214 OFFICE O/P EST MOD 30 MIN: CPT | Performed by: PHYSICIAN ASSISTANT

## 2023-08-18 PROCEDURE — 87340 HEPATITIS B SURFACE AG IA: CPT

## 2023-08-18 PROCEDURE — 80053 COMPREHEN METABOLIC PANEL: CPT

## 2023-08-18 PROCEDURE — 83036 HEMOGLOBIN GLYCOSYLATED A1C: CPT

## 2023-08-18 PROCEDURE — 86704 HEP B CORE ANTIBODY TOTAL: CPT

## 2023-08-18 PROCEDURE — 86706 HEP B SURFACE ANTIBODY: CPT

## 2023-08-18 NOTE — PROGRESS NOTES
[facility] - Outpatient Follow-up Note  Yair Rawls 66 y.o. female MRN: [id number]  Encounter: [id number]    ASSESSMENT AND PLAN:      1. Crohn's disease without complication, unspecified gastrointestinal tract location [facility])    Patient underwent colonoscopy after MRI of the abdomen commented on ileitis. Colonoscopy demonstrated moderately scarred and ulcerated mucosa in the TI, which was not able to be traversed, biopsies noted acutely inflamed granulation tissue and inflammatory debris. Pathology was reviewed with Dr. Vicente Hanley, this is concerning/consistent with new diagnosis of Crohn's disease. We reviewed pathology results and diagnosis in detail today. We reviewed potential sequelae of untreated inflammatory bowel disease. Dr. Vicente Hanley recommends initiation of Stelara/ustekinumab. We will order the pre-biologic blood work now. We will also obtain MRE for additional evaluation of small bowel IBD. We reviewed recommendations for dietary intake at this time. While she has never had an obstruction in the past, we did review that persistent inflammation in this area may result in such. Recommend cessation of vaping nicotine. We also reviewed healthcare maintenance recommendations in brief today. Once on biologic therapy, patient should have repeat colonoscopy in approximately 1 year to assess response to treatment, though definitive recommendations deferred to endoscopist.    Health Maintenance Recommendations:  Vaccines & Infections  · COVID-19 vaccination is recommended when eligible. There is no evidence that the COVID-19 vaccine would cause an IBD flare. · Avoid live vaccines if on immunosuppressive therapy. · Yearly influenza vaccine (flu shot). · Pneumonia vaccines. These include Prevnar 13, followed by Pneumovax at least 8 weeks later. A Pneumovax booster should be given 5 years after. · Shingrix vaccine - series of 2 injections.   · If not immune to measles mumps or rubella, MMR vaccine is recommended. However, this is a live vaccine and should be given prior to immunosuppressive therapy. · HPV vaccination as per national guidelines. · Hepatitis A and B vaccinations if not previously vaccinated. · Testing for tuberculosis with QuantiFERON Gold blood test and/or chest xray prior to starting immunosuppressive medications, and then annually    Cancer screening  · Dysplasia surveillance for colorectal cancer. Colonoscopy in all patients with extensive colitis (more than 1/3 of the colon involved) who had disease for at least 8 or more years. Repeat colonoscopy approximately every 12-24 months. In patients with concurrent primary sclerosing cholangitis, history of dysplasia, or family history of colon cancer, repeat colonoscopy annually. · Females: Pap smear annually, especially for woman on immunosuppression. · Annual dermatologic/skin exam in all patients with IBD, especially those on immunosuppression including anti-TNF therapy and/or thiopurines. Miscellaneous  · DEXA scan, once off steroids for 3 months  · Depression screening recommended annually  · Routine dental and ophthalmology examinations    - C-reactive protein; Future  - Hepatitis B core antibody, total; Future  - Hepatitis B surface antibody; Future  - Hepatitis B surface antigen; Future  - Quantiferon TB Gold Plus; Future  - CBC and differential; Future  - Comprehensive metabolic panel; Future  - Hemoglobin A1C; Future  - MRI enterography w wo; Future    2. Epigastric abdominal pain  3. Dysphagia     Patient symptoms markedly improved on daily PPI. No  significant pathology noted on upper endoscopy. Continue with diet and lifestyle modifications for GERD. No evidence of endoscopic pathology to account for dysphagia, biopsies negative for EOE. May be multifactorial in the setting of dentition difficulties.   Consider video swallow with SLP in the future if symptoms persist.    We will have patient follow-up with Dr. Basilio Hays in approximately 3 months.  ______________________________________________________________________    SUBJECTIVE: Patient is a 66 y.o. female who presents today for follow-up regarding EGD/colon. Pmhx sig for CAD, GERD, HTN, HLD, anxiety. She was initially evaluated in 05/2023 for complaints of dysphagia and epigastric pain. She noted edentulous state which may be contributing to her dysphagia. She was restarted on her PPI for the symptoms. She also had an MRI completed in 04/2023 for a liver lesion, which incidentally commented on hyperenhancement of the wall of the TI suggestive of ileitis. She underwent bidirectional scopic evaluation. EGD was macroscopically unremarkable with biopsies negative for EOE. Colonoscopy demonstrated moderate generalized scarred and ulcerated mucosa in the TI, and biopsies noted acutely inflamed granulation tissue and inflammatory debris. 08/18/23:     Patient shares that she is having approximately 1 bowel movement daily. This is between a [place] 3 and 4. She denies any significant abdominal pain related to defecation. No BRBPR or melena. No chronic diarrhea or nocturnal BMs. She notes intermittent diarrhea following certain oral intake, and at times there is abdominal cramping related to this. She denies any family history of IBD. She shares she has lost greater than 30 to 40 pounds over the past year, though this was intentional as she went on a diet. She denies any fevers, chills, night sweats. Patient denies any significant heartburn or indigestion at this time. Her epigastric pain has improved on PPI. Still having some issues with dysphagia, attributes this to difficulty chewing. NSAIDs: avoids   Tobacco: vapes nicotine   Etoh: none      04/2023: MRI: No suspicious liver mass. Stable 6 mm posterior right hepatic lobe lesion.  Although definitive diagnosis is limited due to small size, this is probably a tiny flash filling hemangioma, not significantly changed in size or appearance from 2019. Hyperenhancement of the wall of the terminal ileum without evidence for stricture suggestive of ileitis. Multiple prominent right lower quadrant lymph nodes, likely reactive. Correlate for history of inflammatory bowel disease. Endoscopic history:   EGD: 05/2023: normal   A. Stomach, cold fcp bx r/o h. pylori: Gastric antral mucosa with mild chronic, inactive gastritis. Negative for intestinal metaplasia, dysplasia or carcinoma. No Helicobacter pylori is identified on H&E stained slides. B. Esophagus, cold fcp bx proximal r/o EOE: Esophageal squamous mucosa with no significant pathologic alteration. No increase in intraepithelial eosinophils is seen  Colon:05/2023: Moderate, generalized scarred and ulcerated mucosa in the terminal ileum; Few small diverticula in the sigmoid colon  C. "Terminal Ileum", cold fcp bx r/o crohn's: Colonic mucosa with marked denudation and reactive changes. Acutely inflamed granulation tissue and inflammatory debris, consistent with ulcer bed. No histologic evidence of chronic mucosal injury. Negative for dysplasia  E. Large Intestine, Cecum, cold fcp bx r/o Crohn's: Benign colonic mucosa with no pathologic abnormality; Negative for active and chronic colitis. Negative for dysplasia. F. Large Intestine, Right/Ascending Colon, cold fcp bx r/o Crohn's: Benign colonic mucosa with no pathologic abnormality; Negative for active and chronic colitis. Negative for dysplasia. G. Large Intestine, Transverse Colon, cold fcp bx r/o Crohn's: Benign colonic mucosa with no pathologic abnormality; Negative for active and chronic colitis. Negative for dysplasia. H. Large Intestine, Left/Descending Colon, cold fcp bx r/o Crohn's: Benign colonic mucosa with no pathologic abnormality; Negative for active and chronic colitis. Negative for dysplasia.    I. Large Intestine, Sigmoid Colon, cold fcp bx r/o Crohn's: Benign colonic mucosa with no pathologic abnormality; Negative for active and chronic colitis. Negative for dysplasia. J. Rectum, cold fcp bx r/o Crohn's: Benign colonic mucosa with no pathologic abnormality; Negative for active and chronic colitis. Negative for dysplasia    Review of Systems   Otherwise Per HPI    Historical Information   Past Medical History:   Diagnosis Date   • Angina pectoris ([address])    • Anxiety    • BMI 34.0-34.9,adult 3/29/2019   • GERD (gastroesophageal reflux disease)    • Myocardial infarction ([address])     1/24/16   • NSTEMI (non-ST elevated myocardial infarction) ([address]) 1/24/2016    Overview:  Max trop I 1.77([facility]) w/nml LVEF and wall motion.     • Shortness of breath      Past Surgical History:   Procedure Laterality Date   • CARDIAC SURGERY     • CORONARY ANGIOPLASTY WITH STENT PLACEMENT     • TOE SURGERY     • TUBAL LIGATION     • TUBAL LIGATION       Social History   Social History     Substance and Sexual Activity   Alcohol Use No     Social History     Substance and Sexual Activity   Drug Use No     Social History     Tobacco Use   Smoking Status Former   • Types: Cigarettes   Smokeless Tobacco Never     Family History   Problem Relation Age of Onset   • Atrial fibrillation Mother    • Heart failure Mother    • Diabetes type II Mother    • Hypertension Mother    • Lung disease Mother         Pleural Effusion   • Hypertension Father        Meds/Allergies       Current Outpatient Medications:   •  ALPRAZolam (XANAX) 1 mg tablet  •  amLODIPine (NORVASC) 5 mg tablet  •  atorvastatin (LIPITOR) 80 mg tablet  •  metoprolol tartrate (LOPRESSOR) 50 mg tablet  •  montelukast (SINGULAIR) 10 mg tablet  •  nitroglycerin (NITROSTAT) 0.4 mg SL tablet  •  omeprazole (PriLOSEC) 20 mg delayed release capsule  •  traMADol (Ultram) 50 mg tablet  •  traZODone (DESYREL) 100 mg tablet  •  fexofenadine (ALLEGRA) 180 MG tablet  •  Omega-3 Fatty Acids (fish oil) 1,000 mg  •  polyethylene glycol (COLYTE) 4000 mL solution    No Known Allergies        Objective     Blood pressure 100/70, pulse 62, temperature (!) 97.4 °F (36.3 °C), temperature source Tympanic, height 5' 2" (1.575 m), weight 62.1 kg (136 lb 12.8 oz), SpO2 99 %. Body mass index is 25.02 kg/m². Physical Exam  Vitals and nursing note reviewed. Constitutional:       Appearance: Normal appearance. HENT:      Head: Normocephalic and atraumatic. Eyes:      General: No scleral icterus. Conjunctiva/sclera: Conjunctivae normal.   Cardiovascular:      Rate and Rhythm: Normal rate. Pulmonary:      Effort: Pulmonary effort is normal. No respiratory distress. Skin:     General: Skin is warm and dry. Coloration: Skin is not jaundiced. Neurological:      General: No focal deficit present. Mental Status: She is alert and oriented to person, place, and time. Psychiatric:         Mood and Affect: Mood normal.         Behavior: Behavior normal.         Lab Results:   No visits with results within 1 Day(s) from this visit. Latest known visit with results is:   Office Visit on 06/21/2023   Component Date Value   • Case Report 06/21/2023                      Value:Gynecologic Cytology Report                       Case: [id number]                                  Authorizing Provider:  Kayleigh Boggs MD Collected:           06/21/2023 0935              Ordering Location:     [address] [place] [address]   Received:            06/21/2023 0935                                     [facility]                                                              First Screen:          OMAR Hernandez                                                    Specimen:    LIQUID-BASED PAP, SCREENING, Cervix                                                       • Primary Interpretation 06/21/2023 Negative for intraepithelial lesion or malignancy    • Specimen Adequacy 06/21/2023 Satisfactory for evaluation.  Endocervical/transformation zone component present. • Additional Information 06/21/2023                      Value: This result contains rich text formatting which cannot be displayed here. • HPV Other HR 06/21/2023 Negative    • HPV16 06/21/2023 Negative    • HPV18 06/21/2023 Negative      Radiology Results:   No results found. Edward Marquez PA-C    **Please note:  Dictation voice to text software may have been used in the creation of this record. Occasional wrong word or “sound alike” substitutions may have occurred due to the inherent limitations of voice recognition software. Read the chart carefully and recognize, using context, where substitutions have occurred. **

## 2023-08-18 NOTE — PATIENT INSTRUCTIONS
Your colonoscopy and biopsies show Crohn's disease. There is inflammation and thickening at the connection between the end of the small intestine and the start of the colon. Dr. Deirdre Adames wants to start you on a medication called Stelara so this area does not get inflamed out of control and cause complications like an obstruction or an abscess or abnormal connections between pieces of bowel. We have ordered blood work for you to have completed now. We will also check something called an MR enterography which is the best way to look at the rest of the small bowel. Pending the results we will get everything started for you to get this medication started.     We will continue at every office visit to review healthcare maintenance, though we want to make sure you are up-to-date on all cancer screening, vaccines, etc.

## 2023-08-19 LAB
EST. AVERAGE GLUCOSE BLD GHB EST-MCNC: 108 MG/DL
HBA1C MFR BLD: 5.4 %
HBV CORE AB SER QL: NORMAL
HBV SURFACE AB SER-ACNC: 431 MIU/ML
HBV SURFACE AG SER QL: NORMAL

## 2023-08-22 ENCOUNTER — TELEPHONE (OUTPATIENT)
Dept: GASTROENTEROLOGY | Facility: CLINIC | Age: 58
End: 2023-08-22

## 2023-08-22 LAB
GAMMA INTERFERON BACKGROUND BLD IA-ACNC: 0.03 IU/ML
M TB IFN-G BLD-IMP: NEGATIVE
M TB IFN-G CD4+ BCKGRND COR BLD-ACNC: 0 IU/ML
M TB IFN-G CD4+ BCKGRND COR BLD-ACNC: 0 IU/ML
MITOGEN IGNF BCKGRD COR BLD-ACNC: >10 IU/ML

## 2023-08-23 NOTE — TELEPHONE ENCOUNTER
Pt with new diagnosis of Crohn's disease. Pre-biologic lab work has been completed. MRE orderd. Per Dr. Callie Jackson we will start pt on Bert.

## 2023-08-28 DIAGNOSIS — K50.90 CROHN'S DISEASE WITHOUT COMPLICATION, UNSPECIFIED GASTROINTESTINAL TRACT LOCATION (HCC): Primary | ICD-10-CM

## 2023-08-28 RX ORDER — SODIUM CHLORIDE 9 MG/ML
20 INJECTION, SOLUTION INTRAVENOUS ONCE
OUTPATIENT
Start: 2023-09-04

## 2023-08-28 NOTE — TELEPHONE ENCOUNTER
Connected with the patient via phone and provided education on Stelara. Patient is concerned of cost and needs to know a price before agreeing to treatment. I assured her that there are many resources that are available if she needs patient assistance. Went over how to sign up for a nurse navigator and she agreed to doing so. Elvira: Could you please reach out to the patient regarding cost of IV and SQ injections. Thank you!

## 2023-08-29 NOTE — TELEPHONE ENCOUNTER
Patient is requesting a call back from [facility] [place] to discuss the cost of infusion. ..

## 2023-08-29 NOTE — TELEPHONE ENCOUNTER
8/29 spoke with pt and reviewed her choices, she wants to try applying for the free drug program. I am mailing her the application.

## 2023-08-29 NOTE — TELEPHONE ENCOUNTER
Hi there, yes I had Dr Hernandez Bodily place it as Rosemary reminded me he would have to sign off on  it anyway. So just figured easier to have him place. Sorry about that.

## 2023-08-30 ENCOUNTER — HOSPITAL ENCOUNTER (OUTPATIENT)
Dept: MRI IMAGING | Facility: HOSPITAL | Age: 58
Discharge: HOME | End: 2023-08-30
Payer: COMMERCIAL

## 2023-08-30 DIAGNOSIS — K50.90 CROHN'S DISEASE WITHOUT COMPLICATION, UNSPECIFIED GASTROINTESTINAL TRACT LOCATION (HCC): ICD-10-CM

## 2023-08-30 DIAGNOSIS — Z12.31 SCREENING MAMMOGRAM, ENCOUNTER FOR: Primary | ICD-10-CM

## 2023-08-30 PROCEDURE — A9585 GADOBUTROL INJECTION: HCPCS | Performed by: PHYSICIAN ASSISTANT

## 2023-08-30 PROCEDURE — 72197 MRI PELVIS W/O & W/DYE: CPT

## 2023-08-30 PROCEDURE — 74183 MRI ABD W/O CNTR FLWD CNTR: CPT

## 2023-08-30 PROCEDURE — G1004 CDSM NDSC: HCPCS

## 2023-08-30 RX ORDER — GADOBUTROL 604.72 MG/ML
6 INJECTION INTRAVENOUS
Status: COMPLETED | OUTPATIENT
Start: 2023-08-30 | End: 2023-08-30

## 2023-08-30 RX ADMIN — GADOBUTROL 6 ML: 604.72 INJECTION INTRAVENOUS at 10:09

## 2023-08-30 RX ADMIN — GLUCAGON HYDROCHLORIDE 1 MG: KIT at 10:09

## 2023-09-19 ENCOUNTER — OFFICE VISIT (OUTPATIENT)
Dept: CARDIOLOGY CLINIC | Facility: HOSPITAL | Age: 58
End: 2023-09-19
Payer: COMMERCIAL

## 2023-09-19 VITALS
OXYGEN SATURATION: 99 % | DIASTOLIC BLOOD PRESSURE: 68 MMHG | SYSTOLIC BLOOD PRESSURE: 104 MMHG | HEART RATE: 66 BPM | HEIGHT: 62 IN | WEIGHT: 149.6 LBS | BODY MASS INDEX: 27.53 KG/M2

## 2023-09-19 DIAGNOSIS — Z95.5 S/P BARE METAL CORONARY ARTERY STENT: ICD-10-CM

## 2023-09-19 DIAGNOSIS — I25.119 CORONARY ARTERY DISEASE INVOLVING NATIVE CORONARY ARTERY OF NATIVE HEART WITH ANGINA PECTORIS (HCC): Primary | ICD-10-CM

## 2023-09-19 DIAGNOSIS — I25.10 ASCVD (ARTERIOSCLEROTIC CARDIOVASCULAR DISEASE): ICD-10-CM

## 2023-09-19 DIAGNOSIS — R00.2 PALPITATIONS: ICD-10-CM

## 2023-09-19 DIAGNOSIS — E78.2 MIXED HYPERLIPIDEMIA: ICD-10-CM

## 2023-09-19 PROCEDURE — 99214 OFFICE O/P EST MOD 30 MIN: CPT | Performed by: INTERNAL MEDICINE

## 2023-09-19 PROCEDURE — 93000 ELECTROCARDIOGRAM COMPLETE: CPT | Performed by: INTERNAL MEDICINE

## 2023-09-19 NOTE — PROGRESS NOTES
Jose Forward  1965  [id number]  [address] [place] [id number]  [phone]  [phone]    1. Coronary artery disease involving native coronary artery of native heart with angina pectoris (HCC)  POCT ECG      2. Mixed hyperlipidemia  POCT ECG      3. S/p bare metal coronary artery stent        4. Palpitations  POCT ECG      5. ASCVD (arteriosclerotic cardiovascular disease)  POCT ECG          Discussion/Summary:   1. Coronary artery disease prior PCI with bare metal stent to 1st diagonal in 2016, last catheterization 2019 showed no obstructive lesions mild to moderate residual disease   2. Preserved LV systolic function   3. Tobacco abuse   4. Hyperlipidemia   5. Palpitations    Recommendations: Overall she has been doing well coronary disease is stable no complaints of angina. Blood pressure is well controlled lipids have been doing well on her current dose of statin. Carotids were screened 2 years ago with no obstruction. Continue aggressive medical therapy no further cardiac testing at this point in time we will follow-up with her in 12 months. Interval History:   61-year-old female who had non-STEMI in 2016 receiving a bare metal stent to the 1st diagonal, subsequent catheterization in 2019 showed no obstructive lesions requiring PCI, hyperlipidemia, tobacco abuse presents to establish care with me in the office. She has been under some stress recently in throughout [facility] during some episode she has had some chest pain with radiation up towards the neck associated with palpitations. Denies any significant dyspnea. There has been no lower extremity edema, PND, orthopnea. Overall she has been fairly active around the house she has been taking medications as prescribed. She has lost a significant amount of weight and is also trying to quit smoking.   She is down to only 1 or 2  Cigarettes a day    Presents for follow-up visit overall feeling well denies any complaints functional capacity has been good. There is been no anginal sounding chest pain, shortness of breath, palpitations, lightheadedness, dizziness, or syncope. There is been a lower extremity edema, PND, orthopnea. She has been taking all medications as prescribed. Medical Problems     Problem List     Anxiety    ASCVD (arteriosclerotic cardiovascular disease)    Overview Signed 1/15/2019  7:53 AM by Lisa Leone MD     Atypical chest pain         HLD (hyperlipidemia)    S/p bare metal coronary artery stent    Overview Signed 4/27/2018  8:34 AM by Lisa Leone MD     Overview:   2.0 x 15mm bare metal stent ([employer]) 1/25/2016 for severe diag lesion and NSTEMI w/preceeding rest angina. [id number] [facility]. Status post non-ST elevation myocardial infarction (NSTEMI)    Fatty food intolerance    Overview Deleted 6/21/2019  8:32 AM by Lisa Leone MD            Left foot pain    Palpitations    Abnormal CT of the abdomen    Coronary artery disease involving native coronary artery of native heart with angina pectoris ([address])    Overview Signed 4/30/2019 11:00 AM by PITO Spencer     Mid Circumflex stenosis 30%         Low back pain    Sciatica    Gastroesophageal reflux disease    Family history of colonic polyps    Chronic bronchitis, unspecified chronic bronchitis type (HCC)    Dislocation of tarsometatarsal joint of right foot              Past Medical History:   Diagnosis Date   • Angina pectoris ([address])    • Anxiety    • BMI 34.0-34.9,adult 3/29/2019   • GERD (gastroesophageal reflux disease)    • Myocardial infarction ([address])     1/24/16   • NSTEMI (non-ST elevated myocardial infarction) ([address]) 1/24/2016    Overview:  Max trop I 1.77([facility]) w/nml LVEF and wall motion.     • Shortness of breath      Social History     Socioeconomic History   • Marital status:      Spouse name: Not on file   • Number of children: Not on file   • Years of education: Not on file   • Highest education level: Not on file   Occupational History   • Not on file   Tobacco Use   • Smoking status: Former     Types: Cigarettes   • Smokeless tobacco: Never   Vaping Use   • Vaping Use: Every day   • Substances: Nicotine, Flavoring   Substance and Sexual Activity   • Alcohol use: No   • Drug use: No   • Sexual activity: Not on file   Other Topics Concern   • Not on file   Social History Narrative   • Not on file     Social Determinants of Health     Financial Resource Strain: Medium Risk (11/10/2022)    Overall Financial Resource Strain (CARDIA)    • Difficulty of Paying Living Expenses: Somewhat hard   Food Insecurity: Not on file   Transportation Needs: No Transportation Needs (11/10/2022)    PRAPARE - Transportation    • Lack of Transportation (Medical): No    • Lack of Transportation (Non-Medical):  No   Physical Activity: Not on file   Stress: Not on file   Social Connections: Not on file   Intimate Partner Violence: Not on file   Housing Stability: Not on file      Family History   Problem Relation Age of Onset   • Atrial fibrillation Mother    • Heart failure Mother    • Diabetes type II Mother    • Hypertension Mother    • Lung disease Mother         Pleural Effusion   • Hypertension Father      Past Surgical History:   Procedure Laterality Date   • CARDIAC SURGERY     • CORONARY ANGIOPLASTY WITH STENT PLACEMENT     • TOE SURGERY     • TUBAL LIGATION     • TUBAL LIGATION         Current Outpatient Medications:   •  ALPRAZolam (XANAX) 1 mg tablet, take 1 tablet by mouth three times a day if needed for anxiety, Disp: 270 tablet, Rfl: 3  •  amLODIPine (NORVASC) 5 mg tablet, take 1 tablet by mouth once daily, Disp: 90 tablet, Rfl: 3  •  atorvastatin (LIPITOR) 80 mg tablet, take 1 tablet by mouth once daily, Disp: 90 tablet, Rfl: 3  •  fexofenadine (ALLEGRA) 180 MG tablet, Take 1 tablet (180 mg total) by mouth daily, Disp: 180 tablet, Rfl: 3  •  metoprolol tartrate (LOPRESSOR) 50 mg tablet, take 1 tablet by mouth twice a day, Disp: 180 tablet, Rfl: 3  •  nitroglycerin (NITROSTAT) 0.4 mg SL tablet, Place 1 tablet (0.4 mg total) under the tongue every 5 (five) minutes as needed for chest pain, Disp: 100 tablet, Rfl: 5  •  Omega-3 Fatty Acids (fish oil) 1,000 mg, Take 1,000 mg by mouth daily, Disp: , Rfl:   •  omeprazole (PriLOSEC) 20 mg delayed release capsule, Take 1 capsule (20 mg total) by mouth daily, Disp: 30 capsule, Rfl: 11  •  traMADol (Ultram) 50 mg tablet, Take 1 tablet (50 mg total) by mouth every 6 (six) hours as needed for moderate pain, Disp: 120 tablet, Rfl: 5  •  traZODone (DESYREL) 100 mg tablet, Take 1 tablet (100 mg total) by mouth daily at bedtime, Disp: 90 tablet, Rfl: 3  •  montelukast (SINGULAIR) 10 mg tablet, Take 1 tablet (10 mg total) by mouth daily at bedtime (Patient not taking: Reported on 9/19/2023), Disp: 90 tablet, Rfl: 3  No Known Allergies    Labs:     Chemistry        Component Value Date/Time    K 4.0 08/18/2023 1444    K 4.4 06/26/2018 0623    CL 99 08/18/2023 1444     06/26/2018 0623    CO2 31 08/18/2023 1444    CO2 24 06/26/2018 0623    BUN 14 08/18/2023 1444    BUN 10 06/26/2018 0623    CREATININE 0.89 08/18/2023 1444        Component Value Date/Time    CALCIUM 9.8 08/18/2023 1444    CALCIUM 9.0 06/26/2018 0623    ALKPHOS 97 08/18/2023 1444    ALKPHOS 128 06/26/2018 0623    AST 18 08/18/2023 1444    AST 21 06/26/2018 0623    ALT 13 08/18/2023 1444    ALT 30 (H) 06/26/2018 0623            No results found for: "CHOL"  Lab Results   Component Value Date    HDL 55 11/23/2022    HDL 58 06/27/2022     Lab Results   Component Value Date    LDLCALC 35 11/23/2022    LDLCALC 57 06/27/2022     Lab Results   Component Value Date    TRIG 95 11/23/2022    TRIG 90 06/27/2022     No results found for: "CHOLHDL"    Imaging: No results found. ECG:   Sinus rhythm poor R-wave progression      Review of Systems   Constitutional: Negative. HENT: Negative. Eyes: Negative.     Cardiovascular: Negative for chest pain, dyspnea on exertion and palpitations. Respiratory: Negative. Endocrine: Negative. Hematologic/Lymphatic: Negative. Skin: Negative. Musculoskeletal: Negative. Gastrointestinal: Negative. Genitourinary: Negative. Neurological: Negative. Psychiatric/Behavioral: Negative. All other systems reviewed and are negative. Vitals:    09/19/23 1324   BP: 104/68   Pulse: 66   SpO2: 99%     Vitals:    09/19/23 1324   Weight: 67.9 kg (149 lb 9.6 oz)     Height: 5' 2" (157.5 cm)   Body mass index is 27.36 kg/m². Physical Exam:  Vital signs reviewed  General:  Alert and cooperative, appears stated age, no acute distress  HEENT:  PERRLA, EOMI, no scleral icterus, no conjunctival pallor  Neck:  No lymphadenopathy, no thyromegaly, no carotid bruits, no elevated JVP  Heart:  Regular rate and rhythm, normal S1/S2, no S3/S4, no murmur, rubs or gallops. PMI nondisplaced  Lungs:  Clear to auscultation bilaterally, no wheezes rales or rhonchi  Abdomen:  Soft, non-tender, positive bowel sounds, no rebound or guarding,   no organomegaly   Extremities:  Normal range of motion.   No clubbing, cyanosis or edema   Vascular:  2+ pedal pulses  Skin:  No rashes or lesions on exposed skin  Neurologic:  Cranial nerves II-XII grossly intact without focal deficits  Psych:  Normal mood and affect

## 2023-09-27 ENCOUNTER — OFFICE VISIT (OUTPATIENT)
Dept: FAMILY MEDICINE CLINIC | Facility: CLINIC | Age: 58
End: 2023-09-27
Payer: COMMERCIAL

## 2023-09-27 VITALS
HEART RATE: 66 BPM | SYSTOLIC BLOOD PRESSURE: 108 MMHG | BODY MASS INDEX: 27.05 KG/M2 | OXYGEN SATURATION: 98 % | WEIGHT: 147 LBS | DIASTOLIC BLOOD PRESSURE: 72 MMHG | HEIGHT: 62 IN

## 2023-09-27 DIAGNOSIS — K50.90 CROHN'S DISEASE WITHOUT COMPLICATION, UNSPECIFIED GASTROINTESTINAL TRACT LOCATION (HCC): Primary | ICD-10-CM

## 2023-09-27 PROCEDURE — 99212 OFFICE O/P EST SF 10 MIN: CPT

## 2023-09-27 NOTE — PROGRESS NOTES
Assessment/Plan:    Crohn disease ([address])  Dr. Blayne Caldwell recommends Stelara/ustekinumab infusions  Will fill out paperwork and reach out to GI for some details   1BM daily, regular consistency and colour, w/o abd pain  Continue to avoid dietary triggers       Diagnoses and all orders for this visit:    Crohn's disease without complication, unspecified gastrointestinal tract location [facility])          Subjective:      Patient ID: Komal Lowery is a 66 y.o. female. Ms Agnieszka Kramer is a 59y F w a PMH of Crohn's disease, CAD, GERD, HTN, HLD, anxiety, being seen in clinic regarding paperwork for new medication regimen. The patient was recently diagnosed with Crohn's disease, and following with a GI specialist. Dr. Blayne Caldwlel recommends initiation of Stelara/ustekinumab, and she has brought paperwork with her that needs to be filled by her physician to initiate therapy. We reviewed this together, and have filled much of it out. Will reach out to GI for some details required, and then send in to progress therapy plan as recommended. Patient is having 1 BM daily, of regular consistency, although sometimes it initially comes out as pellets. These are not difficult to pass, and colour remains healthy w/o signs of hematochezia. Denies cramping, diarrhea, abd pain, changes in urinary habits, fever, chills. Counseled the patient on maintaining adequate hydration with water while taking fiber supplements. Patient counseled on other health maintenance points. She has received her flu vaccine for the season. We recommend that the patient receive a COVID booster, given h/o of poor immune response.        The following portions of the patient's history were reviewed and updated as appropriate: allergies, current medications, past family history, past medical history, past social history, past surgical history and problem list.    Review of Systems   Constitutional: Negative for activity change, appetite change, chills, fatigue, fever and unexpected weight change. HENT: Positive for congestion, postnasal drip and sinus pressure. Negative for ear pain, sinus pain, sore throat and trouble swallowing. Eyes: Negative for pain and visual disturbance. Respiratory: Negative for cough, chest tightness, shortness of breath and wheezing. Cardiovascular: Negative for chest pain and palpitations. Gastrointestinal: Negative for abdominal distention, abdominal pain, blood in stool, constipation, diarrhea, nausea and vomiting. Genitourinary: Negative for dysuria, flank pain, frequency, hematuria, pelvic pain and urgency. Musculoskeletal: Positive for arthralgias and back pain. Negative for joint swelling, neck pain and neck stiffness. Skin: Negative for color change and rash. Neurological: Negative for dizziness, seizures, syncope, weakness, light-headedness, numbness and headaches. Psychiatric/Behavioral: Positive for sleep disturbance. Negative for agitation and confusion. The patient is nervous/anxious. All other systems reviewed and are negative. Objective:      /72   Pulse 66   Ht 5' 2" (1.575 m)   Wt 66.7 kg (147 lb)   SpO2 98%   BMI 26.89 kg/m²          Physical Exam  Vitals and nursing note reviewed. Constitutional:       General: She is not in acute distress. Appearance: Normal appearance. She is not ill-appearing. HENT:      Head: Normocephalic and atraumatic. Right Ear: External ear normal.      Left Ear: External ear normal.      Nose: Rhinorrhea present. No congestion. Mouth/Throat:      Mouth: Mucous membranes are moist.      Pharynx: Oropharynx is clear. No oropharyngeal exudate or posterior oropharyngeal erythema. Eyes:      General:         Right eye: No discharge. Left eye: No discharge. Extraocular Movements: Extraocular movements intact. Conjunctiva/sclera: Conjunctivae normal.   Cardiovascular:      Rate and Rhythm: Normal rate and regular rhythm. Pulses: Normal pulses. Heart sounds: Normal heart sounds. No murmur heard. No gallop. Pulmonary:      Effort: Pulmonary effort is normal. No respiratory distress. Breath sounds: Normal breath sounds. No wheezing. Abdominal:      General: Abdomen is flat. Bowel sounds are normal. There is no distension. Palpations: Abdomen is soft. Tenderness: There is no abdominal tenderness. There is no guarding or rebound. Musculoskeletal:         General: No swelling or signs of injury. Normal range of motion. Cervical back: Normal range of motion and neck supple. No rigidity. Right lower leg: No edema. Left lower leg: No edema. Lymphadenopathy:      Cervical: No cervical adenopathy. Skin:     General: Skin is warm and dry. Findings: No erythema or rash. Neurological:      General: No focal deficit present. Mental Status: She is alert and oriented to person, place, and time.    Psychiatric:         Mood and Affect: Mood normal.         Behavior: Behavior normal.

## 2023-09-27 NOTE — ASSESSMENT & PLAN NOTE
Dr. Callie Jackson recommends Stelara/ustekinumab infusions  Will fill out paperwork and reach out to GI for some details   1BM daily, regular consistency and colour, w/o abd pain  Continue to avoid dietary triggers

## 2023-10-03 ENCOUNTER — TELEPHONE (OUTPATIENT)
Dept: GASTROENTEROLOGY | Facility: CLINIC | Age: 58
End: 2023-10-03

## 2023-10-03 NOTE — TELEPHONE ENCOUNTER
Patients GI provider:  Dr. Adolph Be    Number to return call: [phone]    Reason for call: Pt calling wanting a call back to discuss infusions. She is confused if she should just come into apt on 10/16 and infusions will be set up at that time? She states if she does not answer to LVM as she has high security on her phone.     Scheduled procedure/appointment date if applicable: [address]

## 2023-10-04 NOTE — TELEPHONE ENCOUNTER
I spoke with the patient. Pt would like [facility] for her loading dose of Stelara. Will forward to [facility] as she has been in contact with patient regarding authorization and cost of medication.

## 2023-10-13 NOTE — TELEPHONE ENCOUNTER
Pt calling re: getting the authorization for Stelara and possibly low cost or no cost. Pt would like a call back at [phone]. Pt stated she will try and answer phone and if cannot please leave detail message with current status.

## 2023-10-13 NOTE — TELEPHONE ENCOUNTER
Pt calling to check on the status of the Stelara I reached out to Lenora Sevilla to see if any changes she was not casey I advised pt that she will get a call as soon as the approval and or denial she understood

## 2023-10-13 NOTE — TELEPHONE ENCOUNTER
I have discussed with the patient this week that we are waiting on the [place] [employer] to approve the medication. Unfortunately with the massive delay in her giving her paperwork to her PCP instead of the ordering GI office things are taking much longer to get approved. I will follow up again on Monday when I know more.

## 2023-10-16 ENCOUNTER — TELEPHONE (OUTPATIENT)
Dept: FAMILY MEDICINE CLINIC | Facility: CLINIC | Age: 58
End: 2023-10-16

## 2023-10-16 NOTE — TELEPHONE ENCOUNTER
Patient called stating she is going to stop at the office today and bring her Social Security proof of income to be faxed to the [place] patient assistance program for her Stelera inhaler. She will also provide us with e fax number thanks!

## 2023-10-25 DIAGNOSIS — F51.01 PRIMARY INSOMNIA: ICD-10-CM

## 2023-10-25 RX ORDER — TRAZODONE HYDROCHLORIDE 100 MG/1
100 TABLET ORAL
Qty: 90 TABLET | Refills: 3 | Status: SHIPPED | OUTPATIENT
Start: 2023-10-25

## 2023-11-16 ENCOUNTER — TELEPHONE (OUTPATIENT)
Dept: GASTROENTEROLOGY | Facility: CLINIC | Age: 58
End: 2023-11-16

## 2023-11-16 PROBLEM — K50.018 CROHN'S DISEASE OF SMALL INTESTINE WITH OTHER COMPLICATION (HCC): Status: ACTIVE | Noted: 2023-11-16

## 2023-11-16 NOTE — TELEPHONE ENCOUNTER
11/16 The patient was approved to receive Stelara at the end of August. Patient stated she can not afford any out-of-pocket cost for the medication. We have applied for the free drug program and have been  diligently following up with them since the beginning of September. Unfortunately, with certain steps in the process for free drug, there have been delays on the patients end. I have explained to her how to call them and what prompt to select. I will continue to follow up with them on behalf of the patient, however we are now at liberty of the free drug program. My most recent call to them was yesterday and I was told to call back in 48 hours. See telephone encounter from 8/22 for full documentation.

## 2023-11-16 NOTE — TELEPHONE ENCOUNTER
Hi,    I saw the patient today in the office and she was very upset that she still has not heard back about the stelara assistance and she has not yet started the medication. Can you please help address this. She was very upset.      Thank you

## 2023-12-06 ENCOUNTER — HOSPITAL ENCOUNTER (OUTPATIENT)
Dept: INFUSION CENTER | Facility: HOSPITAL | Age: 58
Discharge: HOME | End: 2023-12-06
Attending: INTERNAL MEDICINE
Payer: COMMERCIAL

## 2023-12-06 VITALS
DIASTOLIC BLOOD PRESSURE: 60 MMHG | OXYGEN SATURATION: 98 % | SYSTOLIC BLOOD PRESSURE: 117 MMHG | TEMPERATURE: 96.8 F | HEART RATE: 66 BPM | RESPIRATION RATE: 18 BRPM

## 2023-12-06 DIAGNOSIS — K50.90 CROHN'S DISEASE WITHOUT COMPLICATION, UNSPECIFIED GASTROINTESTINAL TRACT LOCATION (HCC): Primary | ICD-10-CM

## 2023-12-06 PROCEDURE — 96365 THER/PROPH/DIAG IV INF INIT: CPT

## 2023-12-06 RX ORDER — SODIUM CHLORIDE 9 MG/ML
20 INJECTION, SOLUTION INTRAVENOUS ONCE
Status: CANCELLED | OUTPATIENT
Start: 2023-12-06

## 2023-12-06 RX ORDER — SODIUM CHLORIDE 9 MG/ML
20 INJECTION, SOLUTION INTRAVENOUS ONCE
Status: COMPLETED | OUTPATIENT
Start: 2023-12-06 | End: 2023-12-06

## 2023-12-06 RX ADMIN — USTEKINUMAB 390 MG: 130 SOLUTION INTRAVENOUS at 15:34

## 2023-12-06 RX ADMIN — SODIUM CHLORIDE 20 ML/HR: 0.9 INJECTION, SOLUTION INTRAVENOUS at 15:34

## 2023-12-06 NOTE — PLAN OF CARE
Problem: Potential for Falls  Goal: Patient will remain free of falls  Description: INTERVENTIONS:  - Educate patient/family on patient safety including physical limitations  - Instruct patient to call for assistance with activity   - Consult OT/PT to assist with strengthening/mobility   - Keep Call bell within reach  - Keep bed low and locked with side rails adjusted as appropriate  - Keep care items and personal belongings within reach  - Initiate and maintain comfort rounds  - Make Fall Risk Sign visible to staff  - Consider moving patient to room near nurses station  Outcome: Progressing     Problem: INFECTION - ADULT  Goal: Absence or prevention of progression during hospitalization  Description: INTERVENTIONS:  - Assess and monitor for signs and symptoms of infection  - Monitor lab/diagnostic results  - Monitor all insertion sites, i.e. indwelling lines, tubes, and drains  - Monitor endotracheal if appropriate and nasal secretions for changes in amount and color  - [place] appropriate cooling/warming therapies per order  - Administer medications as ordered  - Instruct and encourage patient and family to use good hand hygiene technique  - Identify and instruct in appropriate isolation precautions for identified infection/condition  Outcome: Progressing     Problem: Knowledge Deficit  Goal: Patient/family/caregiver demonstrates understanding of disease process, treatment plan, medications, and discharge instructions  Description: Complete learning assessment and assess knowledge base.   Interventions:  - Provide teaching at level of understanding  - Provide teaching via preferred learning methods  Outcome: Progressing

## 2023-12-06 NOTE — PROGRESS NOTES
Pt denies being on any antibiotics or active infection. Pt tolerated Stelara infusion well with no s/s of reaction. Peripheral IV removed without incident. AVS printed and given to pt. Pt only receiving initial loading dose at infusion center and will be giving self-injections from here on out. Pt discharged in stable condition.

## 2023-12-13 ENCOUNTER — NURSE TRIAGE (OUTPATIENT)
Age: 58
End: 2023-12-13

## 2023-12-13 NOTE — TELEPHONE ENCOUNTER
Pt has questions regarding Stelara injections. Received notification from her specialty pharmacy of [facility]. Pt is requesting a callback to discuss instructions. Pt aware of Nurse Navigator program and states she will call. Reason for Disposition   Information only question and nurse able to answer    Answer Assessment - Initial Assessment Questions  1. REASON FOR CALL or QUESTION:     Pt has questions regarding Stelara injections. Received notification form her specialty pharmacy of [facility]. Pt is requesting a callback to discuss instructions. Pt aware of Nurse Navigator program available and will call. Protocols used:  Information Only Call - No Triage-ADULT-[place]

## 2023-12-13 NOTE — TELEPHONE ENCOUNTER
I connected with the patient on the phone. Stelara infusion 12/6/23. Pt aware first Stelara injection due 8 weeks following 12/6 infusion date. Pt aware medication needs to be refrigerated. All questions answered at this time. Phone number provided for [address] [place] [address] and for patient to call pharmacy 2 weeks prior to injection date to set up delivery.

## 2023-12-26 DIAGNOSIS — K50.90 CROHN'S DISEASE WITHOUT COMPLICATION, UNSPECIFIED GASTROINTESTINAL TRACT LOCATION (HCC): ICD-10-CM

## 2023-12-26 RX ORDER — TRAMADOL HYDROCHLORIDE 50 MG/1
50 TABLET ORAL EVERY 6 HOURS PRN
Qty: 120 TABLET | Refills: 3 | Status: SHIPPED | OUTPATIENT
Start: 2023-12-26

## 2024-01-03 ENCOUNTER — OFFICE VISIT (OUTPATIENT)
Dept: FAMILY MEDICINE CLINIC | Facility: CLINIC | Age: 59
End: 2024-01-03
Payer: COMMERCIAL

## 2024-01-03 VITALS
HEIGHT: 61 IN | SYSTOLIC BLOOD PRESSURE: 116 MMHG | TEMPERATURE: 96.8 F | WEIGHT: 140 LBS | OXYGEN SATURATION: 97 % | HEART RATE: 82 BPM | BODY MASS INDEX: 26.43 KG/M2 | DIASTOLIC BLOOD PRESSURE: 64 MMHG

## 2024-01-03 DIAGNOSIS — Z00.00 ENCOUNTER FOR MEDICARE ANNUAL WELLNESS EXAM: Primary | ICD-10-CM

## 2024-01-03 DIAGNOSIS — F41.9 ANXIETY: ICD-10-CM

## 2024-01-03 PROCEDURE — 99213 OFFICE O/P EST LOW 20 MIN: CPT

## 2024-01-03 RX ORDER — ALPRAZOLAM 1 MG/1
TABLET ORAL
Qty: 270 TABLET | Refills: 3 | Status: SHIPPED | OUTPATIENT
Start: 2024-01-03

## 2024-01-03 RX ORDER — USTEKINUMAB 90 MG/ML
INJECTION, SOLUTION SUBCUTANEOUS
COMMUNITY
Start: 2023-12-12

## 2024-01-03 NOTE — PATIENT INSTRUCTIONS
Medicare Preventive Visit Patient Instructions  Thank you for completing your Welcome to Medicare Visit or Medicare Annual Wellness Visit today. Your next wellness visit will be due in one year (1/3/2025).  The screening/preventive services that you may require over the next 5-10 years are detailed below. Some tests may not apply to you based off risk factors and/or age. Screening tests ordered at today's visit but not completed yet may show as past due. Also, please note that scanned in results may not display below.  Preventive Screenings:  Service Recommendations Previous Testing/Comments   Colorectal Cancer Screening  * Colonoscopy    * Fecal Occult Blood Test (FOBT)/Fecal Immunochemical Test (FIT)  * Fecal DNA/Cologuard Test  * Flexible Sigmoidoscopy Age: 45-75 years old   Colonoscopy: every 10 years (may be performed more frequently if at higher risk)  OR  FOBT/FIT: every 1 year  OR  Cologuard: every 3 years  OR  Sigmoidoscopy: every 5 years  Screening may be recommended earlier than age 45 if at higher risk for colorectal cancer. Also, an individualized decision between you and your healthcare provider will decide whether screening between the ages of 76-85 would be appropriate. Colonoscopy: 05/23/2023  FOBT/FIT: Not on file  Cologuard: Not on file  Sigmoidoscopy: Not on file    Screening Current     Breast Cancer Screening Age: 40+ years old  Frequency: every 1-2 years  Not required if history of left and right mastectomy Mammogram: Not on file        Cervical Cancer Screening Between the ages of 21-29, pap smear recommended once every 3 years.   Between the ages of 30-65, can perform pap smear with HPV co-testing every 5 years.   Recommendations may differ for women with a history of total hysterectomy, cervical cancer, or abnormal pap smears in past. Pap Smear: 06/21/2023    Screening Current   Hepatitis C Screening Once for adults born between 1945 and 1965  More frequently in patients at high risk for  Hepatitis C Hep C Antibody: 06/18/2021    Screening Current   Diabetes Screening 1-2 times per year if you're at risk for diabetes or have pre-diabetes Fasting glucose: 91 mg/dL (4/11/2023)  A1C: 5.4 % (8/18/2023)  Screening Current   Cholesterol Screening Once every 5 years if you don't have a lipid disorder. May order more often based on risk factors. Lipid panel: 11/23/2022    Screening Not Indicated  History Lipid Disorder     Other Preventive Screenings Covered by Medicare:  Abdominal Aortic Aneurysm (AAA) Screening: covered once if your at risk. You're considered to be at risk if you have a family history of AAA.  Lung Cancer Screening: covers low dose CT scan once per year if you meet all of the following conditions: (1) Age 55-77; (2) No signs or symptoms of lung cancer; (3) Current smoker or have quit smoking within the last 15 years; (4) You have a tobacco smoking history of at least 20 pack years (packs per day multiplied by number of years you smoked); (5) You get a written order from a healthcare provider.  Glaucoma Screening: covered annually if you're considered high risk: (1) You have diabetes OR (2) Family history of glaucoma OR (3)  aged 50 and older OR (4)  American aged 65 and older  Osteoporosis Screening: covered every 2 years if you meet one of the following conditions: (1) You're estrogen deficient and at risk for osteoporosis based off medical history and other findings; (2) Have a vertebral abnormality; (3) On glucocorticoid therapy for more than 3 months; (4) Have primary hyperparathyroidism; (5) On osteoporosis medications and need to assess response to drug therapy.   Last bone density test (DXA Scan): Not on file.  HIV Screening: covered annually if you're between the age of 15-65. Also covered annually if you are younger than 15 and older than 65 with risk factors for HIV infection. For pregnant patients, it is covered up to 3 times per  pregnancy.    Immunizations:  Immunization Recommendations   Influenza Vaccine Annual influenza vaccination during flu season is recommended for all persons aged >= 6 months who do not have contraindications   Pneumococcal Vaccine   * Pneumococcal conjugate vaccine = PCV13 (Prevnar 13), PCV15 (Vaxneuvance), PCV20 (Prevnar 20)  * Pneumococcal polysaccharide vaccine = PPSV23 (Pneumovax) Adults 19-65 yo with certain risk factors or if 65+ yo  If never received any pneumonia vaccine: recommend Prevnar 20 (PCV20)  Give PCV20 if previously received 1 dose of PCV13 or PPSV23   Hepatitis B Vaccine 3 dose series if at intermediate or high risk (ex: diabetes, end stage renal disease, liver disease)   Respiratory syncytial virus (RSV) Vaccine - COVERED BY MEDICARE PART D  * RSVPreF3 (Arexvy) CDC recommends that adults 60 years of age and older may receive a single dose of RSV vaccine using shared clinical decision-making (SCDM)   Tetanus (Td) Vaccine - COST NOT COVERED BY MEDICARE PART B Following completion of primary series, a booster dose should be given every 10 years to maintain immunity against tetanus. Td may also be given as tetanus wound prophylaxis.   Tdap Vaccine - COST NOT COVERED BY MEDICARE PART B Recommended at least once for all adults. For pregnant patients, recommended with each pregnancy.   Shingles Vaccine (Shingrix) - COST NOT COVERED BY MEDICARE PART B  2 shot series recommended in those 19 years and older who have or will have weakened immune systems or those 50 years and older     Health Maintenance Due:      Topic Date Due   • Breast Cancer Screening: Mammogram  Never done   • Cervical Cancer Screening  06/21/2026   • Colorectal Cancer Screening  05/20/2033   • HIV Screening  Completed   • Hepatitis C Screening  Completed     Immunizations Due:      Topic Date Due   • Influenza Vaccine (1) 09/01/2023   • COVID-19 Vaccine (3 - 2023-24 season) 09/01/2023     Advance Directives   What are advance  directives?  Advance directives are legal documents that state your wishes and plans for medical care. These plans are made ahead of time in case you lose your ability to make decisions for yourself. Advance directives can apply to any medical decision, such as the treatments you want, and if you want to donate organs.   What are the types of advance directives?  There are many types of advance directives, and each state has rules about how to use them. You may choose a combination of any of the following:  Living will:  This is a written record of the treatment you want. You can also choose which treatments you do not want, which to limit, and which to stop at a certain time. This includes surgery, medicine, IV fluid, and tube feedings.   Durable power of  for healthcare (DPAHC):  This is a written record that states who you want to make healthcare choices for you when you are unable to make them for yourself. This person, called a proxy, is usually a family member or a friend. You may choose more than 1 proxy.  Do not resuscitate (DNR) order:  A DNR order is used in case your heart stops beating or you stop breathing. It is a request not to have certain forms of treatment, such as CPR. A DNR order may be included in other types of advance directives.  Medical directive:  This covers the care that you want if you are in a coma, near death, or unable to make decisions for yourself. You can list the treatments you want for each condition. Treatment may include pain medicine, surgery, blood transfusions, dialysis, IV or tube feedings, and a ventilator (breathing machine).  Values history:  This document has questions about your views, beliefs, and how you feel and think about life. This information can help others choose the care that you would choose.  Why are advance directives important?  An advance directive helps you control your care. Although spoken wishes may be used, it is better to have your wishes  written down. Spoken wishes can be misunderstood, or not followed. Treatments may be given even if you do not want them. An advance directive may make it easier for your family to make difficult choices about your care.   Weight Management   Why it is important to manage your weight:  Being overweight increases your risk of health conditions such as heart disease, high blood pressure, type 2 diabetes, and certain types of cancer. It can also increase your risk for osteoarthritis, sleep apnea, and other respiratory problems. Aim for a slow, steady weight loss. Even a small amount of weight loss can lower your risk of health problems.  How to lose weight safely:  A safe and healthy way to lose weight is to eat fewer calories and get regular exercise. You can lose up about 1 pound a week by decreasing the number of calories you eat by 500 calories each day.   Healthy meal plan for weight management:  A healthy meal plan includes a variety of foods, contains fewer calories, and helps you stay healthy. A healthy meal plan includes the following:  Eat whole-grain foods more often.  A healthy meal plan should contain fiber. Fiber is the part of grains, fruits, and vegetables that is not broken down by your body. Whole-grain foods are healthy and provide extra fiber in your diet. Some examples of whole-grain foods are whole-wheat breads and pastas, oatmeal, brown rice, and bulgur.  Eat a variety of vegetables every day.  Include dark, leafy greens such as spinach, kale, alissa greens, and mustard greens. Eat yellow and orange vegetables such as carrots, sweet potatoes, and winter squash.   Eat a variety of fruits every day.  Choose fresh or canned fruit (canned in its own juice or light syrup) instead of juice. Fruit juice has very little or no fiber.  Eat low-fat dairy foods.  Drink fat-free (skim) milk or 1% milk. Eat fat-free yogurt and low-fat cottage cheese. Try low-fat cheeses such as mozzarella and other reduced-fat  cheeses.  Choose meat and other protein foods that are low in fat.  Choose beans or other legumes such as split peas or lentils. Choose fish, skinless poultry (chicken or turkey), or lean cuts of red meat (beef or pork). Before you cook meat or poultry, cut off any visible fat.   Use less fat and oil.  Try baking foods instead of frying them. Add less fat, such as margarine, sour cream, regular salad dressing and mayonnaise to foods. Eat fewer high-fat foods. Some examples of high-fat foods include french fries, doughnuts, ice cream, and cakes.  Eat fewer sweets.  Limit foods and drinks that are high in sugar. This includes candy, cookies, regular soda, and sweetened drinks.  Exercise:  Exercise at least 30 minutes per day on most days of the week. Some examples of exercise include walking, biking, dancing, and swimming. You can also fit in more physical activity by taking the stairs instead of the elevator or parking farther away from stores. Ask your healthcare provider about the best exercise plan for you.   Narcotic (Opioid) Safety    Use narcotics safely:  Take prescribed narcotics exactly as directed  Do not give narcotics to others or take narcotics that belong to someone else  Do not mix narcotics without medicines or alcohol  Do not drive or operate heavy machinery after you take the narcotic  Monitor for side effects and notify your healthcare provider if you experienced side effects such as nausea, sleepiness, itching, or trouble thinking clearly.    Manage constipation:    Constipation is the most common side effect of narcotic medicine. Constipation is when you have hard, dry bowel movements, or you go longer than usual between bowel movements. Tell your healthcare provider about all changes in your bowel movements while you are taking narcotics. He or she may recommend laxative medicine to help you have a bowel movement. He or she may also change the kind of narcotic you are taking, or change when you  take it. The following are more ways you can prevent or relieve constipation:    Drink liquids as directed.  You may need to drink extra liquids to help soften and move your bowels. Ask how much liquid to drink each day and which liquids are best for you.  Eat high-fiber foods.  This may help decrease constipation by adding bulk to your bowel movements. High-fiber foods include fruits, vegetables, whole-grain breads and cereals, and beans. Your healthcare provider or dietitian can help you create a high-fiber meal plan. Your provider may also recommend a fiber supplement if you cannot get enough fiber from food.  Exercise regularly.  Regular physical activity can help stimulate your intestines. Walking is a good exercise to prevent or relieve constipation. Ask which exercises are best for you.  Schedule a time each day to have a bowel movement.  This may help train your body to have regular bowel movements. Bend forward while you are on the toilet to help move the bowel movement out. Sit on the toilet for at least 10 minutes, even if you do not have a bowel movement.    Store narcotics safely:   Store narcotics where others cannot easily get them.  Keep them in a locked cabinet or secure area. Do not  keep them in a purse or other bag you carry with you. A person may be looking for something else and find the narcotics.  Make sure narcotics are stored out of the reach of children.  A child can easily overdose on narcotics. Narcotics may look like candy to a small child.    The best way to dispose of narcotics:      The laws vary by country and area. In the [place], the best way is to return the narcotics through a take-back program. This program is offered by the [place] Drug Enforcement Agency (MADY). The following are options for using the program:  Take the narcotics to a MADY collection site.  The site is often a law enforcement center. Call your local law enforcement center for scheduled take-back days in your  area. You will be given information on where to go if the collection site is in a different location.  Take the narcotics to an approved pharmacy or hospital.  A pharmacy or hospital may be set up as a collection site. You will need to ask if it is a MADY collection site if you were not directed there. A pharmacy or doctor's office may not be able to take back narcotics unless it is a MADY site.  Use a mail-back system.  This means you are given containers to put the narcotics into. You will then mail them in the containers.  Use a take-back drop box.  This is a place to leave the narcotics at any time. People and animals will not be able to get into the box. Your local law enforcement agency can tell you where to find a drop box in your area.    Other ways to manage pain:   Ask your healthcare provider about non-narcotic medicines to control pain.  Nonprescription medicines include NSAIDs (such as ibuprofen) and acetaminophen. Prescription medicines include muscle relaxers, antidepressants, and steroids.  Pain may be managed without any medicines.  Some ways to relieve pain include massage, aromatherapy, or meditation. Physical or occupational therapy may also help.    For more information:   Drug Enforcement Administration  [address] [place] [address]  Phone: 1- 770 - 115-5444  Web Address: https://www.deadiversion.[facility].gov/drug_disposal/    [place] Food and Drug Administration  [address] [place] [address]  Phone: 7- 360 - 006-4293  Web Address: http://www.fda.gov     © Copyright [employer] 2018 Information is for End User's use only and may not be sold, redistributed or otherwise used for commercial purposes. All illustrations and images included in CareNotes® are the copyrighted property of [employer] or [employer]

## 2024-01-03 NOTE — PROGRESS NOTES
Assessment and Plan:     Problem List Items Addressed This Visit       Encounter for Medicare annual wellness exam - Primary    BMI 26.0-26.9,adult       Depression Screening and Follow-up Plan: Patient was screened for depression during today's encounter. They screened negative with a PHQ-2 score of 0.    Tobacco Cessation Counseling: Tobacco cessation counseling was provided. The patient is sincerely urged to quit consumption of tobacco. She is ready to quit tobacco.     Preventive health issues were discussed with patient, and age appropriate screening tests were ordered as noted in patient's After Visit Summary.  Personalized health advice and appropriate referrals for health education or preventive services given if needed, as noted in patient's After Visit Summary.     History of Present Illness:     Patient presents for a Medicare Wellness Visit    Pt is a 57 yo female w pmhx celiac disease, hyperlipidemia, cardiovascular disease, anxiety, anemia.  Patient seen in clinic today for annual wellness visit.  Overall, patient feels well and has no complaints.  Of note, she started Stelara injection for celiac disease.  States that she has introduced new foods and is tolerating them well.  Denies nausea, vomiting, diarrhea, constipation.  States that her stools are more formed compared to previously.  Has told her that it will take roughly 1 year for her stomach to fully heal.  She is looking forward to that.  Patient quit smoking many years ago but continues to vape.  She is not interested in quitting at this time.  Patient has slightly elevated BMI, lost 100 pounds, and continues to focus on diet.       Patient Care Team:  Uziel Blackmon MD as PCP - General (Family Medicine)  Uziel Blackmon MD as PCP - PCP-[facility] ([facility])  Uziel Blackmon MD as PCP - PCP-[facility] ([facility])  Eduardo Palmer DO (Cardiology)  Андрей Heath Jr., MD (Cardiology)     Review of Systems:     Review of Systems    Constitutional:  Negative for chills and fever.   HENT:  Negative for ear pain and sore throat.    Eyes:  Negative for pain and visual disturbance.   Respiratory:  Negative for cough and shortness of breath.    Cardiovascular:  Negative for chest pain and palpitations.   Gastrointestinal:  Negative for abdominal pain, constipation, diarrhea, nausea and vomiting.   Genitourinary:  Negative for dysuria and hematuria.   Musculoskeletal:  Negative for arthralgias and back pain.   Skin:  Negative for color change and rash.   Neurological:  Negative for seizures and syncope.   Psychiatric/Behavioral:  Negative for behavioral problems and sleep disturbance.    All other systems reviewed and are negative.       Problem List:     Patient Active Problem List   Diagnosis    Anxiety    ASCVD (arteriosclerotic cardiovascular disease)    HLD (hyperlipidemia)    S/p bare metal coronary artery stent    Status post non-ST elevation myocardial infarction (NSTEMI)    Fatty food intolerance    Left foot pain    Palpitations    Abnormal CT of the abdomen    Coronary artery disease involving native coronary artery of native heart with angina pectoris (HCC)    Low back pain    Sciatica    Gastroesophageal reflux disease    Family history of colonic polyps    Chronic bronchitis, unspecified chronic bronchitis type (HCC)    Dislocation of tarsometatarsal joint of right foot    Crohn disease (HCC)    Encounter for gynecological examination without abnormal finding    Crohn's disease of small intestine with other complication (HCC)    Encounter for Medicare annual wellness exam    BMI 26.0-26.9,adult      Past Medical and Surgical History:     Past Medical History:   Diagnosis Date    Angina pectoris (HCC)     Anxiety     BMI 34.0-34.9,adult 3/29/2019    GERD (gastroesophageal reflux disease)     Myocardial infarction ([facility])     1/24/16    NSTEMI (non-ST elevated myocardial infarction) ([facility]) 1/24/2016    Overview:  Max trop I 1.77([facility]) w/nml  LVEF and wall motion.     Shortness of breath      Past Surgical History:   Procedure Laterality Date    CARDIAC SURGERY      CORONARY ANGIOPLASTY WITH STENT PLACEMENT      TOE SURGERY      TUBAL LIGATION      TUBAL LIGATION        Family History:     Family History   Problem Relation Age of Onset    Atrial fibrillation Mother     Heart failure Mother     Diabetes type II Mother     Hypertension Mother     Lung disease Mother         Pleural Effusion    Hypertension Father       Social History:     Social History     Socioeconomic History    Marital status:      Spouse name: None    Number of children: None    Years of education: None    Highest education level: None   Occupational History    None   Tobacco Use    Smoking status: Former     Types: Cigarettes    Smokeless tobacco: Never   Vaping Use    Vaping status: Every Day    Substances: Nicotine, Flavoring   Substance and Sexual Activity    Alcohol use: No    Drug use: No    Sexual activity: None   Other Topics Concern    None   Social History Narrative    None     Social Determinants of Health     Financial Resource Strain: High Risk (1/3/2024)    Overall Financial Resource Strain (CARDIA)     Difficulty of Paying Living Expenses: Hard   Food Insecurity: Not on file   Transportation Needs: No Transportation Needs (1/3/2024)    PRAPARE - Transportation     Lack of Transportation (Medical): No     Lack of Transportation (Non-Medical): No   Physical Activity: Not on file   Stress: Not on file   Social Connections: Not on file   Intimate Partner Violence: Not on file   Housing Stability: Not on file      Medications and Allergies:     Current Outpatient Medications   Medication Sig Dispense Refill    Stelara 90 MG/ML subcutaneous injection INJECT 1 SYRINGE UNDER THE SKIN EVERY 8 WEEKS      ALPRAZolam (XANAX) 1 mg tablet take 1 tablet by mouth three times a day if needed for anxiety 270 tablet 3    amLODIPine (NORVASC) 5 mg tablet take 1 tablet by mouth once  daily 90 tablet 3    atorvastatin (LIPITOR) 80 mg tablet take 1 tablet by mouth once daily 90 tablet 3    fexofenadine (ALLEGRA) 180 MG tablet Take 1 tablet (180 mg total) by mouth daily 180 tablet 3    metoprolol tartrate (LOPRESSOR) 50 mg tablet take 1 tablet by mouth twice a day 180 tablet 3    montelukast (SINGULAIR) 10 mg tablet Take 1 tablet (10 mg total) by mouth daily at bedtime 90 tablet 3    nitroglycerin (NITROSTAT) 0.4 mg SL tablet Place 1 tablet (0.4 mg total) under the tongue every 5 (five) minutes as needed for chest pain 100 tablet 5    Omega-3 Fatty Acids (fish oil) 1,000 mg Take 1,000 mg by mouth daily      omeprazole (PriLOSEC) 20 mg delayed release capsule Take 1 capsule (20 mg total) by mouth daily 30 capsule 11    Stelara 130 MG/26ML SOLN INFUSE 390MG THROUGH IV      traMADol (ULTRAM) 50 mg tablet take 1 tablet by mouth every 6 hours if needed for moderate pain 120 tablet 3    traZODone (DESYREL) 100 mg tablet take 1 tablet by mouth at bedtime 90 tablet 3     No current facility-administered medications for this visit.     No Known Allergies   Immunizations:     Immunization History   Administered Date(s) Administered    COVID-19 PFIZER VACCINE 0.3 ML IM 02/28/2021, 03/27/2021    INFLUENZA 08/17/2016, 08/02/2017, 08/23/2018, 08/18/2022    Influenza, injectable, quadrivalent, preservative free 0.5 mL 08/23/2018    Influenza, recombinant, quadrivalent,injectable, preservative free 08/15/2020    Pneumococcal Polysaccharide PPV23 06/03/2020    Zoster Vaccine Recombinant 09/15/2020      Health Maintenance:         Topic Date Due    Breast Cancer Screening: Mammogram  Never done    Cervical Cancer Screening  06/21/2026    Colorectal Cancer Screening  05/20/2033    HIV Screening  Completed    Hepatitis C Screening  Completed         Topic Date Due    Influenza Vaccine (1) 09/01/2023    COVID-19 Vaccine (3 - 2023-24 season) 09/01/2023      Medicare Screening Tests and Risk Assessments:         Health  Risk Assessment:   Patient rates overall health as fair. Patient feels that their physical health rating is slightly better. Patient is very satisfied with their life. Eyesight was rated as same. Hearing was rated as same. Patient feels that their emotional and mental health rating is same. Patients states they are never, rarely angry. Patient states they are never, rarely unusually tired/fatigued. Pain experienced in the last 7 days has been some. Patient's pain rating has been 5/10. Patient states that she has experienced no weight loss or gain in last 6 months.     Depression Screening:   PHQ-2 Score: 0      Fall Risk Screening:   In the past year, patient has experienced: no history of falling in past year      Urinary Incontinence Screening:   Patient has not leaked urine accidently in the last six months.     Home Safety:  Patient does not have trouble with stairs inside or outside of their home. Patient has working smoke alarms and has working carbon monoxide detector. Home safety hazards include: none.     Nutrition:   Current diet is Low Saturated Fat, Limited junk food and Low Cholesterol.     Medications:   Patient is currently taking over-the-counter supplements. OTC medications include: see medication list. Patient is able to manage medications.     Activities of Daily Living (ADLs)/Instrumental Activities of Daily Living (IADLs):   Walk and transfer into and out of bed and chair?: Yes  Dress and groom yourself?: Yes    Bathe or shower yourself?: Yes    Feed yourself? Yes  Do your laundry/housekeeping?: Yes  Manage your money, pay your bills and track your expenses?: Yes  Make your own meals?: Yes    Do your own shopping?: Yes    PREVENTIVE SCREENINGS      Cardiovascular Screening:    General: Screening Not Indicated and History Lipid Disorder      Diabetes Screening:     General: Screening Current      Colorectal Cancer Screening:     General: Screening Current      Cervical Cancer Screening:     "General: Screening Current      Lung Cancer Screening:     General: Screening Not Indicated      Hepatitis C Screening:    General: Screening Current    Screening, Brief Intervention, and Referral to Treatment (SBIRT)    Screening  Typical number of drinks in a day: 0    Single Item Drug Screening:  How often have you used an illegal drug (including marijuana) or a prescription medication for non-medical reasons in the past year? never    Single Item Drug Screen Score: 0  Interpretation: Negative screen for possible drug use disorder    Review of Current Opioid Use    Opioid Risk Tool (ORT) Interpretation: Complete Opioid Risk Tool (ORT)    No results found.     Physical Exam:     /64   Pulse 82   Temp (!) 96.8 °F (36 °C)   Ht 5' 1\" (1.549 m)   Wt 63.5 kg (140 lb)   SpO2 97%   BMI 26.45 kg/m²     BMI Counseling: Body mass index is 26.45 kg/m². The BMI is above normal. Nutrition recommendations include 3-5 servings of fruits/vegetables daily, consuming healthier snacks, and moderation in carbohydrate intake.    Physical Exam  Constitutional:       General: She is not in acute distress.     Appearance: She is normal weight.   HENT:      Head: Normocephalic.      Right Ear: External ear normal.      Left Ear: External ear normal.      Nose: No congestion or rhinorrhea.      Mouth/Throat:      Mouth: Mucous membranes are moist.      Pharynx: No oropharyngeal exudate or posterior oropharyngeal erythema.      Comments: No lower teeth  Eyes:      General:         Right eye: No discharge.         Left eye: No discharge.   Cardiovascular:      Rate and Rhythm: Normal rate and regular rhythm.      Pulses: Normal pulses.      Heart sounds: Normal heart sounds. No murmur heard.  Pulmonary:      Effort: Pulmonary effort is normal. No respiratory distress.      Breath sounds: No wheezing or rales.   Abdominal:      General: Abdomen is flat. There is no distension.      Palpations: Abdomen is soft.      Tenderness: " There is no abdominal tenderness.   Musculoskeletal:         General: Normal range of motion.      Cervical back: Normal range of motion.      Right lower leg: No edema.      Left lower leg: No edema.   Skin:     General: Skin is warm and dry.      Findings: No rash.   Neurological:      General: No focal deficit present.      Mental Status: She is alert.      Cranial Nerves: No cranial nerve deficit.      Motor: No weakness.      Gait: Gait normal.   Psychiatric:         Mood and Affect: Mood normal.         Behavior: Behavior normal.        Shannan Valladares MD

## 2024-01-29 ENCOUNTER — TELEPHONE (OUTPATIENT)
Age: 59
End: 2024-01-29

## 2024-01-29 NOTE — TELEPHONE ENCOUNTER
Patient returning call to Shalonda regarding Stelera. Call transferred for Shalonda to further assist.

## 2024-02-21 PROBLEM — Z00.00 ENCOUNTER FOR MEDICARE ANNUAL WELLNESS EXAM: Status: RESOLVED | Noted: 2024-01-03 | Resolved: 2024-02-21

## 2024-02-21 PROBLEM — Z01.419 ENCOUNTER FOR GYNECOLOGICAL EXAMINATION WITHOUT ABNORMAL FINDING: Status: RESOLVED | Noted: 2023-06-21 | Resolved: 2024-02-21

## 2024-03-16 DIAGNOSIS — T78.40XA ALLERGY, INITIAL ENCOUNTER: ICD-10-CM

## 2024-03-18 RX ORDER — MONTELUKAST SODIUM 10 MG/1
10 TABLET ORAL
Qty: 90 TABLET | Refills: 3 | Status: SHIPPED | OUTPATIENT
Start: 2024-03-18

## 2024-03-21 DIAGNOSIS — I25.10 CORONARY ARTERY DISEASE INVOLVING NATIVE CORONARY ARTERY OF NATIVE HEART WITHOUT ANGINA PECTORIS: ICD-10-CM

## 2024-03-21 RX ORDER — METOPROLOL TARTRATE 50 MG/1
TABLET, FILM COATED ORAL
Qty: 180 TABLET | Refills: 3 | Status: SHIPPED | OUTPATIENT
Start: 2024-03-21

## 2024-04-03 ENCOUNTER — OFFICE VISIT (OUTPATIENT)
Dept: FAMILY MEDICINE CLINIC | Facility: CLINIC | Age: 59
End: 2024-04-03
Payer: COMMERCIAL

## 2024-04-03 VITALS
SYSTOLIC BLOOD PRESSURE: 112 MMHG | TEMPERATURE: 97.2 F | OXYGEN SATURATION: 98 % | WEIGHT: 152 LBS | RESPIRATION RATE: 18 BRPM | HEIGHT: 61 IN | DIASTOLIC BLOOD PRESSURE: 68 MMHG | HEART RATE: 71 BPM | BODY MASS INDEX: 28.7 KG/M2

## 2024-04-03 DIAGNOSIS — E78.2 MIXED HYPERLIPIDEMIA: Primary | ICD-10-CM

## 2024-04-03 DIAGNOSIS — K50.90 CROHN'S DISEASE WITHOUT COMPLICATION, UNSPECIFIED GASTROINTESTINAL TRACT LOCATION (HCC): ICD-10-CM

## 2024-04-03 DIAGNOSIS — F41.9 ANXIETY: ICD-10-CM

## 2024-04-03 PROCEDURE — 99213 OFFICE O/P EST LOW 20 MIN: CPT

## 2024-04-03 NOTE — ASSESSMENT & PLAN NOTE
History of hyperlipidemia in setting of coronary artery disease with stents  Established with cardiology, has follow-up scheduled in July 2024  Taking Lipitor 80 mg daily  11/23/22 Lipid panel shows Cholesterol 109, LDL 35  Concerns for low LDL  Discussed repeating lipid panel, patient wishes to repeat in June prior to seeing cardio  Plan:  Continue Lipitor 80 mg daily  Repeat lipid panel in June  Follow-up with cardiology as scheduled

## 2024-04-03 NOTE — ASSESSMENT & PLAN NOTE
"Worsening anxiety   Secondary to life stressors; living with boyfriend, not enjoying social gatherings at camp grounds, daughter lives far away, immunocompromised so cannot travel to family, cannot be in public for long periods of time  States \"I'll be fine\", tearful during visit, denies SI/HI  Declines referral to psych, behavioral therapy, counseling  Takes Xanax 1 mg TID, does not want to change medication at this time  Possibly secondary to winter months  Plan:  Continue Xanax 1mg TID  Follow up in 3 months to assess anxiety symptoms  Follow up sooner if needed  "

## 2024-04-03 NOTE — PROGRESS NOTES
"Assessment/Plan:    HLD (hyperlipidemia)  History of hyperlipidemia in setting of coronary artery disease with stents  Established with cardiology, has follow-up scheduled in July 2024  Taking Lipitor 80 mg daily  11/23/22 Lipid panel shows Cholesterol 109, LDL 35  Concerns for low LDL  Discussed repeating lipid panel, patient wishes to repeat in June prior to seeing cardio  Plan:  Continue Lipitor 80 mg daily  Repeat lipid panel in June  Follow-up with cardiology as scheduled      Crohn disease (HCC)  Has h/o crohns disease  Symptoms well controlled, no recent flares  Taking Stelara 90 mg/ml injection weekly  Follows with GI  Patient continues to wear mask in setting of immunocompromised   Plan:  Cont weekly Stelara injection  Cont f/u with GI    Anxiety  Worsening anxiety   Secondary to life stressors; living with boyfriend, not enjoying social gatherings at camp grounds, daughter lives far away, immunocompromised so cannot travel to family, cannot be in public for long periods of time  States \"I'll be fine\", tearful during visit, denies SI/HI  Declines referral to psych, behavioral therapy, counseling  Takes Xanax 1 mg TID, does not want to change medication at this time  Possibly secondary to winter months  Plan:  Continue Xanax 1mg TID  Follow up in 3 months to assess anxiety symptoms  Follow up sooner if needed     Diagnoses and all orders for this visit:    Mixed hyperlipidemia    Crohn's disease without complication, unspecified gastrointestinal tract location (HCC)    Anxiety          Subjective:      Patient ID: Chelsi Porter is a 58 y.o. female.    Pt is a 57 yo female w pmhx celiac disease, hyperlipidemia, cardiovascular disease, anxiety, anemia.  Patient seen in clinic today for 3 month follow up visit.  Overall, patient feels well and has no complaints.  At last visit, patient had recently started Stelara injection for celiac disease.  States that she is feeling well, continues to introduce new " "foods and is tolerating them well.  Denies nausea, vomiting, diarrhea, constipation.  Patient has slightly elevated BMI, lost 100 pounds, and continues to focus on diet. And is compliant with statin medication. Pt notes feel more anxious recently, states that many things in her life are piling up and she cries frequently. She does not want to talk to a therapist or change her meds. She feels sad that her altonter lives far away and she cannot visit her. No SI/HI.        The following portions of the patient's history were reviewed and updated as appropriate: allergies, current medications, past family history, past medical history, past social history, past surgical history, and problem list.    Review of Systems   Constitutional:  Negative for chills and fever.   HENT:  Negative for ear pain and sore throat.    Eyes:  Negative for pain and visual disturbance.   Respiratory:  Negative for cough and shortness of breath.    Cardiovascular:  Negative for chest pain and palpitations.   Gastrointestinal:  Negative for abdominal pain and vomiting.   Genitourinary:  Negative for dysuria and hematuria.   Musculoskeletal:  Negative for arthralgias and back pain.   Skin:  Negative for color change and rash.   Neurological:  Negative for seizures and syncope.   Psychiatric/Behavioral:  Positive for agitation and behavioral problems. Negative for decreased concentration, self-injury, sleep disturbance and suicidal ideas. The patient is nervous/anxious. The patient is not hyperactive.    All other systems reviewed and are negative.        Objective:      /68 (BP Location: Left arm, Patient Position: Sitting, Cuff Size: Standard)   Pulse 71   Temp (!) 97.2 °F (36.2 °C) (Tympanic)   Resp 18   Ht 5' 1\" (1.549 m)   Wt 68.9 kg (152 lb) Comment: Self report  SpO2 98%   BMI 28.72 kg/m²          Physical Exam  Vitals and nursing note reviewed.   Constitutional:       Appearance: Normal appearance.   HENT:      Right Ear: " External ear normal.      Left Ear: External ear normal.      Nose: No rhinorrhea.   Eyes:      General:         Right eye: No discharge.         Left eye: No discharge.      Conjunctiva/sclera: Conjunctivae normal.   Cardiovascular:      Rate and Rhythm: Normal rate and regular rhythm.      Heart sounds: Normal heart sounds.   Pulmonary:      Effort: Pulmonary effort is normal. No respiratory distress.      Breath sounds: Normal breath sounds.   Musculoskeletal:      Right lower leg: No edema.      Left lower leg: No edema.   Skin:     Coloration: Skin is not jaundiced.      Findings: No erythema or rash.   Neurological:      Mental Status: She is alert.   Psychiatric:         Behavior: Behavior normal.      Comments: Blunted affect, tearful

## 2024-04-03 NOTE — ASSESSMENT & PLAN NOTE
Has h/o crohns disease  Symptoms well controlled, no recent flares  Taking Stelara 90 mg/ml injection weekly  Follows with GI  Patient continues to wear mask in setting of immunocompromised   Plan:  Cont weekly Stelara injection  Cont f/u with GI

## 2024-04-23 DIAGNOSIS — R09.89 CHRONIC THROAT CLEARING: ICD-10-CM

## 2024-04-23 DIAGNOSIS — R13.10 DYSPHAGIA, UNSPECIFIED TYPE: ICD-10-CM

## 2024-04-23 DIAGNOSIS — R10.13 EPIGASTRIC ABDOMINAL PAIN: ICD-10-CM

## 2024-04-23 RX ORDER — OMEPRAZOLE 20 MG/1
20 CAPSULE, DELAYED RELEASE ORAL DAILY
Qty: 30 CAPSULE | Refills: 11 | Status: SHIPPED | OUTPATIENT
Start: 2024-04-23

## 2024-04-27 DIAGNOSIS — I10 ESSENTIAL HYPERTENSION: ICD-10-CM

## 2024-04-27 RX ORDER — AMLODIPINE BESYLATE 5 MG/1
TABLET ORAL
Qty: 90 TABLET | Refills: 1 | Status: SHIPPED | OUTPATIENT
Start: 2024-04-27

## 2024-04-29 ENCOUNTER — VBI (OUTPATIENT)
Dept: ADMINISTRATIVE | Facility: OTHER | Age: 59
End: 2024-04-29

## 2024-04-29 DIAGNOSIS — K50.90 CROHN'S DISEASE WITHOUT COMPLICATION, UNSPECIFIED GASTROINTESTINAL TRACT LOCATION (HCC): ICD-10-CM

## 2024-04-29 RX ORDER — TRAMADOL HYDROCHLORIDE 50 MG/1
50 TABLET ORAL EVERY 6 HOURS PRN
Qty: 120 TABLET | Refills: 5 | Status: SHIPPED | OUTPATIENT
Start: 2024-04-29

## 2024-05-11 DIAGNOSIS — E78.5 HYPERLIPIDEMIA, UNSPECIFIED HYPERLIPIDEMIA TYPE: ICD-10-CM

## 2024-05-11 NOTE — TELEPHONE ENCOUNTER
Medication Refill Request     Name Atorvastatin    Dose/Frequency 80 mg 1x daily  Quantity 90  Verified pharmacy   [x]  Verified ordering Provider   [x]  Does patient have enough for the next 3 days? Yes [x] No []

## 2024-05-13 RX ORDER — ATORVASTATIN CALCIUM 80 MG/1
80 TABLET, FILM COATED ORAL DAILY
Qty: 90 TABLET | Refills: 0 | Status: CANCELLED | OUTPATIENT
Start: 2024-05-13

## 2024-05-14 DIAGNOSIS — E78.2 MIXED HYPERLIPIDEMIA: ICD-10-CM

## 2024-05-14 DIAGNOSIS — I10 ESSENTIAL HYPERTENSION: Primary | ICD-10-CM

## 2024-05-14 DIAGNOSIS — E78.5 HYPERLIPIDEMIA, UNSPECIFIED HYPERLIPIDEMIA TYPE: ICD-10-CM

## 2024-05-14 RX ORDER — ATORVASTATIN CALCIUM 80 MG/1
80 TABLET, FILM COATED ORAL DAILY
Qty: 90 TABLET | Refills: 3 | Status: SHIPPED | OUTPATIENT
Start: 2024-05-14

## 2024-06-04 DIAGNOSIS — R09.89 CHRONIC THROAT CLEARING: ICD-10-CM

## 2024-06-04 DIAGNOSIS — R10.13 EPIGASTRIC ABDOMINAL PAIN: ICD-10-CM

## 2024-06-04 DIAGNOSIS — R13.10 DYSPHAGIA, UNSPECIFIED TYPE: ICD-10-CM

## 2024-06-05 RX ORDER — OMEPRAZOLE 20 MG/1
20 CAPSULE, DELAYED RELEASE ORAL DAILY
Qty: 30 CAPSULE | Refills: 11 | Status: SHIPPED | OUTPATIENT
Start: 2024-06-05

## 2024-06-13 ENCOUNTER — RA CDI HCC (OUTPATIENT)
Dept: OTHER | Facility: HOSPITAL | Age: 59
End: 2024-06-13

## 2024-06-19 ENCOUNTER — RA CDI HCC (OUTPATIENT)
Dept: OTHER | Facility: HOSPITAL | Age: 59
End: 2024-06-19

## 2024-06-26 ENCOUNTER — OFFICE VISIT (OUTPATIENT)
Dept: FAMILY MEDICINE CLINIC | Facility: CLINIC | Age: 59
End: 2024-06-26
Payer: COMMERCIAL

## 2024-06-26 VITALS — TEMPERATURE: 96.6 F | OXYGEN SATURATION: 97 % | BODY MASS INDEX: 30.42 KG/M2 | HEART RATE: 76 BPM | WEIGHT: 161 LBS

## 2024-06-26 DIAGNOSIS — E55.9 VITAMIN D DEFICIENCY, UNSPECIFIED: ICD-10-CM

## 2024-06-26 DIAGNOSIS — R53.83 OTHER FATIGUE: Primary | ICD-10-CM

## 2024-06-26 DIAGNOSIS — K50.90 CROHN'S DISEASE WITHOUT COMPLICATION, UNSPECIFIED GASTROINTESTINAL TRACT LOCATION (HCC): ICD-10-CM

## 2024-06-26 PROCEDURE — 99213 OFFICE O/P EST LOW 20 MIN: CPT

## 2024-06-26 NOTE — ASSESSMENT & PLAN NOTE
Feeling tired for a few weeks  Reports not sleeping well; taking trazodone 100 mg  bed is old and wants to buy a new one, chairs are old and need to be replaced, partner wakes up often during the night and makes noise  Patient wakes up during the night to urinate once or twice,  Patient reports that she snores, declined referral to sleep medicine for sleep study  Patient notes multiple life stressors, she often has racing thoughts before falling asleep  Patient has history of Crohn's disease, concerns for iron deficiency, vitamin D deficiency, B12 deficiency  No history of thyroid disease  Plan:  Reviewed sleep hygiene  Reduce fluid intake after 6PM to limit nighttime urinating  Check labs (CBC, CMP, TSH, vitamin D, B12, lipid panel)

## 2024-06-26 NOTE — PROGRESS NOTES
Ambulatory Visit  Name: Chelsi Porter      : 1965      MRN: [id number]  Encounter Provider: Shannan Valladares MD  Encounter Date: 2024   Encounter department: [facility]    Assessment & Plan   1. Other fatigue  Assessment & Plan:  Feeling tired for a few weeks  Reports not sleeping well; taking trazodone 100 mg  bed is old and wants to buy a new one, chairs are old and need to be replaced, partner wakes up often during the night and makes noise  Patient wakes up during the night to urinate once or twice,  Patient reports that she snores, declined referral to sleep medicine for sleep study  Patient notes multiple life stressors, she often has racing thoughts before falling asleep  Patient has history of Crohn's disease, concerns for iron deficiency, vitamin D deficiency, B12 deficiency  No history of thyroid disease  Plan:  Reviewed sleep hygiene  Reduce fluid intake after 6PM to limit nighttime urinating  Check labs (CBC, CMP, TSH, vitamin D, B12, lipid panel)  Orders:  -     TSH, 3rd generation with Free T4 reflex; Future  -     Vitamin D 25 hydroxy; Future  -     Vitamin B12; Future  2. Vitamin D deficiency, unspecified  -     Vitamin D 25 hydroxy; Future  3. Crohn's disease without complication, unspecified gastrointestinal tract location (HCC)  -     Vitamin B12; Future       History of Present Illness     Patient is a 58-year-old female seen in clinic complaining of fatigue.  States that she feels tired throughout the day for a few weeks now.  First we discussed sleep, patient states that she sleeps 6 to 8 hours at night, however it is not good quality sleep.  She wakes up 1-2 times to urinate, and her partner wakes up multiple times during the night and when staying in the trailer, she can hear him making noises.  She also needs a new mattress and is uncomfortable when sleeping.  She is planning to replace chairs which are uncomfortable.  Overall patient has  decent sleep hygiene habits.  She also takes trazodone 100 mg for sleeping.  Patient reports having a well-rounded diet, she has no bottom teeth so foods are limited, concern for vitamin deficiency in setting of Crohn's disease.        Review of Systems   Constitutional:  Positive for fatigue and unexpected weight change (10 pound weight gain). Negative for chills and fever.   HENT:  Negative for ear pain and sore throat.    Eyes:  Negative for pain and visual disturbance.   Respiratory:  Negative for cough and shortness of breath.    Cardiovascular:  Negative for chest pain and palpitations.   Gastrointestinal:  Negative for abdominal pain and vomiting.   Genitourinary:  Negative for dysuria and hematuria.   Musculoskeletal:  Negative for arthralgias and back pain.   Skin:  Negative for color change and rash.   Neurological:  Negative for seizures and syncope.   Psychiatric/Behavioral:  Positive for sleep disturbance. The patient is nervous/anxious.    All other systems reviewed and are negative.    Medical History Reviewed by provider this encounter:       Past Medical History   Past Medical History:   Diagnosis Date    Angina pectoris ([facility])     Anxiety     BMI 34.0-34.9,adult 03/29/2019    Crohn disease ([facility])     GERD (gastroesophageal reflux disease)     Myocardial infarction ([facility])     1/24/16    NSTEMI (non-ST elevated myocardial infarction) ([facility]) 01/24/2016    Overview:  Max trop I 1.77([facility]) w/nml LVEF and wall motion.     Shortness of breath      Past Surgical History:   Procedure Laterality Date    CARDIAC SURGERY      CORONARY ANGIOPLASTY WITH STENT PLACEMENT      TOE SURGERY      TUBAL LIGATION      TUBAL LIGATION       Family History   Problem Relation Age of Onset    Atrial fibrillation Mother     Heart failure Mother     Diabetes type II Mother     Hypertension Mother     Lung disease Mother         Pleural Effusion    Hypertension Father      Current Outpatient Medications on File Prior to Visit    Medication Sig Dispense Refill    ALPRAZolam (XANAX) 1 mg tablet take 1 tablet by mouth three times a day if needed for anxiety 270 tablet 3    amLODIPine (NORVASC) 5 mg tablet take 1 tablet by mouth once daily 90 tablet 1    atorvastatin (LIPITOR) 80 mg tablet Take 1 tablet (80 mg total) by mouth daily 90 tablet 3    fexofenadine (ALLEGRA) 180 MG tablet Take 1 tablet (180 mg total) by mouth daily 180 tablet 3    metoprolol tartrate (LOPRESSOR) 50 mg tablet take 1 tablet by mouth twice a day 180 tablet 3    montelukast (SINGULAIR) 10 mg tablet take 1 tablet by mouth at bedtime 90 tablet 3    nitroglycerin (NITROSTAT) 0.4 mg SL tablet Place 1 tablet (0.4 mg total) under the tongue every 5 (five) minutes as needed for chest pain (Patient not taking: Reported on 4/3/2024) 100 tablet 5    Omega-3 Fatty Acids (fish oil) 1,000 mg Take 1,000 mg by mouth daily      omeprazole (PriLOSEC) 20 mg delayed release capsule Take 1 capsule (20 mg total) by mouth daily 30 capsule 11    Stelara 130 MG/26ML SOLN INFUSE 390MG THROUGH IV (Patient not taking: Reported on 4/3/2024)      Stelara 90 MG/ML subcutaneous injection INJECT 1 SYRINGE UNDER THE SKIN EVERY 8 WEEKS      traMADol (ULTRAM) 50 mg tablet take 1 tablet by mouth every 6 hours if needed for moderate pain 120 tablet 5    traZODone (DESYREL) 100 mg tablet take 1 tablet by mouth at bedtime (Patient taking differently: Take 100 mg by mouth daily at bedtime Patient take 50 mg) 90 tablet 3     No current facility-administered medications on file prior to visit.   No Known Allergies   Current Outpatient Medications on File Prior to Visit   Medication Sig Dispense Refill    ALPRAZolam (XANAX) 1 mg tablet take 1 tablet by mouth three times a day if needed for anxiety 270 tablet 3    amLODIPine (NORVASC) 5 mg tablet take 1 tablet by mouth once daily 90 tablet 1    atorvastatin (LIPITOR) 80 mg tablet Take 1 tablet (80 mg total) by mouth daily 90 tablet 3    fexofenadine (ALLEGRA)  180 MG tablet Take 1 tablet (180 mg total) by mouth daily 180 tablet 3    metoprolol tartrate (LOPRESSOR) 50 mg tablet take 1 tablet by mouth twice a day 180 tablet 3    montelukast (SINGULAIR) 10 mg tablet take 1 tablet by mouth at bedtime 90 tablet 3    nitroglycerin (NITROSTAT) 0.4 mg SL tablet Place 1 tablet (0.4 mg total) under the tongue every 5 (five) minutes as needed for chest pain (Patient not taking: Reported on 4/3/2024) 100 tablet 5    Omega-3 Fatty Acids (fish oil) 1,000 mg Take 1,000 mg by mouth daily      omeprazole (PriLOSEC) 20 mg delayed release capsule Take 1 capsule (20 mg total) by mouth daily 30 capsule 11    Stelara 130 MG/26ML SOLN INFUSE 390MG THROUGH IV (Patient not taking: Reported on 4/3/2024)      Stelara 90 MG/ML subcutaneous injection INJECT 1 SYRINGE UNDER THE SKIN EVERY 8 WEEKS      traMADol (ULTRAM) 50 mg tablet take 1 tablet by mouth every 6 hours if needed for moderate pain 120 tablet 5    traZODone (DESYREL) 100 mg tablet take 1 tablet by mouth at bedtime (Patient taking differently: Take 100 mg by mouth daily at bedtime Patient take 50 mg) 90 tablet 3     No current facility-administered medications on file prior to visit.      Social History     Tobacco Use    Smoking status: Former     Types: Cigarettes    Smokeless tobacco: Never    Tobacco comments:     Quit 2021   Vaping Use    Vaping status: Every Day    Substances: Nicotine, Flavoring   Substance and Sexual Activity    Alcohol use: No    Drug use: No    Sexual activity: Not Currently     Partners: Male     Objective     Pulse 76   Temp (!) 96.6 °F (35.9 °C)   Wt 73 kg (161 lb)   SpO2 97%   BMI 30.42 kg/m²     Physical Exam  Constitutional:       General: She is not in acute distress.  HENT:      Head: Normocephalic.      Right Ear: External ear normal.      Left Ear: External ear normal.      Nose: No rhinorrhea.      Mouth/Throat:      Mouth: Mucous membranes are moist.   Eyes:      General:         Right eye: No  discharge.         Left eye: No discharge.   Cardiovascular:      Rate and Rhythm: Normal rate and regular rhythm.   Pulmonary:      Effort: Pulmonary effort is normal. No respiratory distress.   Abdominal:      General: There is no distension.      Palpations: Abdomen is soft.   Musculoskeletal:      Cervical back: Normal range of motion.      Right lower leg: No edema.      Left lower leg: No edema.   Skin:     Findings: No erythema or rash.   Neurological:      Mental Status: She is alert. Mental status is at baseline.   Psychiatric:         Mood and Affect: Mood normal.         Behavior: Behavior normal.           Shannan Valladares MD

## 2024-07-23 DIAGNOSIS — F41.9 ANXIETY: ICD-10-CM

## 2024-07-23 RX ORDER — ALPRAZOLAM 1 MG/1
TABLET ORAL
Qty: 270 TABLET | Refills: 0 | Status: SHIPPED | OUTPATIENT
Start: 2024-07-23 | End: 2024-08-01 | Stop reason: SDUPTHER

## 2024-08-01 DIAGNOSIS — I25.10 CORONARY ARTERY DISEASE INVOLVING NATIVE CORONARY ARTERY OF NATIVE HEART WITHOUT ANGINA PECTORIS: ICD-10-CM

## 2024-08-01 DIAGNOSIS — E78.5 HYPERLIPIDEMIA, UNSPECIFIED HYPERLIPIDEMIA TYPE: ICD-10-CM

## 2024-08-01 DIAGNOSIS — F41.9 ANXIETY: ICD-10-CM

## 2024-08-01 RX ORDER — NITROGLYCERIN 0.4 MG/1
TABLET SUBLINGUAL
Qty: 100 TABLET | Refills: 5 | Status: SHIPPED | OUTPATIENT
Start: 2024-08-01

## 2024-08-01 RX ORDER — ATORVASTATIN CALCIUM 80 MG/1
80 TABLET, FILM COATED ORAL DAILY
Qty: 90 TABLET | Refills: 3 | Status: CANCELLED | OUTPATIENT
Start: 2024-08-01

## 2024-08-01 RX ORDER — ALPRAZOLAM 1 MG/1
1 TABLET ORAL 3 TIMES DAILY PRN
Qty: 270 TABLET | Refills: 0 | Status: SHIPPED | OUTPATIENT
Start: 2024-08-01

## 2024-08-05 DIAGNOSIS — I10 ESSENTIAL HYPERTENSION: ICD-10-CM

## 2024-08-05 RX ORDER — AMLODIPINE BESYLATE 5 MG/1
5 TABLET ORAL DAILY
Qty: 90 TABLET | Refills: 1 | Status: SHIPPED | OUTPATIENT
Start: 2024-08-05

## 2024-08-06 ENCOUNTER — LAB (OUTPATIENT)
Dept: LAB | Facility: CLINIC | Age: 59
End: 2024-08-06
Payer: COMMERCIAL

## 2024-08-06 DIAGNOSIS — I10 ESSENTIAL HYPERTENSION: ICD-10-CM

## 2024-08-06 DIAGNOSIS — E55.9 VITAMIN D DEFICIENCY, UNSPECIFIED: ICD-10-CM

## 2024-08-06 DIAGNOSIS — R53.83 OTHER FATIGUE: ICD-10-CM

## 2024-08-06 DIAGNOSIS — K50.90 CROHN'S DISEASE WITHOUT COMPLICATION, UNSPECIFIED GASTROINTESTINAL TRACT LOCATION (HCC): ICD-10-CM

## 2024-08-06 DIAGNOSIS — E78.2 MIXED HYPERLIPIDEMIA: ICD-10-CM

## 2024-08-06 LAB
25(OH)D3 SERPL-MCNC: 30.7 NG/ML (ref 30–100)
ALBUMIN SERPL BCG-MCNC: 4.1 G/DL (ref 3.5–5)
ALP SERPL-CCNC: 87 U/L (ref 34–104)
ALT SERPL W P-5'-P-CCNC: 15 U/L (ref 7–52)
ANION GAP SERPL CALCULATED.3IONS-SCNC: 10 MMOL/L (ref 4–13)
AST SERPL W P-5'-P-CCNC: 19 U/L (ref 13–39)
BASOPHILS # BLD AUTO: 0.08 THOUSANDS/ÂΜL (ref 0–0.1)
BASOPHILS NFR BLD AUTO: 1 % (ref 0–1)
BILIRUB SERPL-MCNC: 0.52 MG/DL (ref 0.2–1)
BUN SERPL-MCNC: 15 MG/DL (ref 5–25)
CALCIUM SERPL-MCNC: 9.2 MG/DL (ref 8.4–10.2)
CHLORIDE SERPL-SCNC: 101 MMOL/L (ref 96–108)
CHOLEST SERPL-MCNC: 117 MG/DL
CO2 SERPL-SCNC: 27 MMOL/L (ref 21–32)
CREAT SERPL-MCNC: 0.9 MG/DL (ref 0.6–1.3)
EOSINOPHIL # BLD AUTO: 0.11 THOUSAND/ÂΜL (ref 0–0.61)
EOSINOPHIL NFR BLD AUTO: 2 % (ref 0–6)
ERYTHROCYTE [DISTWIDTH] IN BLOOD BY AUTOMATED COUNT: 12.3 % (ref 11.6–15.1)
GFR SERPL CREATININE-BSD FRML MDRD: 71 ML/MIN/1.73SQ M
GLUCOSE P FAST SERPL-MCNC: 84 MG/DL (ref 65–99)
HCT VFR BLD AUTO: 42.9 % (ref 34.8–46.1)
HDLC SERPL-MCNC: 49 MG/DL
HGB BLD-MCNC: 14.3 G/DL (ref 11.5–15.4)
IMM GRANULOCYTES # BLD AUTO: 0.03 THOUSAND/UL (ref 0–0.2)
IMM GRANULOCYTES NFR BLD AUTO: 0 % (ref 0–2)
LDLC SERPL CALC-MCNC: 45 MG/DL (ref 0–100)
LYMPHOCYTES # BLD AUTO: 1.35 THOUSANDS/ÂΜL (ref 0.6–4.47)
LYMPHOCYTES NFR BLD AUTO: 20 % (ref 14–44)
MCH RBC QN AUTO: 29.9 PG (ref 26.8–34.3)
MCHC RBC AUTO-ENTMCNC: 33.3 G/DL (ref 31.4–37.4)
MCV RBC AUTO: 90 FL (ref 82–98)
MONOCYTES # BLD AUTO: 0.48 THOUSAND/ÂΜL (ref 0.17–1.22)
MONOCYTES NFR BLD AUTO: 7 % (ref 4–12)
NEUTROPHILS # BLD AUTO: 4.7 THOUSANDS/ÂΜL (ref 1.85–7.62)
NEUTS SEG NFR BLD AUTO: 70 % (ref 43–75)
NONHDLC SERPL-MCNC: 68 MG/DL
NRBC BLD AUTO-RTO: 0 /100 WBCS
PLATELET # BLD AUTO: 219 THOUSANDS/UL (ref 149–390)
PMV BLD AUTO: 9.9 FL (ref 8.9–12.7)
POTASSIUM SERPL-SCNC: 4.2 MMOL/L (ref 3.5–5.3)
PROT SERPL-MCNC: 6.6 G/DL (ref 6.4–8.4)
RBC # BLD AUTO: 4.78 MILLION/UL (ref 3.81–5.12)
SODIUM SERPL-SCNC: 138 MMOL/L (ref 135–147)
TRIGL SERPL-MCNC: 113 MG/DL
TSH SERPL DL<=0.05 MIU/L-ACNC: 3.81 UIU/ML (ref 0.45–4.5)
VIT B12 SERPL-MCNC: 494 PG/ML (ref 180–914)
WBC # BLD AUTO: 6.75 THOUSAND/UL (ref 4.31–10.16)

## 2024-08-06 PROCEDURE — 80053 COMPREHEN METABOLIC PANEL: CPT

## 2024-08-06 PROCEDURE — 85025 COMPLETE CBC W/AUTO DIFF WBC: CPT

## 2024-08-06 PROCEDURE — 84443 ASSAY THYROID STIM HORMONE: CPT

## 2024-08-06 PROCEDURE — 80061 LIPID PANEL: CPT

## 2024-08-06 PROCEDURE — 82306 VITAMIN D 25 HYDROXY: CPT

## 2024-08-06 PROCEDURE — 36415 COLL VENOUS BLD VENIPUNCTURE: CPT

## 2024-08-06 PROCEDURE — 82607 VITAMIN B-12: CPT

## 2024-08-07 ENCOUNTER — TELEPHONE (OUTPATIENT)
Dept: FAMILY MEDICINE CLINIC | Facility: CLINIC | Age: 59
End: 2024-08-07

## 2024-09-16 ENCOUNTER — NURSE TRIAGE (OUTPATIENT)
Age: 59
End: 2024-09-16

## 2024-09-16 DIAGNOSIS — R09.89 CHRONIC THROAT CLEARING: ICD-10-CM

## 2024-09-16 DIAGNOSIS — R10.13 EPIGASTRIC ABDOMINAL PAIN: ICD-10-CM

## 2024-09-16 DIAGNOSIS — R13.10 DYSPHAGIA, UNSPECIFIED TYPE: ICD-10-CM

## 2024-09-16 NOTE — TELEPHONE ENCOUNTER
----- Message from Jerardo A sent at 9/16/2024  8:27 AM EDT -----  Please contact patient on the home telephone number: [phone]    ----- Message from Jerardo A sent at 9/16/2024  8:25 AM EDT -----  Sunday Gagnon [phone] contacting office requesting to speak with clinical staff experiencing more frequent fluttering and extreme fatigue.

## 2024-09-16 NOTE — TELEPHONE ENCOUNTER
"Chief Complaint: pt experiencing increased palpitations/fluttering and asking to be seen. Scheduled for next available with Dr Agarwal 10/31/24. Pt is also scheduled to see PCP next week.     History of Present Illness (HPI): feeling more fluttering that normal    VS/Weight: HR 75    Risk Factors: Arrhythmia    Recent Testing: holter 4/11/2023, stress testing 11/2022    Medicine: Norvasc 5 mg, Lopressor 50 mg BID, Lipitor    Upcoming Office Visit: Yes, 10/31/24    Last Office Visit: 9/19/23    Reason for Disposition  • Palpitations are a chronic symptom (recurrent or ongoing AND present > 4 weeks)    Answer Assessment - Initial Assessment Questions  1. DESCRIPTION: \"Please describe your heart rate or heartbeat that you are having\" (e.g., fast/slow, regular/irregular, skipped or extra beats, \"palpitations\")      Fluttering   2. ONSET: \"When did it start?\" (Minutes, hours or days)       Month ago felt worse  3. DURATION: \"How long does it last\" (e.g., seconds, minutes, hours)      seconds  4. PATTERN \"Does it come and go, or has it been constant since it started?\"  \"Does it get worse with exertion?\"   \"Are you feeling it now?\"      Comes and goes  5. TAP: \"Using your hand, can you tap out what you are feeling on a chair or table in front of you, so that I can hear?\" (Note: not all patients can do this)          6. HEART RATE: \"Can you tell me your heart rate?\" \"How many beats in 15 seconds?\"  (Note: not all patients can do this)        HR 75  7. RECURRENT SYMPTOM: \"Have you ever had this before?\" If Yes, ask: \"When was the last time?\" and \"What happened that time?\"       ongoing  8. CAUSE: \"What do you think is causing the palpitations?\"      stress  9. CARDIAC HISTORY: \"Do you have any history of heart disease?\" (e.g., heart attack, angina, bypass surgery, angioplasty, arrhythmia)       JAYA  10. OTHER SYMPTOMS: \"Do you have any other symptoms?\" (e.g., dizziness, chest pain, sweating, difficulty breathing)        " Occasional dizziness    Protocols used: Heart Rate and Heartbeat Questions-ADULT-[place]

## 2024-09-16 NOTE — TELEPHONE ENCOUNTER
----- Message from Yana SEAY sent at 9/16/2024  8:12 AM EDT -----  Patient has been experiencing fluttering of the heart and extreme fatigue within the past few weeks.  Please call the patient back at [phone] and leave a message if the patient does not answer.

## 2024-09-19 ENCOUNTER — RA CDI HCC (OUTPATIENT)
Dept: OTHER | Facility: HOSPITAL | Age: 59
End: 2024-09-19

## 2024-09-19 DIAGNOSIS — F51.01 PRIMARY INSOMNIA: ICD-10-CM

## 2024-09-19 RX ORDER — TRAZODONE HYDROCHLORIDE 100 MG/1
100 TABLET ORAL
Qty: 90 TABLET | Refills: 3 | Status: SHIPPED | OUTPATIENT
Start: 2024-09-19

## 2024-09-24 DIAGNOSIS — I25.10 CORONARY ARTERY DISEASE INVOLVING NATIVE CORONARY ARTERY OF NATIVE HEART WITHOUT ANGINA PECTORIS: ICD-10-CM

## 2024-09-24 RX ORDER — METOPROLOL TARTRATE 50 MG
50 TABLET ORAL 2 TIMES DAILY
Qty: 180 TABLET | Refills: 3 | Status: SHIPPED | OUTPATIENT
Start: 2024-09-24

## 2024-09-25 ENCOUNTER — VBI (OUTPATIENT)
Dept: ADMINISTRATIVE | Facility: OTHER | Age: 59
End: 2024-09-25

## 2024-09-25 NOTE — TELEPHONE ENCOUNTER
09/25/24 2:27 PM     Chart reviewed for Mammogram was/were not submitted to the patient's insurance.     Anna Austin MA   [facility]

## 2024-09-30 ENCOUNTER — OFFICE VISIT (OUTPATIENT)
Dept: FAMILY MEDICINE CLINIC | Facility: CLINIC | Age: 59
End: 2024-09-30
Payer: COMMERCIAL

## 2024-09-30 VITALS
WEIGHT: 164 LBS | DIASTOLIC BLOOD PRESSURE: 86 MMHG | HEART RATE: 75 BPM | OXYGEN SATURATION: 98 % | RESPIRATION RATE: 18 BRPM | SYSTOLIC BLOOD PRESSURE: 120 MMHG | BODY MASS INDEX: 30.99 KG/M2

## 2024-09-30 DIAGNOSIS — F11.20 CONTINUOUS OPIOID DEPENDENCE (HCC): ICD-10-CM

## 2024-09-30 DIAGNOSIS — I25.119 CORONARY ARTERY DISEASE INVOLVING NATIVE CORONARY ARTERY OF NATIVE HEART WITH ANGINA PECTORIS (HCC): ICD-10-CM

## 2024-09-30 DIAGNOSIS — R00.2 PALPITATIONS: Primary | ICD-10-CM

## 2024-09-30 DIAGNOSIS — Z12.31 SCREENING MAMMOGRAM FOR BREAST CANCER: ICD-10-CM

## 2024-09-30 DIAGNOSIS — J42 CHRONIC BRONCHITIS, UNSPECIFIED CHRONIC BRONCHITIS TYPE (HCC): ICD-10-CM

## 2024-09-30 PROCEDURE — 99214 OFFICE O/P EST MOD 30 MIN: CPT | Performed by: FAMILY MEDICINE

## 2024-09-30 RX ORDER — ASPIRIN 81 MG/1
TABLET ORAL
COMMUNITY

## 2024-09-30 NOTE — PROGRESS NOTES
Ambulatory Visit  Name: Chelsi Porter      : 1965      MRN: [id number]  Encounter Provider: Uziel Blackmon MD  Encounter Date: 2024   Encounter department: [facility]    Assessment & Plan  Palpitations    Orders:    Holter monitor; Future    Screening mammogram for breast cancer    Orders:    Mammo screening bilateral w 3d and cad; Future    Continuous opioid dependence (HCC)         Chronic bronchitis, unspecified chronic bronchitis type (HCC)         Coronary artery disease involving native coronary artery of native heart with angina pectoris (HCC)            History of Present Illness     She is having more palpitations.  She is not symptomatic.  She sees cardiology in a week or two and does not want anything done before then.    Her BP is at goal on her current regimen.    Her lipids are at goal.  She has normal LFTs and no myalgia or muscle weakness.          Review of Systems   All other systems reviewed and are negative.          Objective     /86 (BP Location: Left arm)   Pulse 75   Resp 18   Wt 74.4 kg (164 lb)   SpO2 98%   BMI 30.99 kg/m²     Physical Exam  Vitals and nursing note reviewed.   Constitutional:       Appearance: Normal appearance. She is obese.   Cardiovascular:      Rate and Rhythm: Normal rate and regular rhythm.      Pulses: Normal pulses.      Heart sounds: Normal heart sounds.   Pulmonary:      Effort: Pulmonary effort is normal.      Breath sounds: Normal breath sounds.   Abdominal:      General: Abdomen is flat. Bowel sounds are normal.      Palpations: Abdomen is soft.   Musculoskeletal:      Cervical back: Normal range of motion and neck supple.   Skin:     General: Skin is warm and dry.   Neurological:      General: No focal deficit present.      Mental Status: She is alert and oriented to person, place, and time. Mental status is at baseline.   Psychiatric:         Mood and Affect: Mood normal.         Behavior: Behavior  normal.         Thought Content: Thought content normal.         Judgment: Judgment normal.

## 2024-10-01 ENCOUNTER — TELEPHONE (OUTPATIENT)
Dept: BEHAVIORAL/MENTAL HEALTH CLINIC | Facility: CLINIC | Age: 59
End: 2024-10-01

## 2024-10-01 NOTE — TELEPHONE ENCOUNTER
Attempted to contact the client to schedule an appointment she was asked to contact the [facility] office to reschedule at [phone]

## 2024-10-03 ENCOUNTER — TELEPHONE (OUTPATIENT)
Dept: BEHAVIORAL/MENTAL HEALTH CLINIC | Facility: CLINIC | Age: 59
End: 2024-10-03

## 2024-10-03 NOTE — TELEPHONE ENCOUNTER
Attempted to contact the client to schedule an intake into  appointment, Message was left for the client to contact this therapist at the [facility] Office to schedule an appointment

## 2024-10-25 ENCOUNTER — OFFICE VISIT (OUTPATIENT)
Dept: GASTROENTEROLOGY | Facility: CLINIC | Age: 59
End: 2024-10-25
Payer: COMMERCIAL

## 2024-10-25 VITALS
SYSTOLIC BLOOD PRESSURE: 106 MMHG | HEART RATE: 80 BPM | HEIGHT: 61 IN | TEMPERATURE: 98.6 F | DIASTOLIC BLOOD PRESSURE: 70 MMHG | BODY MASS INDEX: 31.15 KG/M2 | WEIGHT: 165 LBS | OXYGEN SATURATION: 97 %

## 2024-10-25 DIAGNOSIS — R13.10 DYSPHAGIA, UNSPECIFIED TYPE: ICD-10-CM

## 2024-10-25 DIAGNOSIS — K50.90 CROHN'S DISEASE WITHOUT COMPLICATION, UNSPECIFIED GASTROINTESTINAL TRACT LOCATION (HCC): Primary | ICD-10-CM

## 2024-10-25 DIAGNOSIS — K52.9 ILEITIS: ICD-10-CM

## 2024-10-25 DIAGNOSIS — R93.5 ABNORMAL MRI OF ABDOMEN: ICD-10-CM

## 2024-10-25 DIAGNOSIS — R10.13 EPIGASTRIC ABDOMINAL PAIN: ICD-10-CM

## 2024-10-25 PROCEDURE — 99214 OFFICE O/P EST MOD 30 MIN: CPT | Performed by: PHYSICIAN ASSISTANT

## 2024-10-25 NOTE — PROGRESS NOTES
[facility] - Outpatient Follow-up Note  Chelsi Porter 59 y.o. female MRN: [id number]  Encounter: [id number]    ASSESSMENT AND PLAN:      1. Crohn's disease without complication, unspecified gastrointestinal tract location (HCC)  2. Ileitis  3. Abnormal MRI of abdomen    Patient with newly diagnosed Crohn's disease in 2023, primarily involving TI.  She was started on Stelara and has had improvement in GI symptoms.  She is due for yearly biologic blood work including Quant Gold and Hep B.  At today's office visit, patient shares that she will be discontinuing Stelara.  She no longer wants to be on a biologic medication.  She is interested in trial of natural remedies to treat her IBD. She understands that discontinuing the medication may result in recurrence of active inflammatory changes to the bowel, risk of complications such as stricture, fistula, abscess, etc., as well as risk of bowel obstruction and perforation.  Furthermore, she declines any additional colonoscopies, as I originally had recommended that 1 would be indicated 1 to 2 years after initiation of biologic to assess for response to medication.  She understands that declining colonoscopy may result in delayed diagnosis of significant pathology including malignancy.  I will update Dr Faulkner our IBD specialist   Healthcare maintenance recs outlined below:     Health Maintenance Recommendations:  Vaccines & Infections  COVID-19 vaccination is recommended when eligible. There is no evidence that the COVID-19 vaccine would cause an IBD flare.  Avoid live vaccines if on immunosuppressive therapy. '  Yearly influenza vaccine (flu shot).  Pneumonia vaccines. These include Prevnar 13, followed by Pneumovax at least 8 weeks later. A Pneumovax booster should be given 5 years after.  Shingrix vaccine - series of 2 injections.  If not immune to measles mumps or rubella, MMR vaccine is recommended. However, this is a live vaccine  and should be given prior to immunosuppressive therapy.  HPV vaccination as per national guidelines.  Hepatitis A and B vaccinations if not previously vaccinated.  Testing for tuberculosis with QuantiFERON Gold blood test and/or chest xray prior to starting immunosuppressive medications, and then annually    Cancer screening  Dysplasia surveillance for colorectal cancer. Colonoscopy in all patients with extensive colitis (more than 1/3 of the colon involved) who had disease for at least 8 or more years.  Repeat colonoscopy approximately every 12-24 months.  In patients with concurrent primary sclerosing cholangitis, history of dysplasia, or family history of colon cancer, repeat colonoscopy annually.    Females: Pap smear annually, especially for woman on immunosuppression.  Annual dermatologic/skin exam in all patients with IBD, especially those on immunosuppression including anti-TNF therapy and/or thiopurines.    Miscellaneous  DEXA scan, once off steroids for 3 months  Depression screening recommended annually  Routine dental and ophthalmology examinations    4. Epigastric abdominal pain  5. Dysphagia, unspecified type    Patient does have a history of upper GI symptoms, though at present they are quiescent.  Her last EGD in 05/2023 was macroscopically unremarkable, and biopsies were negative for H. pylori, intestinal metaplasia, EoE, etc.  She is not having any significant heartburn or dysphagia issues at present, and we did review we could cautiously trial to taper her off PPI.  Would recommend de-escalating to every other day dosing and then trying to discontinue.  If her symptoms recur, would recommend restarting PPI.  Continue small frequent meals and diet and lifestyle modifications for GERD.  Continue with dysphagia precautions.    Patient was agreeable to a 6-month follow-up, and I encouraged her to reach out to clinic should she have any other questions or should her symptoms  "worsen.  ______________________________________________________________________    SUBJECTIVE: Patient is a 59 y.o. female who presents today for follow-up regarding Crohn's disease. Pmhx sig for Crohn's disease, CAD, GERD, HTN, HLD, anxiety.      Pt was last evaluated in clinic in 08/2023. She had ileitis on MRI and colonosocpy that demonstrated Crohn's disease.  She was started on Stelara.    10/25/24:     Patient has been feeling well from a GI standpoint.  She is having formed brown stool daily.  She is not having any abdominal pain.  She is not dealing with constipation or diarrhea.  She feels she is completely evacuating.  She denies any BRBPR or melena.  No nocturnal BMs.  No abnormal weight loss over the past 6 months, has been dealing with weight gain. Wants to d/c Stelara.     Pt denies any heartburn, indigestion, nausea, vomiting, or odynophagia.  Having a little bit of dysphagia which is a chronic issue for her, thinks this improving. No early satiety.    Pt denies joint erythema, recurrent rashes, recurrent mouth ulcers, recurrent eye infections.     NSAIDs: avoids   Tobacco: vapes nicotine \"occasionally\"   Etoh: none      04/2023: MRI: No suspicious liver mass.Stable 6 mm posterior right hepatic lobe lesion. Although definitive diagnosis is limited due to small size, this is probably a tiny flash filling hemangioma, not significantly changed in size or appearance from 2019.Hyperenhancement of the wall of the terminal ileum without evidence for stricture suggestive of ileitis. Multiple prominent right lower quadrant lymph nodes, likely reactive. Correlate for history of inflammatory bowel disease.  08/2023: MRI enterography: Mild active inflammatory bowel disease involving a 7 to 10 cm segment of terminal ileum with superimposed features of mild chronic inflammatory bowel disease.  No other segments of active or chronic inflammatory bowel disease. No obstructive stricturing, abscess, or fistula " "formation.  08/2024: Hb 14.3, MCV 90, Plt 219, BUN 15, Cr 0.90, AST 19, ALT 15, ALP 87, albumin 4.1, t bili 0.52      Endoscopic history:   EGD: 05/2023: normal   A. Stomach, cold fcp bx r/o h. pylori: Gastric antral mucosa with mild chronic, inactive gastritis. Negative for intestinal metaplasia, dysplasia or carcinoma. No Helicobacter pylori is identified on H&E stained slides.  B. Esophagus, cold fcp bx proximal r/o EOE: Esophageal squamous mucosa with no significant pathologic alteration. No increase in intraepithelial eosinophils is seen  Colon: 05/2023: Moderate, generalized scarred and ulcerated mucosa in the terminal ileum; Few small diverticula in the sigmoid colon  C. \"Terminal Ileum\", cold fcp bx r/o crohn's: Colonic mucosa with marked denudation and reactive changes. Acutely inflamed granulation tissue and inflammatory debris, consistent with ulcer bed. No histologic evidence of chronic mucosal injury. Negative for dysplasia  E. Large Intestine, Cecum, cold fcp bx r/o Crohn's: Benign colonic mucosa with no pathologic abnormality; Negative for active and chronic colitis. Negative for dysplasia.  F. Large Intestine, Right/Ascending Colon, cold fcp bx r/o Crohn's: Benign colonic mucosa with no pathologic abnormality; Negative for active and chronic colitis. Negative for dysplasia.   G. Large Intestine, Transverse Colon, cold fcp bx r/o Crohn's: Benign colonic mucosa with no pathologic abnormality; Negative for active and chronic colitis. Negative for dysplasia.   H. Large Intestine, Left/Descending Colon, cold fcp bx r/o Crohn's: Benign colonic mucosa with no pathologic abnormality; Negative for active and chronic colitis. Negative for dysplasia.   I. Large Intestine, Sigmoid Colon, cold fcp bx r/o Crohn's: Benign colonic mucosa with no pathologic abnormality; Negative for active and chronic colitis. Negative for dysplasia.   J. Rectum, cold fcp bx r/o Crohn's: Benign colonic mucosa with no pathologic " "abnormality; Negative for active and chronic colitis. Negative for dysplasia    Review of Systems   Otherwise Per HPI    Historical Information   Past Medical History:   Diagnosis Date    Angina pectoris (HCC)     Anxiety     BMI 34.0-34.9,adult 03/29/2019    Crohn disease ([facility])     GERD (gastroesophageal reflux disease)     Myocardial infarction ([facility])     1/24/16    NSTEMI (non-ST elevated myocardial infarction) ([facility]) 01/24/2016    Overview:  Max trop I 1.77([facility]) w/nml LVEF and wall motion.     Shortness of breath      Past Surgical History:   Procedure Laterality Date    CARDIAC SURGERY      CORONARY ANGIOPLASTY WITH STENT PLACEMENT      TOE SURGERY      TUBAL LIGATION      TUBAL LIGATION       Social History   Social History     Substance and Sexual Activity   Alcohol Use No     Social History     Substance and Sexual Activity   Drug Use No     Social History     Tobacco Use   Smoking Status Former    Types: Cigarettes   Smokeless Tobacco Never   Tobacco Comments    Quit 2021     Family History   Problem Relation Age of Onset    Atrial fibrillation Mother     Heart failure Mother     Diabetes type II Mother     Hypertension Mother     Lung disease Mother         Pleural Effusion    Hypertension Father      Meds/Allergies       Current Outpatient Medications:     ALPRAZolam (XANAX) 1 mg tablet    amLODIPine (NORVASC) 5 mg tablet    aspirin (Aspirin Adult Low Dose) 81 mg EC tablet    atorvastatin (LIPITOR) 80 mg tablet    fexofenadine (ALLEGRA) 180 MG tablet    metoprolol tartrate (LOPRESSOR) 50 mg tablet    montelukast (SINGULAIR) 10 mg tablet    nitroglycerin (NITROSTAT) 0.4 mg SL tablet    Omega-3 Fatty Acids (fish oil) 1,000 mg    omeprazole (PriLOSEC) 20 mg delayed release capsule    Stelara 90 MG/ML subcutaneous injection    traMADol (ULTRAM) 50 mg tablet    No Known Allergies    Objective     Blood pressure 106/70, pulse 80, temperature 98.6 °F (37 °C), temperature source Temporal, height 5' 1\" (1.549 m), " weight 74.8 kg (165 lb), SpO2 97%. Body mass index is 31.18 kg/m².    Physical Exam  Vitals and nursing note reviewed.   Constitutional:       General: She is not in acute distress.     Appearance: She is well-developed.   HENT:      Head: Normocephalic and atraumatic.   Eyes:      General: No scleral icterus.     Conjunctiva/sclera: Conjunctivae normal.   Cardiovascular:      Rate and Rhythm: Normal rate.      Heart sounds: No murmur heard.  Pulmonary:      Effort: Pulmonary effort is normal. No respiratory distress.   Abdominal:      General: Bowel sounds are normal. There is no distension.      Palpations: Abdomen is soft.      Tenderness: There is no abdominal tenderness. There is no guarding or rebound.   Skin:     General: Skin is warm and dry.      Coloration: Skin is not jaundiced.   Neurological:      General: No focal deficit present.      Mental Status: She is alert.   Psychiatric:         Mood and Affect: Mood normal.         Behavior: Behavior normal.       Lab Results:   No visits with results within 1 Day(s) from this visit.   Latest known visit with results is:   Lab on 08/06/2024   Component Date Value    WBC 08/06/2024 6.75     RBC 08/06/2024 4.78     Hemoglobin 08/06/2024 14.3     Hematocrit 08/06/2024 42.9     MCV 08/06/2024 90     MCH 08/06/2024 29.9     MCHC 08/06/2024 33.3     RDW 08/06/2024 12.3     MPV 08/06/2024 9.9     Platelets 08/06/2024 219     nRBC 08/06/2024 0     Segmented % 08/06/2024 70     Immature Grans % 08/06/2024 0     Lymphocytes % 08/06/2024 20     Monocytes % 08/06/2024 7     Eosinophils Relative 08/06/2024 2     Basophils Relative 08/06/2024 1     Absolute Neutrophils 08/06/2024 4.70     Absolute Immature Grans 08/06/2024 0.03     Absolute Lymphocytes 08/06/2024 1.35     Absolute Monocytes 08/06/2024 0.48     Eosinophils Absolute 08/06/2024 0.11     Basophils Absolute 08/06/2024 0.08     Sodium 08/06/2024 138     Potassium 08/06/2024 4.2     Chloride 08/06/2024 101     CO2  08/06/2024 27     ANION GAP 08/06/2024 10     BUN 08/06/2024 15     Creatinine 08/06/2024 0.90     Glucose, Fasting 08/06/2024 84     Calcium 08/06/2024 9.2     AST 08/06/2024 19     ALT 08/06/2024 15     Alkaline Phosphatase 08/06/2024 87     Total Protein 08/06/2024 6.6     Albumin 08/06/2024 4.1     Total Bilirubin 08/06/2024 0.52     eGFR 08/06/2024 71     Cholesterol 08/06/2024 117     Triglycerides 08/06/2024 113     HDL, Direct 08/06/2024 49 (L)     LDL Calculated 08/06/2024 45     Non-HDL-Chol (CHOL-HDL) 08/06/2024 68     TSH 3RD GENERATON 08/06/2024 3.814     Vit D, 25-Hydroxy 08/06/2024 30.7     Vitamin B-12 08/06/2024 494      Radiology Results:   No results found.    Jodi Copeland PA-C    **Please note:  Dictation voice to text software may have been used in the creation of this record.  Occasional wrong word or “sound alike” substitutions may have occurred due to the inherent limitations of voice recognition software.  Read the chart carefully and recognize, using context, where substitutions have occurred.**

## 2024-10-25 NOTE — PATIENT INSTRUCTIONS
You can try to slowly cut back on omeprazole, taking every other morning and then discontinuing. If you have heartburn, please restart medication.   If swallowing issues worsen, we can check video swallow.     I will update dr Faulkner regarding your decision to discontinue the Stelara.   If you have worsening symptoms please reach out to us.

## 2024-10-31 ENCOUNTER — OFFICE VISIT (OUTPATIENT)
Dept: CARDIOLOGY CLINIC | Facility: HOSPITAL | Age: 59
End: 2024-10-31
Payer: COMMERCIAL

## 2024-10-31 VITALS
OXYGEN SATURATION: 98 % | SYSTOLIC BLOOD PRESSURE: 114 MMHG | HEART RATE: 75 BPM | HEIGHT: 61 IN | DIASTOLIC BLOOD PRESSURE: 76 MMHG | BODY MASS INDEX: 31.72 KG/M2 | WEIGHT: 168 LBS

## 2024-10-31 DIAGNOSIS — R00.2 PALPITATIONS: ICD-10-CM

## 2024-10-31 DIAGNOSIS — Z95.5 S/P BARE METAL CORONARY ARTERY STENT: ICD-10-CM

## 2024-10-31 DIAGNOSIS — I25.119 CORONARY ARTERY DISEASE INVOLVING NATIVE CORONARY ARTERY OF NATIVE HEART WITH ANGINA PECTORIS (HCC): Primary | ICD-10-CM

## 2024-10-31 DIAGNOSIS — E78.2 MIXED HYPERLIPIDEMIA: ICD-10-CM

## 2024-10-31 PROCEDURE — 99214 OFFICE O/P EST MOD 30 MIN: CPT | Performed by: INTERNAL MEDICINE

## 2024-10-31 PROCEDURE — 93000 ELECTROCARDIOGRAM COMPLETE: CPT | Performed by: INTERNAL MEDICINE

## 2024-10-31 RX ORDER — AMOXICILLIN 500 MG/1
2000 CAPSULE ORAL ONCE
Qty: 4 CAPSULE | Refills: 1 | Status: CANCELLED | OUTPATIENT
Start: 2024-10-31 | End: 2024-10-31

## 2024-10-31 NOTE — PROGRESS NOTES
Chelsi Porter  1965  [id number]  CARDIOVASC PHYSICIAN  [address] [facility] [address]  [phone]  [phone]    1. Coronary artery disease involving native coronary artery of native heart with angina pectoris (HCC)  POCT ECG      2. S/p bare metal coronary artery stent        3. Palpitations  POCT ECG      4. Mixed hyperlipidemia  POCT ECG          Discussion/Summary:   1. Coronary artery disease prior PCI with bare metal stent to 1st diagonal in 2016, last catheterization 2019 showed no obstructive lesions mild to moderate residual disease   2. Preserved LV systolic function   3.  Tobacco abuse   4. Hyperlipidemia   5. Palpitations    Recommendations: Overall she has been doing well coronary disease stable no complaints of angina.  Blood pressure is well-controlled lipids have been doing well with an LDL in the 40s.  Unfortunately she continues to vape recommend that she quit all tobacco products.      Interval History:   59-year-old female who had non-STEMI in 2016 receiving a bare metal stent to the 1st diagonal, subsequent catheterization in 2019 showed no obstructive lesions requiring PCI, hyperlipidemia, tobacco abuse presents to establish care with me in the office.  She has been under some stress recently in throughout COVID during some episode she has had some chest pain with radiation up towards the neck associated with palpitations.  Denies any significant dyspnea.  There has been no lower extremity edema, PND, orthopnea.  Overall she has been fairly active around the house she has been taking medications as prescribed.  She has lost a significant amount of weight and is also trying to quit smoking.  She is down to only 1 or 2  Cigarettes a day    Overall she is doing well denies any chest pain, shortness of breath, palpitations, lightheadedness, dizziness, or syncope.  Dyspnea lower extreme edema, PND, orthopnea.  She has been taking her medications as prescribed.  Follows a low-sodium  diet.              Medical Problems       Problem List       Anxiety    ASCVD (arteriosclerotic cardiovascular disease)    Overview Signed 1/15/2019  7:53 AM by Uziel Blackmon MD     Atypical chest pain         HLD (hyperlipidemia)    S/p bare metal coronary artery stent    Overview Signed 4/27/2018  8:34 AM by Uziel Blackmon MD     Overview:   2.0 x 15mm bare metal stent ([employer]) 1/25/2016 for severe diag lesion and NSTEMI w/preceeding rest angina. [facility].          Status post non-ST elevation myocardial infarction (NSTEMI)    Fatty food intolerance    Overview Deleted 6/21/2019  8:32 AM by Uziel Blackmon MD            Left foot pain    Palpitations    Abnormal CT of the abdomen    Coronary artery disease involving native coronary artery of native heart with angina pectoris (HCC)    Overview Signed 4/30/2019 11:00 AM by PITO Stubbs     Mid Circumflex stenosis 30%         Low back pain    Sciatica    Gastroesophageal reflux disease    Family history of colonic polyps    Chronic bronchitis, unspecified chronic bronchitis type ([facility])    Dislocation of tarsometatarsal joint of right foot                  Past Medical History:   Diagnosis Date    Angina pectoris (HCC)     Anxiety     BMI 34.0-34.9,adult 03/29/2019    Crohn disease ([facility])     GERD (gastroesophageal reflux disease)     Myocardial infarction ([facility])     1/24/16    NSTEMI (non-ST elevated myocardial infarction) ([facility]) 01/24/2016    Overview:  Max trop I 1.77([facility]) w/nml LVEF and wall motion.     Shortness of breath      Social History     Socioeconomic History    Marital status:      Spouse name: Not on file    Number of children: Not on file    Years of education: Not on file    Highest education level: Not on file   Occupational History    Not on file   Tobacco Use    Smoking status: Former     Types: Cigarettes    Smokeless tobacco: Never    Tobacco comments:     Quit 2021   Vaping Use    Vaping status: Every Day    Substances:  Nicotine, Flavoring   Substance and Sexual Activity    Alcohol use: No    Drug use: No    Sexual activity: Not Currently     Partners: Male   Other Topics Concern    Not on file   Social History Narrative    Not on file     Social Determinants of Health     Financial Resource Strain: High Risk (1/3/2024)    Overall Financial Resource Strain (CARDIA)     Difficulty of Paying Living Expenses: Hard   Food Insecurity: Not on file   Transportation Needs: No Transportation Needs (1/3/2024)    PRAPARE - Transportation     Lack of Transportation (Medical): No     Lack of Transportation (Non-Medical): No   Physical Activity: Not on file   Stress: Not on file   Social Connections: Unknown (6/18/2024)    Received from [employer]     How often do you feel lonely or isolated from those around you? (Adult - for ages 18 years and over): Not on file   Intimate Partner Violence: Not At Risk (2/18/2024)    Received from ,     Domestic Violence Screen     Physical Abuse Risk: Not At Risk     Verbal Abuse Risk: Not At Risk   Housing Stability: Not on file      Family History   Problem Relation Age of Onset    Atrial fibrillation Mother     Heart failure Mother     Diabetes type II Mother     Hypertension Mother     Lung disease Mother         Pleural Effusion    Hypertension Father      Past Surgical History:   Procedure Laterality Date    CARDIAC SURGERY      CORONARY ANGIOPLASTY WITH STENT PLACEMENT      TOE SURGERY      TUBAL LIGATION      TUBAL LIGATION         Current Outpatient Medications:     ALPRAZolam (XANAX) 1 mg tablet, Take 1 tablet (1 mg total) by mouth 3 (three) times a day as needed for anxiety, Disp: 270 tablet, Rfl: 0    amLODIPine (NORVASC) 5 mg tablet, Take 1 tablet (5 mg total) by mouth daily, Disp: 90 tablet, Rfl: 1    aspirin (Aspirin Adult Low Dose) 81 mg EC tablet, , Disp: , Rfl:     atorvastatin (LIPITOR) 80 mg tablet, Take 1 tablet (80 mg total)  "by mouth daily, Disp: 90 tablet, Rfl: 3    fexofenadine (ALLEGRA) 180 MG tablet, Take 1 tablet (180 mg total) by mouth daily, Disp: 180 tablet, Rfl: 3    metoprolol tartrate (LOPRESSOR) 50 mg tablet, Take 1 tablet (50 mg total) by mouth 2 (two) times a day, Disp: 180 tablet, Rfl: 3    montelukast (SINGULAIR) 10 mg tablet, take 1 tablet by mouth at bedtime, Disp: 90 tablet, Rfl: 3    nitroglycerin (NITROSTAT) 0.4 mg SL tablet, place 1 tablet under the tongue if needed every 5 minutes for chest pain for 3 doses IF NO RELIEF AFTER THIRD DOSE CALL 911., Disp: 100 tablet, Rfl: 5    Omega-3 Fatty Acids (fish oil) 1,000 mg, Take 1,000 mg by mouth daily, Disp: , Rfl:     Stelara 90 MG/ML subcutaneous injection, INJECT 1 SYRINGE UNDER THE SKIN EVERY 8 WEEKS, Disp: , Rfl:     traMADol (ULTRAM) 50 mg tablet, take 1 tablet by mouth every 6 hours if needed for moderate pain, Disp: 120 tablet, Rfl: 5    omeprazole (PriLOSEC) 20 mg delayed release capsule, take 1 capsule by mouth once daily (Patient not taking: Reported on 10/31/2024), Disp: 90 capsule, Rfl: 11  No Known Allergies    Labs:     Chemistry        Component Value Date/Time    K 4.2 08/06/2024 0725    K 5.4 (H) 06/18/2021 0816     08/06/2024 0725     06/18/2021 0816    CO2 27 08/06/2024 0725    CO2 23 06/18/2021 0816    BUN 15 08/06/2024 0725    BUN 12 06/18/2021 0816    CREATININE 0.90 08/06/2024 0725    CREATININE 0.70 06/18/2021 0816        Component Value Date/Time    CALCIUM 9.2 08/06/2024 0725    CALCIUM 8.9 06/18/2021 0816    ALKPHOS 87 08/06/2024 0725    ALKPHOS 127 (H) 06/18/2021 0816    AST 19 08/06/2024 0725    AST 25 06/18/2021 0816    ALT 15 08/06/2024 0725    ALT 35 06/18/2021 0816            No results found for: \"CHOL\"  Lab Results   Component Value Date    HDL 49 (L) 08/06/2024    HDL 55 11/23/2022    HDL 58 06/27/2022     Lab Results   Component Value Date    LDLCALC 45 08/06/2024    LDLCALC 35 11/23/2022    LDLCALC 57 06/27/2022     Lab " "Results   Component Value Date    TRIG 113 08/06/2024    TRIG 95 11/23/2022    TRIG 90 06/27/2022     No results found for: \"CHOLHDL\"    Imaging: No results found.    ECG:   Sinus rhythm poor R-wave progression      Review of Systems   Constitutional: Negative.   HENT: Negative.     Eyes: Negative.    Cardiovascular:  Negative for chest pain, dyspnea on exertion and palpitations.   Respiratory: Negative.     Endocrine: Negative.    Hematologic/Lymphatic: Negative.    Skin: Negative.    Musculoskeletal: Negative.    Gastrointestinal: Negative.    Genitourinary: Negative.    Neurological: Negative.    Psychiatric/Behavioral: Negative.     All other systems reviewed and are negative.      Vitals:    10/31/24 1226   BP: 114/76   Pulse: 75   SpO2: 98%     Vitals:    10/31/24 1226   Weight: 76.2 kg (168 lb)     Height: 5' 1\" (154.9 cm)   Body mass index is 31.74 kg/m².  Physical Exam:  Vital signs reviewed  General:  Alert and cooperative, appears stated age, no acute distress  HEENT:  PERRLA, EOMI, no scleral icterus, no conjunctival pallor  Neck:  No lymphadenopathy, no thyromegaly, no carotid bruits, no elevated JVP  Heart:  Regular rate and rhythm, normal S1/S2, no S3/S4, no murmur, rubs or gallops.  PMI nondisplaced  Lungs:  Clear to auscultation bilaterally, no wheezes rales or rhonchi  Abdomen:  Soft, non-tender, positive bowel sounds, no rebound or guarding,   no organomegaly   Extremities:  Normal range of motion.  No clubbing, cyanosis or edema   Vascular:  2+ pedal pulses  Skin:  No rashes or lesions on exposed skin  Neurologic:  Cranial nerves II-XII grossly intact without focal deficits  Psych:  Normal mood and affect      "

## 2024-11-01 DIAGNOSIS — K50.018 CROHN'S DISEASE OF SMALL INTESTINE WITH OTHER COMPLICATION (HCC): Primary | ICD-10-CM

## 2024-11-01 RX ORDER — USTEKINUMAB 90 MG/ML
90 INJECTION, SOLUTION SUBCUTANEOUS
Qty: 1 ML | Refills: 1 | Status: SHIPPED | OUTPATIENT
Start: 2024-11-01

## 2024-11-01 NOTE — TELEPHONE ENCOUNTER
Reason for call:   [x] Refill   [] Prior Auth  [x] Other: Pt gets through pt assistance program      Office:   [] PCP/Provider -   [x] Specialty/Provider - [facility]     Medication: Stelara 90 MG/ML subcutaneous injection  INJECT 1 SYRINGE UNDER THE SKIN EVERY 8 WEEKS       Pharmacy: [facility]     Does the patient have enough for 3 days?   [] Yes   [x] No - Send as HP to POD

## 2024-11-04 DIAGNOSIS — J30.2 SEASONAL ALLERGIES: ICD-10-CM

## 2024-11-04 DIAGNOSIS — K50.90 CROHN'S DISEASE WITHOUT COMPLICATION, UNSPECIFIED GASTROINTESTINAL TRACT LOCATION (HCC): ICD-10-CM

## 2024-11-05 RX ORDER — FEXOFENADINE HCL 180 MG/1
180 TABLET ORAL DAILY
Qty: 180 TABLET | Refills: 3 | Status: SHIPPED | OUTPATIENT
Start: 2024-11-05 | End: 2024-11-13

## 2024-11-05 RX ORDER — TRAMADOL HYDROCHLORIDE 50 MG/1
50 TABLET ORAL EVERY 6 HOURS PRN
Qty: 120 TABLET | Refills: 5 | Status: SHIPPED | OUTPATIENT
Start: 2024-11-05 | End: 2024-11-14 | Stop reason: SDUPTHER

## 2024-11-13 DIAGNOSIS — J30.2 SEASONAL ALLERGIES: ICD-10-CM

## 2024-11-13 RX ORDER — FEXOFENADINE HCL 180 MG/1
180 TABLET ORAL DAILY
Qty: 180 TABLET | Refills: 3 | Status: SHIPPED | OUTPATIENT
Start: 2024-11-13 | End: 2024-11-14 | Stop reason: SDUPTHER

## 2024-11-14 DIAGNOSIS — J30.2 SEASONAL ALLERGIES: ICD-10-CM

## 2024-11-14 DIAGNOSIS — I10 ESSENTIAL HYPERTENSION: ICD-10-CM

## 2024-11-14 DIAGNOSIS — F41.9 ANXIETY: ICD-10-CM

## 2024-11-14 DIAGNOSIS — K50.90 CROHN'S DISEASE WITHOUT COMPLICATION, UNSPECIFIED GASTROINTESTINAL TRACT LOCATION (HCC): ICD-10-CM

## 2024-11-14 RX ORDER — ALPRAZOLAM 1 MG/1
1 TABLET ORAL 3 TIMES DAILY PRN
Qty: 270 TABLET | Refills: 0 | Status: SHIPPED | OUTPATIENT
Start: 2024-11-14 | End: 2024-11-15 | Stop reason: SDUPTHER

## 2024-11-14 RX ORDER — AMLODIPINE BESYLATE 5 MG/1
5 TABLET ORAL DAILY
Qty: 90 TABLET | Refills: 1 | Status: SHIPPED | OUTPATIENT
Start: 2024-11-14 | End: 2024-11-15 | Stop reason: SDUPTHER

## 2024-11-14 RX ORDER — FEXOFENADINE HCL 180 MG/1
180 TABLET ORAL DAILY
Qty: 180 TABLET | Refills: 3 | Status: SHIPPED | OUTPATIENT
Start: 2024-11-14 | End: 2024-11-15 | Stop reason: SDUPTHER

## 2024-11-14 RX ORDER — FEXOFENADINE HCL 180 MG/1
180 TABLET ORAL DAILY
Qty: 180 TABLET | Refills: 3 | Status: SHIPPED | OUTPATIENT
Start: 2024-11-14 | End: 2024-11-14 | Stop reason: SDUPTHER

## 2024-11-14 RX ORDER — TRAMADOL HYDROCHLORIDE 50 MG/1
50 TABLET ORAL EVERY 6 HOURS PRN
Qty: 120 TABLET | Refills: 5 | Status: SHIPPED | OUTPATIENT
Start: 2024-11-14 | End: 2024-11-15 | Stop reason: SDUPTHER

## 2024-11-15 DIAGNOSIS — I10 ESSENTIAL HYPERTENSION: ICD-10-CM

## 2024-11-15 DIAGNOSIS — J30.2 SEASONAL ALLERGIES: ICD-10-CM

## 2024-11-15 DIAGNOSIS — F41.9 ANXIETY: ICD-10-CM

## 2024-11-15 DIAGNOSIS — K50.90 CROHN'S DISEASE WITHOUT COMPLICATION, UNSPECIFIED GASTROINTESTINAL TRACT LOCATION (HCC): ICD-10-CM

## 2024-11-15 RX ORDER — TRAMADOL HYDROCHLORIDE 50 MG/1
50 TABLET ORAL EVERY 6 HOURS PRN
Qty: 120 TABLET | Refills: 5 | Status: CANCELLED | OUTPATIENT
Start: 2024-11-15

## 2024-11-15 RX ORDER — AMLODIPINE BESYLATE 5 MG/1
5 TABLET ORAL DAILY
Qty: 90 TABLET | Refills: 1 | Status: SHIPPED | OUTPATIENT
Start: 2024-11-15

## 2024-11-15 RX ORDER — AMLODIPINE BESYLATE 5 MG/1
5 TABLET ORAL DAILY
Qty: 90 TABLET | Refills: 1 | Status: CANCELLED | OUTPATIENT
Start: 2024-11-15

## 2024-11-15 RX ORDER — FEXOFENADINE HCL 180 MG/1
180 TABLET ORAL DAILY
Qty: 180 TABLET | Refills: 3 | Status: SHIPPED | OUTPATIENT
Start: 2024-11-15

## 2024-11-15 RX ORDER — TRAMADOL HYDROCHLORIDE 50 MG/1
50 TABLET ORAL EVERY 6 HOURS PRN
Qty: 120 TABLET | Refills: 5 | Status: SHIPPED | OUTPATIENT
Start: 2024-11-15

## 2024-11-15 RX ORDER — ALPRAZOLAM 1 MG/1
1 TABLET ORAL 3 TIMES DAILY PRN
Qty: 270 TABLET | Refills: 0 | Status: SHIPPED | OUTPATIENT
Start: 2024-11-15

## 2024-11-15 RX ORDER — FEXOFENADINE HCL 180 MG/1
180 TABLET ORAL DAILY
Qty: 180 TABLET | Refills: 3 | Status: CANCELLED | OUTPATIENT
Start: 2024-11-15

## 2024-11-15 RX ORDER — ALPRAZOLAM 1 MG/1
1 TABLET ORAL 3 TIMES DAILY PRN
Qty: 270 TABLET | Refills: 0 | Status: CANCELLED | OUTPATIENT
Start: 2024-11-15

## 2024-11-23 DIAGNOSIS — J30.2 SEASONAL ALLERGIES: ICD-10-CM

## 2024-11-25 RX ORDER — FEXOFENADINE HCL 180 MG/1
180 TABLET ORAL DAILY
Qty: 180 TABLET | Refills: 3 | Status: SHIPPED | OUTPATIENT
Start: 2024-11-25

## 2024-11-28 ENCOUNTER — RA CDI HCC (OUTPATIENT)
Dept: OTHER | Facility: HOSPITAL | Age: 59
End: 2024-11-28

## 2024-11-29 NOTE — PROGRESS NOTES
HCC coding opportunities          Chart Reviewed number of suggestions sent to Provider: 1  [phone]       Patients Insurance     Medicare Insurance: [employer]age

## 2024-12-13 ENCOUNTER — VBI (OUTPATIENT)
Dept: ADMINISTRATIVE | Facility: OTHER | Age: 59
End: 2024-12-13

## 2024-12-13 NOTE — TELEPHONE ENCOUNTER
12/13/24 8:44 AM     Chart reviewed for Mammogram ; nothing is submitted to the patient's insurance at this time.     Filiberto Bonilla MA   [facility]

## 2024-12-19 DIAGNOSIS — T78.40XA ALLERGY, INITIAL ENCOUNTER: ICD-10-CM

## 2024-12-19 RX ORDER — MONTELUKAST SODIUM 10 MG/1
10 TABLET ORAL
Qty: 90 TABLET | Refills: 3 | Status: SHIPPED | OUTPATIENT
Start: 2024-12-19

## 2025-01-10 ENCOUNTER — DOCUMENTATION (OUTPATIENT)
Dept: FAMILY MEDICINE CLINIC | Facility: CLINIC | Age: 60
End: 2025-01-10

## 2025-01-10 ENCOUNTER — OFFICE VISIT (OUTPATIENT)
Dept: FAMILY MEDICINE CLINIC | Facility: CLINIC | Age: 60
End: 2025-01-10
Payer: COMMERCIAL

## 2025-01-10 VITALS
HEART RATE: 78 BPM | WEIGHT: 164 LBS | OXYGEN SATURATION: 97 % | HEIGHT: 61 IN | SYSTOLIC BLOOD PRESSURE: 120 MMHG | BODY MASS INDEX: 30.96 KG/M2 | DIASTOLIC BLOOD PRESSURE: 74 MMHG

## 2025-01-10 DIAGNOSIS — J42 CHRONIC BRONCHITIS, UNSPECIFIED CHRONIC BRONCHITIS TYPE (HCC): ICD-10-CM

## 2025-01-10 DIAGNOSIS — Z79.899 IMMUNODEFICIENCY DUE TO LONG TERM DRUG THERAPY  (HCC): ICD-10-CM

## 2025-01-10 DIAGNOSIS — D84.821 IMMUNODEFICIENCY DUE TO LONG TERM DRUG THERAPY  (HCC): ICD-10-CM

## 2025-01-10 DIAGNOSIS — K50.90 CROHN'S DISEASE WITHOUT COMPLICATION, UNSPECIFIED GASTROINTESTINAL TRACT LOCATION (HCC): ICD-10-CM

## 2025-01-10 DIAGNOSIS — E78.2 MIXED HYPERLIPIDEMIA: ICD-10-CM

## 2025-01-10 DIAGNOSIS — K50.018 CROHN'S DISEASE OF SMALL INTESTINE WITH OTHER COMPLICATION (HCC): Primary | ICD-10-CM

## 2025-01-10 DIAGNOSIS — I25.119 CORONARY ARTERY DISEASE INVOLVING NATIVE CORONARY ARTERY OF NATIVE HEART WITH ANGINA PECTORIS (HCC): ICD-10-CM

## 2025-01-10 DIAGNOSIS — I25.10 ASCVD (ARTERIOSCLEROTIC CARDIOVASCULAR DISEASE): ICD-10-CM

## 2025-01-10 DIAGNOSIS — F11.20 OPIOID DEPENDENCE, UNCOMPLICATED (HCC): ICD-10-CM

## 2025-01-10 DIAGNOSIS — F41.9 ANXIETY: ICD-10-CM

## 2025-01-10 DIAGNOSIS — F11.20 CONTINUOUS OPIOID DEPENDENCE (HCC): ICD-10-CM

## 2025-01-10 PROCEDURE — 99214 OFFICE O/P EST MOD 30 MIN: CPT | Performed by: FAMILY MEDICINE

## 2025-01-10 RX ORDER — TRAMADOL HYDROCHLORIDE 50 MG/1
100 TABLET ORAL EVERY 6 HOURS PRN
Qty: 240 TABLET | Refills: 5 | Status: SHIPPED | OUTPATIENT
Start: 2025-01-10

## 2025-01-10 RX ORDER — ALPRAZOLAM 1 MG/1
2 TABLET ORAL 3 TIMES DAILY PRN
Qty: 270 TABLET | Refills: 0 | Status: SHIPPED | OUTPATIENT
Start: 2025-01-10

## 2025-01-13 DIAGNOSIS — K50.018 CROHN'S DISEASE OF SMALL INTESTINE WITH OTHER COMPLICATION (HCC): ICD-10-CM

## 2025-01-13 NOTE — TELEPHONE ENCOUNTER
Reason for call:   [x] Refill   [] Prior Auth  [] Other:     Office:   [] PCP/Provider -   [x] Specialty/Provider - Gastro    Medication: Stelara 90 MG/ML subcutaneous injection     Dose/Frequency: Inject 1 mL (90 mg total) under the skin every 8 weeks     Quantity: 1 mL    Pharmacy: [facility] [place] - [facility]     Does the patient have enough for 3 days?   [] Yes   [x] No - Send as HP to POD

## 2025-01-14 RX ORDER — USTEKINUMAB 90 MG/ML
90 INJECTION, SOLUTION SUBCUTANEOUS
Qty: 1 ML | Refills: 2 | Status: SHIPPED | OUTPATIENT
Start: 2025-01-14

## 2025-01-15 ENCOUNTER — TELEPHONE (OUTPATIENT)
Dept: FAMILY MEDICINE CLINIC | Facility: CLINIC | Age: 60
End: 2025-01-15

## 2025-01-15 NOTE — PROGRESS NOTES
Name: Chelsi Porter      : 1965      MRN: [id number]  Encounter Provider: Uziel Blackmon MD  Encounter Date: 1/10/2025   Encounter department: [facility]  :  Assessment & Plan  Anxiety    Orders:    ALPRAZolam (XANAX) 1 mg tablet; Take 2 tablets (2 mg total) by mouth 3 (three) times a day as needed for anxiety    TSH, 3rd generation with Free T4 reflex; Future    Crohn's disease of small intestine with other complication (HCC)    Orders:    Sedimentation rate, automated; Future    C-reactive protein; Future    ASCVD (arteriosclerotic cardiovascular disease)    Orders:    Lipid panel; Future    Coronary artery disease involving native coronary artery of native heart with angina pectoris (HCC)    Orders:    Lipid panel; Future    Mixed hyperlipidemia    Orders:    Lipid panel; Future    Comprehensive metabolic panel; Future    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  She has failed PT and various OTC and prescription NSAIDs.  She previously had good control of the pain with tramadol.  She requests and increase in dosage as she has increased pain.    She has long-standing anxiety.  She has been on Xanax for >15 years.  She has an unstable social situation and she requests an increase in dosage of this medication.    She is aware of the dangers in taking benzodiazepines and opioids together.  She has had no previous adverse events.  She and I feel that in her case, the benefits outweigh the  "risks.      Review of Systems   All other systems reviewed and are negative.      Objective   /74   Pulse 78   Ht 5' 1\" (1.549 m)   Wt 74.4 kg (164 lb)   SpO2 97%   BMI 30.99 kg/m²      Physical Exam  Vitals and nursing note reviewed.   Constitutional:       Appearance: Normal appearance. She is obese.   Cardiovascular:      Rate and Rhythm: Normal rate and regular rhythm.      Pulses: Normal pulses.      Heart sounds: Normal heart sounds.   Pulmonary:      Effort: Pulmonary effort is normal.      Breath sounds: Normal breath sounds.   Abdominal:      General: Abdomen is flat. Bowel sounds are normal.      Palpations: Abdomen is soft.   Musculoskeletal:         General: Normal range of motion.      Cervical back: Normal range of motion and neck supple.   Skin:     General: Skin is warm and dry.      Capillary Refill: Capillary refill takes less than 2 seconds.   Neurological:      General: No focal deficit present.      Mental Status: She is alert and oriented to person, place, and time. Mental status is at baseline.   Psychiatric:         Mood and Affect: Mood normal.         Behavior: Behavior normal.         Thought Content: Thought content normal.         Judgment: Judgment normal.         "

## 2025-01-15 NOTE — ASSESSMENT & PLAN NOTE
Orders:    traMADol (ULTRAM) 50 mg tablet; Take 2 tablets (100 mg total) by mouth every 6 (six) hours as needed for moderate pain

## 2025-01-15 NOTE — ASSESSMENT & PLAN NOTE
Orders:    ALPRAZolam (XANAX) 1 mg tablet; Take 2 tablets (2 mg total) by mouth 3 (three) times a day as needed for anxiety    TSH, 3rd generation with Free T4 reflex; Future

## 2025-01-17 ENCOUNTER — TELEPHONE (OUTPATIENT)
Dept: FAMILY MEDICINE CLINIC | Facility: CLINIC | Age: 60
End: 2025-01-17

## 2025-01-17 NOTE — TELEPHONE ENCOUNTER
Pts insurance is requesting more information for prior auth of medicatins alprazolam  and tramadol.  Stated documentation must include reasoning as to why pt needs medication quantity and reasoning for the dosage/quantity.  Requested documentation be faxed to [phone].  All office notes were sent, requested additional documentation,  ty

## 2025-01-23 ENCOUNTER — TELEPHONE (OUTPATIENT)
Age: 60
End: 2025-01-23

## 2025-01-23 NOTE — TELEPHONE ENCOUNTER
Angela calling on behalf of patient who is currently enrolled in an assistance program and wanted to confirm pt. Was still taking Stelara injections, advised script was sent out on 1/14/25 and was sent to [facility]

## 2025-01-24 ENCOUNTER — TELEPHONE (OUTPATIENT)
Dept: FAMILY MEDICINE CLINIC | Facility: CLINIC | Age: 60
End: 2025-01-24

## 2025-01-27 ENCOUNTER — TELEPHONE (OUTPATIENT)
Dept: FAMILY MEDICINE CLINIC | Facility: CLINIC | Age: 60
End: 2025-01-27

## 2025-01-27 NOTE — TELEPHONE ENCOUNTER
"Patient came to the office with a letter about her medication. All the information was sent 01/24. Patient states \" Apparently they did not receive the fax. Please fax again. Fax number [phone] and [phone]  "

## 2025-02-04 ENCOUNTER — OFFICE VISIT (OUTPATIENT)
Dept: CARDIOLOGY CLINIC | Facility: HOSPITAL | Age: 60
End: 2025-02-04
Payer: COMMERCIAL

## 2025-02-04 VITALS
HEIGHT: 61 IN | HEART RATE: 71 BPM | WEIGHT: 165 LBS | OXYGEN SATURATION: 98 % | DIASTOLIC BLOOD PRESSURE: 78 MMHG | BODY MASS INDEX: 31.15 KG/M2 | SYSTOLIC BLOOD PRESSURE: 116 MMHG

## 2025-02-04 DIAGNOSIS — Z95.5 S/P BARE METAL CORONARY ARTERY STENT: ICD-10-CM

## 2025-02-04 DIAGNOSIS — F11.20 OPIOID DEPENDENCE, UNCOMPLICATED (HCC): ICD-10-CM

## 2025-02-04 DIAGNOSIS — I25.119 CORONARY ARTERY DISEASE INVOLVING NATIVE CORONARY ARTERY OF NATIVE HEART WITH ANGINA PECTORIS (HCC): Primary | ICD-10-CM

## 2025-02-04 DIAGNOSIS — E78.2 MIXED HYPERLIPIDEMIA: ICD-10-CM

## 2025-02-04 DIAGNOSIS — R00.2 PALPITATIONS: ICD-10-CM

## 2025-02-04 PROCEDURE — 99214 OFFICE O/P EST MOD 30 MIN: CPT | Performed by: INTERNAL MEDICINE

## 2025-02-04 RX ORDER — THIAMINE HCL 50 MG
50 TABLET ORAL DAILY
COMMUNITY

## 2025-02-05 NOTE — PROGRESS NOTES
Chelsi Porter  1965  [id number]  CARDIOVASC PHYSICIAN  [address] [address]  [phone]  [phone]    1. Coronary artery disease involving native coronary artery of native heart with angina pectoris (HCC)  Echo complete w/ contrast if indicated    Zio Monitor      2. Opioid dependence, uncomplicated (HCC)        3. S/p bare metal coronary artery stent        4. Palpitations  Zio Monitor      5. Mixed hyperlipidemia            Discussion/Summary:   1. Coronary artery disease prior PCI with bare metal stent to 1st diagonal in 2016, last catheterization 2019 showed no obstructive lesions mild to moderate residual disease   2. Preserved LV systolic function   3.  Tobacco abuse   4. Hyperlipidemia   5. Palpitations    Recommendations: She has had some recurrent palpitations and fluttering.  Along with some mild shortness of breath.  I recommended a 1 week Zio patch and echocardiogram to evaluate for any underlying structural or electrical causes.  Symptoms do not sound anginal in nature.  Blood pressure has been controlled lipids have been stable advised on quitting smoking.      Interval History:   59-year-old female who had non-STEMI in 2016 receiving a bare metal stent to the 1st diagonal, subsequent catheterization in 2019 showed no obstructive lesions requiring PCI, hyperlipidemia, tobacco abuse presents to establish care with me in the office.  She has been under some stress recently in throughout COVID during some episode she has had some chest pain with radiation up towards the neck associated with palpitations.  Denies any significant dyspnea.  There has been no lower extremity edema, PND, orthopnea.  Overall she has been fairly active around the house she has been taking medications as prescribed.  She has lost a significant amount of weight and is also trying to quit smoking.  She is down to only 1 or 2  Cigarettes a day    She has been under some stress recently increased episodes  of palpitations and fluttering.  No syncope.  Occasional shortness of breath denies any anginal sounding chest pain or discomfort.  No claudication.  No syncope.              Medical Problems       Problem List       Anxiety    ASCVD (arteriosclerotic cardiovascular disease)    Overview Signed 1/15/2019  7:53 AM by Uziel Blackmon MD     Atypical chest pain         HLD (hyperlipidemia)    S/p bare metal coronary artery stent    Overview Signed 4/27/2018  8:34 AM by Uziel Blackmon MD     Overview:   2.0 x 15mm bare metal stent ([employer]) 1/25/2016 for severe diag lesion and NSTEMI w/preceeding rest angina. [facility].          Status post non-ST elevation myocardial infarction (NSTEMI)    Fatty food intolerance    Overview Deleted 6/21/2019  8:32 AM by Uziel Blackmon MD            Left foot pain    Palpitations    Abnormal CT of the abdomen    Coronary artery disease involving native coronary artery of native heart with angina pectoris (HCC)    Overview Signed 4/30/2019 11:00 AM by PITO Stubbs     Mid Circumflex stenosis 30%         Low back pain    Sciatica    Gastroesophageal reflux disease    Family history of colonic polyps    Chronic bronchitis, unspecified chronic bronchitis type (HCC)    Dislocation of tarsometatarsal joint of right foot                  Past Medical History:   Diagnosis Date    Angina pectoris (HCC)     Anxiety     BMI 34.0-34.9,adult 03/29/2019    Crohn disease ([facility])     GERD (gastroesophageal reflux disease)     Myocardial infarction ([facility])     1/24/16    NSTEMI (non-ST elevated myocardial infarction) ([facility]) 01/24/2016    Overview:  Max trop I 1.77([facility]) w/nml LVEF and wall motion.     Shortness of breath      Social History     Socioeconomic History    Marital status:      Spouse name: Not on file    Number of children: Not on file    Years of education: Not on file    Highest education level: Not on file   Occupational History    Not on file   Tobacco Use    Smoking status:  Former     Types: Cigarettes    Smokeless tobacco: Never    Tobacco comments:     Quit 2021   Vaping Use    Vaping status: Every Day    Substances: Nicotine, Flavoring   Substance and Sexual Activity    Alcohol use: No    Drug use: No    Sexual activity: Not Currently     Partners: Male   Other Topics Concern    Not on file   Social History Narrative    Not on file     Social Drivers of Health     Financial Resource Strain: High Risk (1/3/2024)    Overall Financial Resource Strain (CARDIA)     Difficulty of Paying Living Expenses: Hard   Food Insecurity: Not on file   Transportation Needs: No Transportation Needs (1/3/2024)    PRAPARE - Transportation     Lack of Transportation (Medical): No     Lack of Transportation (Non-Medical): No   Physical Activity: Not on file   Stress: Not on file   Social Connections: Unknown (6/18/2024)    Received from [facility]    Social [place]     How often do you feel lonely or isolated from those around you? (Adult - for ages 18 years and over): Not on file   Intimate Partner Violence: Not At Risk (2/18/2024)    [facility]    Domestic Violence Screen     Physical Abuse Risk: Not At Risk     Verbal Abuse Risk: Not At Risk   Housing Stability: Not on file      Family History   Problem Relation Age of Onset    Atrial fibrillation Mother     Heart failure Mother     Diabetes type II Mother     Hypertension Mother     Lung disease Mother         Pleural Effusion    Hypertension Father      Past Surgical History:   Procedure Laterality Date    CARDIAC SURGERY      CORONARY ANGIOPLASTY WITH STENT PLACEMENT      TOE SURGERY      TUBAL LIGATION      TUBAL LIGATION         Current Outpatient Medications:     ALPRAZolam (XANAX) 1 mg tablet, Take 2 tablets (2 mg total) by mouth 3 (three) times a day as needed for anxiety, Disp: 270 tablet, Rfl: 0    amLODIPine (NORVASC) 5 mg tablet, Take 1 tablet (5 mg total) by mouth daily, Disp: 90 tablet,  Rfl: 1    APPLE CIDER VINEGAR PO, Take by mouth, Disp: , Rfl:     aspirin (Aspirin Adult Low Dose) 81 mg EC tablet, , Disp: , Rfl:     atorvastatin (LIPITOR) 80 mg tablet, Take 1 tablet (80 mg total) by mouth daily, Disp: 90 tablet, Rfl: 3    Cranberry 50 MG CHEW, Chew, Disp: , Rfl:     fexofenadine (ALLEGRA) 180 MG tablet, take 1 tablet by mouth daily, Disp: 180 tablet, Rfl: 3    Garlic 100 MG TABS, Take by mouth, Disp: , Rfl:     metoprolol tartrate (LOPRESSOR) 50 mg tablet, Take 1 tablet (50 mg total) by mouth 2 (two) times a day, Disp: 180 tablet, Rfl: 3    montelukast (SINGULAIR) 10 mg tablet, Take 1 tablet (10 mg total) by mouth daily at bedtime, Disp: 90 tablet, Rfl: 3    nitroglycerin (NITROSTAT) 0.4 mg SL tablet, place 1 tablet under the tongue if needed every 5 minutes for chest pain for 3 doses IF NO RELIEF AFTER THIRD DOSE CALL 911., Disp: 100 tablet, Rfl: 5    Omega-3 Fatty Acids (fish oil) 1,000 mg, Take 1,000 mg by mouth daily, Disp: , Rfl:     thiamine (VITAMIN B1) 50 mg tablet, Take 50 mg by mouth daily, Disp: , Rfl:     traMADol (ULTRAM) 50 mg tablet, Take 2 tablets (100 mg total) by mouth every 6 (six) hours as needed for moderate pain, Disp: 240 tablet, Rfl: 5    Stelara 90 MG/ML subcutaneous injection, Inject 1 mL (90 mg total) under the skin every 8 weeks (Patient not taking: Reported on 2/4/2025), Disp: 1 mL, Rfl: 2  No Known Allergies    Labs:     Chemistry        Component Value Date/Time    K 4.2 08/06/2024 0725    K 5.4 (H) 06/18/2021 0816     08/06/2024 0725     06/18/2021 0816    CO2 27 08/06/2024 0725    CO2 23 06/18/2021 0816    BUN 15 08/06/2024 0725    BUN 12 06/18/2021 0816    CREATININE 0.90 08/06/2024 0725    CREATININE 0.70 06/18/2021 0816        Component Value Date/Time    CALCIUM 9.2 08/06/2024 0725    CALCIUM 8.9 06/18/2021 0816    ALKPHOS 87 08/06/2024 0725    ALKPHOS 127 (H) 06/18/2021 0816    AST 19 08/06/2024 0725    AST 25 06/18/2021 0816    ALT 15 08/06/2024  "0725    ALT 35 06/18/2021 0816            No results found for: \"CHOL\"  Lab Results   Component Value Date    HDL 49 (L) 08/06/2024    HDL 55 11/23/2022    HDL 58 06/27/2022     Lab Results   Component Value Date    LDLCALC 45 08/06/2024    LDLCALC 35 11/23/2022    LDLCALC 57 06/27/2022     Lab Results   Component Value Date    TRIG 113 08/06/2024    TRIG 95 11/23/2022    TRIG 90 06/27/2022     No results found for: \"CHOLHDL\"    Imaging: No results found.    ECG:   Sinus rhythm poor R-wave progression      Review of Systems   Constitutional: Negative.   HENT: Negative.     Eyes: Negative.    Cardiovascular:  Negative for chest pain, dyspnea on exertion and palpitations.   Respiratory: Negative.     Endocrine: Negative.    Hematologic/Lymphatic: Negative.    Skin: Negative.    Musculoskeletal: Negative.    Gastrointestinal: Negative.    Genitourinary: Negative.    Neurological: Negative.    Psychiatric/Behavioral: Negative.     All other systems reviewed and are negative.      Vitals:    02/04/25 1507   BP: 116/78   Pulse: 71   SpO2: 98%     Vitals:    02/04/25 1507   Weight: 74.8 kg (165 lb)     Height: 5' 1\" (154.9 cm)   Body mass index is 31.18 kg/m².  Physical Exam:  Vital signs reviewed  General:  Alert and cooperative, appears stated age, no acute distress  HEENT:  PERRLA, EOMI, no scleral icterus, no conjunctival pallor  Neck:  No lymphadenopathy, no thyromegaly, no carotid bruits, no elevated JVP  Heart:  Regular rate and rhythm, normal S1/S2, no S3/S4, no murmur, rubs or gallops.  PMI nondisplaced  Lungs:  Clear to auscultation bilaterally, no wheezes rales or rhonchi  Abdomen:  Soft, non-tender, positive bowel sounds, no rebound or guarding,   no organomegaly   Extremities:  Normal range of motion.  No clubbing, cyanosis or edema   Vascular:  2+ pedal pulses  Skin:  No rashes or lesions on exposed skin  Neurologic:  Cranial nerves II-XII grossly intact without focal deficits  Psych:  Normal mood and " affect

## 2025-02-10 DIAGNOSIS — F41.9 ANXIETY: ICD-10-CM

## 2025-02-10 RX ORDER — ALPRAZOLAM 1 MG/1
2 TABLET ORAL 3 TIMES DAILY PRN
Qty: 270 TABLET | Refills: 0 | Status: SHIPPED | OUTPATIENT
Start: 2025-02-10

## 2025-02-12 ENCOUNTER — TELEPHONE (OUTPATIENT)
Age: 60
End: 2025-02-12

## 2025-02-12 NOTE — TELEPHONE ENCOUNTER
Pt needs to reapply for [facility] patient assistance program.     Please follow up on status

## 2025-02-13 ENCOUNTER — HOSPITAL ENCOUNTER (OUTPATIENT)
Dept: NON INVASIVE DIAGNOSTICS | Facility: HOSPITAL | Age: 60
Discharge: HOME | End: 2025-02-13
Attending: INTERNAL MEDICINE
Payer: COMMERCIAL

## 2025-02-13 VITALS
SYSTOLIC BLOOD PRESSURE: 116 MMHG | WEIGHT: 165 LBS | HEART RATE: 80 BPM | HEIGHT: 61 IN | DIASTOLIC BLOOD PRESSURE: 78 MMHG | BODY MASS INDEX: 31.15 KG/M2

## 2025-02-13 DIAGNOSIS — I25.119 CORONARY ARTERY DISEASE INVOLVING NATIVE CORONARY ARTERY OF NATIVE HEART WITH ANGINA PECTORIS (HCC): ICD-10-CM

## 2025-02-13 LAB
AORTIC ROOT: 2.7 CM
ASCENDING AORTA: 2.4 CM
BSA FOR ECHO PROCEDURE: 1.74 M2
E WAVE DECELERATION TIME: 210 MS
E/A RATIO: 1.04
FRACTIONAL SHORTENING: 36 (ref 28–44)
INTERVENTRICULAR SEPTUM IN DIASTOLE (PARASTERNAL SHORT AXIS VIEW): 1 CM
INTERVENTRICULAR SEPTUM: 1 CM (ref 0.6–1.1)
LAAS-AP2: 10.1 CM2
LAAS-AP4: 12.5 CM2
LEFT ATRIUM SIZE: 3.4 CM
LEFT ATRIUM VOLUME (MOD BIPLANE): 26 ML
LEFT ATRIUM VOLUME INDEX (MOD BIPLANE): 14.9 ML/M2
LEFT INTERNAL DIMENSION IN SYSTOLE: 2.1 CM (ref 2.1–4)
LEFT VENTRICULAR INTERNAL DIMENSION IN DIASTOLE: 3.3 CM (ref 3.5–6)
LEFT VENTRICULAR POSTERIOR WALL IN END DIASTOLE: 1.2 CM
LEFT VENTRICULAR STROKE VOLUME: 30 ML
LV EF US.2D.A4C+ESTIMATED: 65 %
LVSV (TEICH): 30 ML
MV E'TISSUE VEL-SEP: 7 CM/S
MV PEAK A VEL: 0.72 M/S
MV PEAK E VEL: 75 CM/S
MV STENOSIS PRESSURE HALF TIME: 61 MS
MV VALVE AREA P 1/2 METHOD: 3.61
RIGHT ATRIUM AREA SYSTOLE A4C: 10.5 CM2
RIGHT VENTRICLE ID DIMENSION: 2.8 CM
SL CV LEFT ATRIUM LENGTH A2C: 3.9 CM
SL CV LV EF: 60
SL CV PED ECHO LEFT VENTRICLE DIASTOLIC VOLUME (MOD BIPLANE) 2D: 45 ML
SL CV PED ECHO LEFT VENTRICLE SYSTOLIC VOLUME (MOD BIPLANE) 2D: 15 ML
TR MAX PG: 28 MMHG
TR PEAK VELOCITY: 2.6 M/S
TRICUSPID ANNULAR PLANE SYSTOLIC EXCURSION: 2 CM
TRICUSPID VALVE PEAK REGURGITATION VELOCITY: 2.62 M/S

## 2025-02-13 PROCEDURE — 93306 TTE W/DOPPLER COMPLETE: CPT | Performed by: INTERNAL MEDICINE

## 2025-02-13 PROCEDURE — 93306 TTE W/DOPPLER COMPLETE: CPT

## 2025-02-14 ENCOUNTER — TELEPHONE (OUTPATIENT)
Age: 60
End: 2025-02-14

## 2025-02-14 NOTE — TELEPHONE ENCOUNTER
Caller: Chelsi Porter    Doctor: Dr. Agarwal     Reason for call: Patient had an Echo complete done on 2/13/25. She is asking if someone can follow up with her on these results and let her know if the test results are good or if she will need to be seen.     Call back#: [phone]

## 2025-02-19 ENCOUNTER — TELEPHONE (OUTPATIENT)
Age: 60
End: 2025-02-19

## 2025-02-19 NOTE — TELEPHONE ENCOUNTER
Patient's insurance company called needing more information before they can approve the PA.    They need to know if the patient tried and failed 2 of the below preferred medications and if she tried both of them over a 3 month period.    Skyrizi   Cimzia   Infliximab     If she did not try and fail at least 2 of them over a 3 month period for each then they will not approve the PA. They state no notes were faxed over to them     They would like to receive a call with an answer before 12:30 today.    Phone number: [phone]    Case #: [id number]

## 2025-02-19 NOTE — TELEPHONE ENCOUNTER
Patients GI provider:  [facility]    Number to return call: Dian from [facility] [phone]     Reason for call: Dian called from the [facility] [phone] for the preauth for Stelara for the pt already got the auth # [id number] has questions but she was disconnected while waiting for some one to speak with.    Scheduled procedure/appointment date if applicable: Apt 4/25/25

## 2025-02-20 NOTE — TELEPHONE ENCOUNTER
Called pt; lvm for Stelara clarification.  Per office not; pt stated she will d/c Stelara and try natural remedy.

## 2025-02-24 ENCOUNTER — TELEPHONE (OUTPATIENT)
Age: 60
End: 2025-02-24

## 2025-02-24 ENCOUNTER — NURSE TRIAGE (OUTPATIENT)
Age: 60
End: 2025-02-24

## 2025-02-24 DIAGNOSIS — E78.49 OTHER HYPERLIPIDEMIA: ICD-10-CM

## 2025-02-24 DIAGNOSIS — I25.2 STATUS POST NON-ST ELEVATION MYOCARDIAL INFARCTION (NSTEMI): ICD-10-CM

## 2025-02-24 DIAGNOSIS — I25.10 ASCVD (ARTERIOSCLEROTIC CARDIOVASCULAR DISEASE): Primary | ICD-10-CM

## 2025-02-24 DIAGNOSIS — Z95.5 S/P BARE METAL CORONARY ARTERY STENT: ICD-10-CM

## 2025-02-24 NOTE — TELEPHONE ENCOUNTER
"Pt reports dizziness and mild chest pressure at times when laying down. Pt reports it is not enough to take a nitro however she is making herself very anxious. Pt is fearful that she is going to have a heart attack.     Pt has called before (see previous messages) Triage completed. Please complete Extended Holter monitor interpretation. Apt made with Dr. Agarwal for 3/5 to review and discuss these ongoing symptoms.     While on phone had patient take her vital signs. 146/79, HR 95     Advised patient would inform provider of ongoing symptoms. Pt asks that you please  leave a message and she will call back          Answer Assessment - Initial Assessment Questions  1. DESCRIPTION: \"Describe your dizziness.\"      Lightheaded   2. LIGHTHEADED: \"Do you feel lightheaded?\" (e.g., somewhat faint, woozy, weak upon standing)  Sometimes when going from a sitting to a standing position   4. SEVERITY: \"How bad is it?\"  \"Do you feel like you are going to faint?\" \"Can you stand and walk?\"      Does not feel like she will faint   5. ONSET:  \"When did the dizziness begin?\"      Ongoing - comes and goes   6. AGGRAVATING FACTORS: \"Does anything make it worse?\" (e.g., standing, change in head position)      Unsure   7. HEART RATE: \"Can you tell me your heart rate?\" \"How many beats in 15 seconds?\"  (Note: Not all patients can do this.)        95  8. CAUSE: \"What do you think is causing the dizziness?\" (e.g., decreased fluids or food, diarrhea, emotional distress, heat exposure, new medicine, sudden standing, vomiting; unknown)      Drink almost a gallon of fluid daily, denies illness   9. RECURRENT SYMPTOM: \"Have you had dizziness before?\" If Yes, ask: \"When was the last time?\" \"What happened that time?\"      Unsure    Protocols used: Dizziness-Adult-[place]    "

## 2025-02-24 NOTE — TELEPHONE ENCOUNTER
Called pt. Left message stating that she will need to call back to discuss symptoms and appointment.

## 2025-02-24 NOTE — TELEPHONE ENCOUNTER
Regarding: Palpitation/Pressure &Chest Pain  ----- Message from Bhumika GONSALES sent at 2/24/2025  9:56 AM EST -----  Pt was originally calling to get her ZIO results from 2/4/25 and interpretation has not been done which I spoke to morelia about and she was going to send a message to Dr. Agarwal to interpret..    Pt than expressed she is still experiencing Palpitation and light chest pain with dizziness and pressure. She believes is could be do to stress but would like to speak to someone about this.     Her phone goes straight to voicemail due to scam calls so she said to please leave a detail voicemail, if she needs to be scheduled she asked we go ahead and schedule her and leave the appt time and date in the voicemail and she will make it she said she preferred early afternoon and cannot make 4/7/25 due to another appt.

## 2025-02-25 ENCOUNTER — TELEPHONE (OUTPATIENT)
Dept: CARDIOLOGY CLINIC | Facility: HOSPITAL | Age: 60
End: 2025-02-25

## 2025-02-25 NOTE — TELEPHONE ENCOUNTER
Lvm with results below.    DO Keila Valdovinos, LPN16 hours ago (4:01 PM)        let her know the monitor was normal, when she felt symptoms her heart was in a normal rhythm in the 80s

## 2025-02-25 NOTE — TELEPHONE ENCOUNTER
Advised patient of results per Dr. Agarwal's message - pt reports she will still be coming in for her visit with Dr. Agarwal because she is still having symptoms. Advised patient to keep apt.

## 2025-02-26 ENCOUNTER — TELEPHONE (OUTPATIENT)
Dept: CARDIOLOGY CLINIC | Facility: HOSPITAL | Age: 60
End: 2025-02-26

## 2025-02-26 NOTE — TELEPHONE ENCOUNTER
Left message on voice mail for patient that her Zio was normal. Advised that you have ordered a nuc and the Rx for same is in her chart. Provided the number for Central Scheduling for her to schedule. Message was from fannie Boothe at office.  would like Chelsi to have testing done first. Left message 2x for patient

## 2025-03-05 ENCOUNTER — OFFICE VISIT (OUTPATIENT)
Dept: CARDIOLOGY CLINIC | Facility: HOSPITAL | Age: 60
End: 2025-03-05
Payer: COMMERCIAL

## 2025-03-05 VITALS
OXYGEN SATURATION: 97 % | HEART RATE: 86 BPM | SYSTOLIC BLOOD PRESSURE: 114 MMHG | HEIGHT: 61 IN | WEIGHT: 164 LBS | DIASTOLIC BLOOD PRESSURE: 78 MMHG | BODY MASS INDEX: 30.96 KG/M2

## 2025-03-05 DIAGNOSIS — I25.10 ASCVD (ARTERIOSCLEROTIC CARDIOVASCULAR DISEASE): ICD-10-CM

## 2025-03-05 DIAGNOSIS — I25.119 CORONARY ARTERY DISEASE INVOLVING NATIVE CORONARY ARTERY OF NATIVE HEART WITH ANGINA PECTORIS (HCC): Primary | ICD-10-CM

## 2025-03-05 DIAGNOSIS — Z95.5 S/P BARE METAL CORONARY ARTERY STENT: ICD-10-CM

## 2025-03-05 DIAGNOSIS — R00.2 PALPITATIONS: ICD-10-CM

## 2025-03-05 DIAGNOSIS — E78.2 MIXED HYPERLIPIDEMIA: ICD-10-CM

## 2025-03-05 PROCEDURE — 99213 OFFICE O/P EST LOW 20 MIN: CPT | Performed by: INTERNAL MEDICINE

## 2025-03-05 NOTE — PROGRESS NOTES
Chelsi Porter  1965  [id number]  CARDIOVASC PHYSICIAN  [address] [facility] [address]  [phone]  [phone]    1. Coronary artery disease involving native coronary artery of native heart with angina pectoris (HCC)        2. Palpitations        3. Mixed hyperlipidemia        4. S/p bare metal coronary artery stent        5. ASCVD (arteriosclerotic cardiovascular disease)            Discussion/Summary:   1. Coronary artery disease prior PCI with bare metal stent to 1st diagonal in 2016, last catheterization 2019 showed no obstructive lesions mild to moderate residual disease   2. Preserved LV systolic function   3.  Tobacco abuse   4. Hyperlipidemia   5. Palpitations    Recommendations: She is here today for an acute visit.  Her main issues seem to focus around significant depression and anxiety.  Being managed by another physician just had medications increased.  Recent Zio patch was within normal limits no abnormalities.  Echocardiogram was also within normal limits.  She has atypical chest pain a Lexiscan Myoview was already ordered we will await the results.  Follow-up at 12-month appointment.      Interval History:   59-year-old female who had non-STEMI in 2016 receiving a bare metal stent to the 1st diagonal, subsequent catheterization in 2019 showed no obstructive lesions requiring PCI, hyperlipidemia, tobacco abuse presents to establish care with me in the office.  She has been under some stress recently in throughout COVID during some episode she has had some chest pain with radiation up towards the neck associated with palpitations.  Denies any significant dyspnea.  There has been no lower extremity edema, PND, orthopnea.  Overall she has been fairly active around the house she has been taking medications as prescribed.  She has lost a significant amount of weight and is also trying to quit smoking.  She is down to only 1 or 2  Cigarettes a day    I recently saw her for episodes of  palpitations Zio patch and echo were ordered.  No significant findings.  She has had a lot of stress at home and increasing anxiety being managed by another physician.  Functional capacity is unchanged denies any exertional chest pain or discomfort.  She has had some atypical pain on the right side of the chest Lexiscan Myoview was very established.            Medical Problems       Problem List       Anxiety    ASCVD (arteriosclerotic cardiovascular disease)    Overview Signed 1/15/2019  7:53 AM by Uziel Blackmon MD     Atypical chest pain         HLD (hyperlipidemia)    S/p bare metal coronary artery stent    Overview Signed 4/27/2018  8:34 AM by Uziel Blackmon MD     Overview:   2.0 x 15mm bare metal stent ([employer]) 1/25/2016 for severe diag lesion and NSTEMI w/preceeding rest angina. [facility].          Status post non-ST elevation myocardial infarction (NSTEMI)    Fatty food intolerance    Overview Deleted 6/21/2019  8:32 AM by Uziel Blackmon MD            Left foot pain    Palpitations    Abnormal CT of the abdomen    Coronary artery disease involving native coronary artery of native heart with angina pectoris (HCC)    Overview Signed 4/30/2019 11:00 AM by PITO Stubbs     Mid Circumflex stenosis 30%         Low back pain    Sciatica    Gastroesophageal reflux disease    Family history of colonic polyps    Chronic bronchitis, unspecified chronic bronchitis type ([facility])    Dislocation of tarsometatarsal joint of right foot                  Past Medical History:   Diagnosis Date    Angina pectoris (HCC)     Anxiety     BMI 34.0-34.9,adult 03/29/2019    Crohn disease (HCC)     GERD (gastroesophageal reflux disease)     Myocardial infarction ([facility])     1/24/16    NSTEMI (non-ST elevated myocardial infarction) ([facility]) 01/24/2016    Overview:  Max trop I 1.77([facility]) w/nml LVEF and wall motion.     Shortness of breath      Social History     Socioeconomic History    Marital status:      Spouse name: Not  on file    Number of children: Not on file    Years of education: Not on file    Highest education level: Not on file   Occupational History    Not on file   Tobacco Use    Smoking status: Former     Types: Cigarettes    Smokeless tobacco: Never    Tobacco comments:     Quit 2021   Vaping Use    Vaping status: Every Day    Substances: Nicotine, Flavoring   Substance and Sexual Activity    Alcohol use: No    Drug use: No    Sexual activity: Not Currently     Partners: Male   Other Topics Concern    Not on file   Social History Narrative    Not on file     Social Drivers of Health     Financial Resource Strain: High Risk (1/3/2024)    Overall Financial Resource Strain (CARDIA)     Difficulty of Paying Living Expenses: Hard   Food Insecurity: Not on file   Transportation Needs: No Transportation Needs (1/3/2024)    PRAPARE - Transportation     Lack of Transportation (Medical): No     Lack of Transportation (Non-Medical): No   Physical Activity: Not on file   Stress: Not on file   Social Connections: Unknown (6/18/2024)    Received from [employer]    Social Connections     How often do you feel lonely or isolated from those around you? (Adult - for ages 18 years and over): Not on file   Intimate Partner Violence: Not At Risk (2/18/2024)    [facility]    Domestic Violence Screen     Physical Abuse Risk: Not At Risk     Verbal Abuse Risk: Not At Risk   Housing Stability: Not on file      Family History   Problem Relation Age of Onset    Atrial fibrillation Mother     Heart failure Mother     Diabetes type II Mother     Hypertension Mother     Lung disease Mother         Pleural Effusion    Hypertension Father      Past Surgical History:   Procedure Laterality Date    CARDIAC SURGERY      CORONARY ANGIOPLASTY WITH STENT PLACEMENT      TOE SURGERY      TUBAL LIGATION      TUBAL LIGATION         Current Outpatient Medications:     ALPRAZolam (XANAX) 1 mg tablet, Take 2 tablets  (2 mg total) by mouth 3 (three) times a day as needed for anxiety, Disp: 270 tablet, Rfl: 0    amLODIPine (NORVASC) 5 mg tablet, Take 1 tablet (5 mg total) by mouth daily, Disp: 90 tablet, Rfl: 1    APPLE CIDER VINEGAR PO, Take by mouth, Disp: , Rfl:     aspirin (Aspirin Adult Low Dose) 81 mg EC tablet, , Disp: , Rfl:     atorvastatin (LIPITOR) 80 mg tablet, Take 1 tablet (80 mg total) by mouth daily, Disp: 90 tablet, Rfl: 3    Cranberry 50 MG CHEW, Chew, Disp: , Rfl:     fexofenadine (ALLEGRA) 180 MG tablet, take 1 tablet by mouth daily, Disp: 180 tablet, Rfl: 3    Garlic 100 MG TABS, Take by mouth, Disp: , Rfl:     metoprolol tartrate (LOPRESSOR) 50 mg tablet, Take 1 tablet (50 mg total) by mouth 2 (two) times a day, Disp: 180 tablet, Rfl: 3    montelukast (SINGULAIR) 10 mg tablet, Take 1 tablet (10 mg total) by mouth daily at bedtime, Disp: 90 tablet, Rfl: 3    nitroglycerin (NITROSTAT) 0.4 mg SL tablet, place 1 tablet under the tongue if needed every 5 minutes for chest pain for 3 doses IF NO RELIEF AFTER THIRD DOSE CALL 911., Disp: 100 tablet, Rfl: 5    Omega-3 Fatty Acids (fish oil) 1,000 mg, Take 1,000 mg by mouth daily, Disp: , Rfl:     thiamine (VITAMIN B1) 50 mg tablet, Take 50 mg by mouth daily, Disp: , Rfl:     traMADol (ULTRAM) 50 mg tablet, Take 2 tablets (100 mg total) by mouth every 6 (six) hours as needed for moderate pain, Disp: 240 tablet, Rfl: 5    Stelara 90 MG/ML subcutaneous injection, Inject 1 mL (90 mg total) under the skin every 8 weeks (Patient not taking: Reported on 3/5/2025), Disp: 1 mL, Rfl: 2  No Known Allergies    Labs:     Chemistry        Component Value Date/Time    K 4.2 08/06/2024 0725    K 5.4 (H) 06/18/2021 0816     08/06/2024 0725     06/18/2021 0816    CO2 27 08/06/2024 0725    CO2 23 06/18/2021 0816    BUN 15 08/06/2024 0725    BUN 12 06/18/2021 0816    CREATININE 0.90 08/06/2024 0725    CREATININE 0.70 06/18/2021 0816        Component Value Date/Time    CALCIUM  "9.2 08/06/2024 0725    CALCIUM 8.9 06/18/2021 0816    ALKPHOS 87 08/06/2024 0725    ALKPHOS 127 (H) 06/18/2021 0816    AST 19 08/06/2024 0725    AST 25 06/18/2021 0816    ALT 15 08/06/2024 0725    ALT 35 06/18/2021 0816            No results found for: \"CHOL\"  Lab Results   Component Value Date    HDL 49 (L) 08/06/2024    HDL 55 11/23/2022    HDL 58 06/27/2022     Lab Results   Component Value Date    LDLCALC 45 08/06/2024    LDLCALC 35 11/23/2022    LDLCALC 57 06/27/2022     Lab Results   Component Value Date    TRIG 113 08/06/2024    TRIG 95 11/23/2022    TRIG 90 06/27/2022     No results found for: \"CHOLHDL\"    Imaging: No results found.    ECG:   Sinus rhythm poor R-wave progression      Review of Systems   Constitutional: Negative.   HENT: Negative.     Eyes: Negative.    Cardiovascular:  Negative for chest pain, dyspnea on exertion and palpitations.   Respiratory: Negative.     Endocrine: Negative.    Hematologic/Lymphatic: Negative.    Skin: Negative.    Musculoskeletal: Negative.    Gastrointestinal: Negative.    Genitourinary: Negative.    Neurological: Negative.    Psychiatric/Behavioral: Negative.     All other systems reviewed and are negative.      Vitals:    03/05/25 1235   BP: 114/78   Pulse: 86   SpO2: 97%     Vitals:    03/05/25 1235   Weight: 74.4 kg (164 lb)     Height: 5' 1\" (154.9 cm)   Body mass index is 30.99 kg/m².  Physical Exam:  Vital signs reviewed  General:  Alert and cooperative, appears stated age, no acute distress  HEENT:  PERRLA, EOMI, no scleral icterus, no conjunctival pallor  Neck:  No lymphadenopathy, no thyromegaly, no carotid bruits, no elevated JVP  Heart:  Regular rate and rhythm, normal S1/S2, no S3/S4, no murmur, rubs or gallops.  PMI nondisplaced  Lungs:  Clear to auscultation bilaterally, no wheezes rales or rhonchi  Abdomen:  Soft, non-tender, positive bowel sounds, no rebound or guarding,   no organomegaly   Extremities:  Normal range of motion.  No clubbing, cyanosis " or edema   Vascular:  2+ pedal pulses  Skin:  No rashes or lesions on exposed skin  Neurologic:  Cranial nerves II-XII grossly intact without focal deficits  Psych:  Normal mood and affect

## 2025-03-07 ENCOUNTER — TELEPHONE (OUTPATIENT)
Age: 60
End: 2025-03-07

## 2025-03-07 NOTE — TELEPHONE ENCOUNTER
Patient calling with questions about instructions for stress test. Pre-procedure instructions reviewed. Patient verbalized understanding.

## 2025-03-10 DIAGNOSIS — T78.40XA ALLERGY, INITIAL ENCOUNTER: ICD-10-CM

## 2025-03-10 RX ORDER — MONTELUKAST SODIUM 10 MG/1
10 TABLET ORAL
Qty: 90 TABLET | Refills: 3 | Status: SHIPPED | OUTPATIENT
Start: 2025-03-10

## 2025-03-18 ENCOUNTER — VBI (OUTPATIENT)
Dept: ADMINISTRATIVE | Facility: OTHER | Age: 60
End: 2025-03-18

## 2025-03-18 NOTE — TELEPHONE ENCOUNTER
03/18/25 8:20 AM     Chart reviewed for Mammogram was/were not submitted to the patient's insurance.     Anna Austin MA   [facility]

## 2025-03-21 ENCOUNTER — HOSPITAL ENCOUNTER (OUTPATIENT)
Dept: NUCLEAR MEDICINE | Facility: HOSPITAL | Age: 60
Discharge: HOME | End: 2025-03-21
Attending: INTERNAL MEDICINE
Payer: COMMERCIAL

## 2025-03-21 DIAGNOSIS — I25.10 ASCVD (ARTERIOSCLEROTIC CARDIOVASCULAR DISEASE): ICD-10-CM

## 2025-03-21 DIAGNOSIS — Z95.5 S/P BARE METAL CORONARY ARTERY STENT: ICD-10-CM

## 2025-03-21 DIAGNOSIS — I25.2 STATUS POST NON-ST ELEVATION MYOCARDIAL INFARCTION (NSTEMI): ICD-10-CM

## 2025-03-21 DIAGNOSIS — E78.49 OTHER HYPERLIPIDEMIA: ICD-10-CM

## 2025-03-21 LAB
CHEST PAIN STATEMENT: NORMAL
MAX DIASTOLIC BP: 80 MMHG
MAX HR PERCENT: 57 %
MAX HR: 93 BPM
MAX PREDICTED HEART RATE: 163 BPM
PROTOCOL NAME: NORMAL
RATE PRESSURE PRODUCT: NORMAL
REASON FOR TERMINATION: NORMAL
SL CV REST NUCLEAR ISOTOPE DOSE: 10.8 MCI
SL CV STRESS NUCLEAR ISOTOPE DOSE: 32.4 MCI
SPECT HRT GATED+EF W RNC IV: 81 %
STRESS BASELINE HR: 68 BPM
STRESS PEAK HR: 93 BPM
STRESS POST EXERCISE DUR MIN: 3 MIN
STRESS POST EXERCISE DUR SEC: 1 SEC
STRESS POST PEAK BP: 134 MMHG
STRESS POST PEAK HR: 93 BPM
STRESS POST PEAK SYSTOLIC BP: 134 MMHG
STRESS/REST PERFUSION RATIO: 0.78
TARGET HR FORMULA: NORMAL
TEST INDICATION: NORMAL

## 2025-03-21 PROCEDURE — 78452 HT MUSCLE IMAGE SPECT MULT: CPT

## 2025-03-21 PROCEDURE — 93018 CV STRESS TEST I&R ONLY: CPT | Performed by: INTERNAL MEDICINE

## 2025-03-21 PROCEDURE — A9502 TC99M TETROFOSMIN: HCPCS

## 2025-03-21 PROCEDURE — 93017 CV STRESS TEST TRACING ONLY: CPT

## 2025-03-21 PROCEDURE — 78452 HT MUSCLE IMAGE SPECT MULT: CPT | Performed by: INTERNAL MEDICINE

## 2025-03-21 PROCEDURE — 93016 CV STRESS TEST SUPVJ ONLY: CPT | Performed by: INTERNAL MEDICINE

## 2025-03-21 RX ORDER — REGADENOSON 0.08 MG/ML
0.4 INJECTION, SOLUTION INTRAVENOUS ONCE
Status: COMPLETED | OUTPATIENT
Start: 2025-03-21 | End: 2025-03-21

## 2025-03-21 RX ADMIN — REGADENOSON 0.4 MG: 0.08 INJECTION, SOLUTION INTRAVENOUS at 09:56

## 2025-03-24 ENCOUNTER — TELEPHONE (OUTPATIENT)
Age: 60
End: 2025-03-24

## 2025-03-24 DIAGNOSIS — F41.9 ANXIETY: ICD-10-CM

## 2025-03-24 NOTE — TELEPHONE ENCOUNTER
Caller: Patient    Doctor: Dr. Agarwal    Call back #: [phone]    Reason for call: Patient asked if Dr. Agarwal had read her results yet for her NM stress test. I advised patient that Dr. Agarwal will need to interpret results first and then he (or clinical team) will call you with the findings. Patient asked that when he does so, can he please leave a voicemail if she doesn't answer the phone. She does not have access to [employer].

## 2025-03-25 RX ORDER — ALPRAZOLAM 1 MG/1
2 TABLET ORAL 3 TIMES DAILY PRN
Qty: 270 TABLET | Refills: 0 | Status: SHIPPED | OUTPATIENT
Start: 2025-03-25

## 2025-03-27 ENCOUNTER — APPOINTMENT (OUTPATIENT)
Dept: LAB | Facility: CLINIC | Age: 60
End: 2025-03-27
Payer: COMMERCIAL

## 2025-03-27 DIAGNOSIS — F41.9 ANXIETY: ICD-10-CM

## 2025-03-27 DIAGNOSIS — E78.2 MIXED HYPERLIPIDEMIA: ICD-10-CM

## 2025-03-27 DIAGNOSIS — K50.018 CROHN'S DISEASE OF SMALL INTESTINE WITH OTHER COMPLICATION (HCC): ICD-10-CM

## 2025-03-27 DIAGNOSIS — I25.10 ASCVD (ARTERIOSCLEROTIC CARDIOVASCULAR DISEASE): ICD-10-CM

## 2025-03-27 DIAGNOSIS — I25.119 CORONARY ARTERY DISEASE INVOLVING NATIVE CORONARY ARTERY OF NATIVE HEART WITH ANGINA PECTORIS (HCC): ICD-10-CM

## 2025-03-27 LAB
ALBUMIN SERPL BCG-MCNC: 4.5 G/DL (ref 3.5–5)
ALP SERPL-CCNC: 112 U/L (ref 34–104)
ALT SERPL W P-5'-P-CCNC: 23 U/L (ref 7–52)
ANION GAP SERPL CALCULATED.3IONS-SCNC: 10 MMOL/L (ref 4–13)
AST SERPL W P-5'-P-CCNC: 23 U/L (ref 13–39)
BILIRUB SERPL-MCNC: 0.57 MG/DL (ref 0.2–1)
BUN SERPL-MCNC: 12 MG/DL (ref 5–25)
CALCIUM SERPL-MCNC: 9.3 MG/DL (ref 8.4–10.2)
CHLORIDE SERPL-SCNC: 101 MMOL/L (ref 96–108)
CHOLEST SERPL-MCNC: 142 MG/DL (ref ?–200)
CO2 SERPL-SCNC: 27 MMOL/L (ref 21–32)
CREAT SERPL-MCNC: 0.85 MG/DL (ref 0.6–1.3)
CRP SERPL QL: 4.9 MG/L
ERYTHROCYTE [SEDIMENTATION RATE] IN BLOOD: 13 MM/HOUR (ref 0–29)
GFR SERPL CREATININE-BSD FRML MDRD: 76 ML/MIN/1.73SQ M
GLUCOSE P FAST SERPL-MCNC: 90 MG/DL (ref 65–99)
HDLC SERPL-MCNC: 39 MG/DL
LDLC SERPL CALC-MCNC: 59 MG/DL (ref 0–100)
NONHDLC SERPL-MCNC: 103 MG/DL
POTASSIUM SERPL-SCNC: 3.9 MMOL/L (ref 3.5–5.3)
PROT SERPL-MCNC: 7.3 G/DL (ref 6.4–8.4)
SODIUM SERPL-SCNC: 138 MMOL/L (ref 135–147)
TRIGL SERPL-MCNC: 218 MG/DL (ref ?–150)
TSH SERPL DL<=0.05 MIU/L-ACNC: 4.17 UIU/ML (ref 0.45–4.5)

## 2025-03-27 PROCEDURE — 80053 COMPREHEN METABOLIC PANEL: CPT

## 2025-03-27 PROCEDURE — 86140 C-REACTIVE PROTEIN: CPT

## 2025-03-27 PROCEDURE — 84443 ASSAY THYROID STIM HORMONE: CPT

## 2025-03-27 PROCEDURE — 80061 LIPID PANEL: CPT

## 2025-03-27 PROCEDURE — 85652 RBC SED RATE AUTOMATED: CPT

## 2025-03-27 PROCEDURE — 36415 COLL VENOUS BLD VENIPUNCTURE: CPT

## 2025-03-28 ENCOUNTER — RESULTS FOLLOW-UP (OUTPATIENT)
Dept: FAMILY MEDICINE CLINIC | Facility: CLINIC | Age: 60
End: 2025-03-28

## 2025-03-28 ENCOUNTER — TELEPHONE (OUTPATIENT)
Age: 60
End: 2025-03-28

## 2025-04-03 ENCOUNTER — RA CDI HCC (OUTPATIENT)
Dept: OTHER | Facility: HOSPITAL | Age: 60
End: 2025-04-03

## 2025-04-07 ENCOUNTER — OFFICE VISIT (OUTPATIENT)
Dept: FAMILY MEDICINE CLINIC | Facility: CLINIC | Age: 60
End: 2025-04-07
Payer: COMMERCIAL

## 2025-04-07 VITALS
OXYGEN SATURATION: 97 % | SYSTOLIC BLOOD PRESSURE: 114 MMHG | WEIGHT: 159.1 LBS | DIASTOLIC BLOOD PRESSURE: 70 MMHG | HEART RATE: 83 BPM | RESPIRATION RATE: 18 BRPM | HEIGHT: 61 IN | BODY MASS INDEX: 30.04 KG/M2 | TEMPERATURE: 97 F

## 2025-04-07 DIAGNOSIS — I25.10 ASCVD (ARTERIOSCLEROTIC CARDIOVASCULAR DISEASE): ICD-10-CM

## 2025-04-07 DIAGNOSIS — F41.9 ANXIETY: ICD-10-CM

## 2025-04-07 DIAGNOSIS — I25.119 CORONARY ARTERY DISEASE INVOLVING NATIVE CORONARY ARTERY OF NATIVE HEART WITH ANGINA PECTORIS (HCC): ICD-10-CM

## 2025-04-07 DIAGNOSIS — I25.10 CORONARY ARTERY DISEASE INVOLVING NATIVE CORONARY ARTERY OF NATIVE HEART WITHOUT ANGINA PECTORIS: ICD-10-CM

## 2025-04-07 DIAGNOSIS — K50.90 CROHN'S DISEASE WITHOUT COMPLICATION, UNSPECIFIED GASTROINTESTINAL TRACT LOCATION (HCC): ICD-10-CM

## 2025-04-07 DIAGNOSIS — I10 ESSENTIAL HYPERTENSION: ICD-10-CM

## 2025-04-07 DIAGNOSIS — Z00.00 MEDICARE ANNUAL WELLNESS VISIT, SUBSEQUENT: Primary | ICD-10-CM

## 2025-04-07 DIAGNOSIS — R00.2 PALPITATIONS: ICD-10-CM

## 2025-04-07 DIAGNOSIS — E78.5 HYPERLIPIDEMIA, UNSPECIFIED HYPERLIPIDEMIA TYPE: ICD-10-CM

## 2025-04-07 DIAGNOSIS — T78.40XA ALLERGY, INITIAL ENCOUNTER: ICD-10-CM

## 2025-04-07 DIAGNOSIS — J30.2 SEASONAL ALLERGIES: ICD-10-CM

## 2025-04-07 DIAGNOSIS — K50.018 CROHN'S DISEASE OF SMALL INTESTINE WITH OTHER COMPLICATION (HCC): ICD-10-CM

## 2025-04-07 DIAGNOSIS — J42 CHRONIC BRONCHITIS, UNSPECIFIED CHRONIC BRONCHITIS TYPE (HCC): ICD-10-CM

## 2025-04-07 DIAGNOSIS — K21.9 GASTROESOPHAGEAL REFLUX DISEASE, UNSPECIFIED WHETHER ESOPHAGITIS PRESENT: ICD-10-CM

## 2025-04-07 PROCEDURE — G0439 PPPS, SUBSEQ VISIT: HCPCS | Performed by: FAMILY MEDICINE

## 2025-04-07 RX ORDER — ALPRAZOLAM 1 MG/1
2 TABLET ORAL 3 TIMES DAILY PRN
Qty: 270 TABLET | Refills: 3 | Status: SHIPPED | OUTPATIENT
Start: 2025-04-07

## 2025-04-07 RX ORDER — METOPROLOL TARTRATE 50 MG
75 TABLET ORAL 2 TIMES DAILY
Qty: 270 TABLET | Refills: 3 | Status: SHIPPED | OUTPATIENT
Start: 2025-04-07

## 2025-04-07 RX ORDER — TRAMADOL HYDROCHLORIDE 50 MG/1
100 TABLET ORAL EVERY 6 HOURS PRN
Qty: 240 TABLET | Refills: 5 | Status: SHIPPED | OUTPATIENT
Start: 2025-04-07

## 2025-04-07 RX ORDER — AMLODIPINE BESYLATE 5 MG/1
5 TABLET ORAL DAILY
Qty: 90 TABLET | Refills: 3 | Status: SHIPPED | OUTPATIENT
Start: 2025-04-07

## 2025-04-07 RX ORDER — ATORVASTATIN CALCIUM 80 MG/1
80 TABLET, FILM COATED ORAL DAILY
Qty: 90 TABLET | Refills: 3 | Status: SHIPPED | OUTPATIENT
Start: 2025-04-07

## 2025-04-07 RX ORDER — FEXOFENADINE HCL 180 MG/1
180 TABLET ORAL DAILY
Qty: 180 TABLET | Refills: 3 | Status: SHIPPED | OUTPATIENT
Start: 2025-04-07

## 2025-04-07 RX ORDER — MONTELUKAST SODIUM 10 MG/1
10 TABLET ORAL
Qty: 90 TABLET | Refills: 3 | Status: SHIPPED | OUTPATIENT
Start: 2025-04-07

## 2025-04-07 NOTE — PROGRESS NOTES
"Name: Chelsi Porter      : 1965      MRN: [id number]  Encounter Provider: Uziel Blackmon MD  Encounter Date: 2025   Encounter department: [facility]  :  Assessment & Plan  ASCVD (arteriosclerotic cardiovascular disease)  Stable.  Plan per cardiology.         Coronary artery disease involving native coronary artery of native heart with angina pectoris (HCC)  Plan per cardiology.         Chronic bronchitis, unspecified chronic bronchitis type (HCC)  Stable. Continue current.         Gastroesophageal reflux disease, unspecified whether esophagitis present  No alarm signs.  Continue current.         Crohn's disease of small intestine with other complication (HCC)  Stable.  Plan per cardiology.         Palpitations  Likely SVT.  Increase metoprolol.           Coronary artery disease involving native coronary artery of native heart without angina pectoris    Orders:    metoprolol tartrate (LOPRESSOR) 50 mg tablet; Take 1.5 tablets (75 mg total) by mouth 2 (two) times a day    Anxiety                History of Present Illness   HPI  Review of Systems   All other systems reviewed and are negative.      Objective   /70 (BP Location: Left arm, Patient Position: Sitting, Cuff Size: Large)   Pulse 83   Temp (!) 97 °F (36.1 °C) (Temporal)   Resp 18   Ht 5' 1\" (1.549 m)   Wt 72.2 kg (159 lb 1.6 oz)   SpO2 97%   BMI 30.06 kg/m²      Physical Exam  Vitals and nursing note reviewed.   Constitutional:       Appearance: Normal appearance. She is obese.   Cardiovascular:      Rate and Rhythm: Normal rate and regular rhythm.      Pulses: Normal pulses.      Heart sounds: Normal heart sounds.   Pulmonary:      Effort: Pulmonary effort is normal.      Breath sounds: Normal breath sounds.   Abdominal:      General: Abdomen is flat. Bowel sounds are normal.      Palpations: Abdomen is soft.   Musculoskeletal:         General: Normal range of motion.      Cervical back: Normal " range of motion and neck supple.   Skin:     General: Skin is warm and dry.      Capillary Refill: Capillary refill takes less than 2 seconds.   Neurological:      General: No focal deficit present.      Mental Status: She is alert and oriented to person, place, and time. Mental status is at baseline.   Psychiatric:         Mood and Affect: Mood normal.         Behavior: Behavior normal.         Thought Content: Thought content normal.         Judgment: Judgment normal.

## 2025-04-07 NOTE — PATIENT INSTRUCTIONS
Medicare Preventive Visit Patient Instructions  Thank you for completing your Welcome to Medicare Visit or Medicare Annual Wellness Visit today. Your next wellness visit will be due in one year (4/8/2026).  The screening/preventive services that you may require over the next 5-10 years are detailed below. Some tests may not apply to you based off risk factors and/or age. Screening tests ordered at today's visit but not completed yet may show as past due. Also, please note that scanned in results may not display below.  Preventive Screenings:  Service Recommendations Previous Testing/Comments   Colorectal Cancer Screening  * Colonoscopy    * Fecal Occult Blood Test (FOBT)/Fecal Immunochemical Test (FIT)  * Fecal DNA/Cologuard Test  * Flexible Sigmoidoscopy Age: 45-75 years old   Colonoscopy: every 10 years (may be performed more frequently if at higher risk)  OR  FOBT/FIT: every 1 year  OR  Cologuard: every 3 years  OR  Sigmoidoscopy: every 5 years  Screening may be recommended earlier than age 45 if at higher risk for colorectal cancer. Also, an individualized decision between you and your healthcare provider will decide whether screening between the ages of 76-85 would be appropriate. Colonoscopy: 05/23/2023  FOBT/FIT: Not on file  Cologuard: Not on file  Sigmoidoscopy: Not on file    Screening Current     Breast Cancer Screening Age: 40+ years old  Frequency: every 1-2 years  Not required if history of left and right mastectomy Mammogram: Not on file        Cervical Cancer Screening Between the ages of 21-29, pap smear recommended once every 3 years.   Between the ages of 30-65, can perform pap smear with HPV co-testing every 5 years.   Recommendations may differ for women with a history of total hysterectomy, cervical cancer, or abnormal pap smears in past. Pap Smear: 06/21/2023    Screening Current   Hepatitis C Screening Once for adults born between 1945 and 1965  More frequently in patients at high risk for  Hepatitis C Hep C Antibody: 06/18/2021    Screening Current   Diabetes Screening 1-2 times per year if you're at risk for diabetes or have pre-diabetes Fasting glucose: 90 mg/dL (3/27/2025)  A1C: 5.4 % (8/18/2023)  Screening Current   Cholesterol Screening Once every 5 years if you don't have a lipid disorder. May order more often based on risk factors. Lipid panel: 03/27/2025    Screening Not Indicated  History Lipid Disorder     Other Preventive Screenings Covered by Medicare:  Abdominal Aortic Aneurysm (AAA) Screening: covered once if your at risk. You're considered to be at risk if you have a family history of AAA.  Lung Cancer Screening: covers low dose CT scan once per year if you meet all of the following conditions: (1) Age 55-77; (2) No signs or symptoms of lung cancer; (3) Current smoker or have quit smoking within the last 15 years; (4) You have a tobacco smoking history of at least 20 pack years (packs per day multiplied by number of years you smoked); (5) You get a written order from a healthcare provider.  Glaucoma Screening: covered annually if you're considered high risk: (1) You have diabetes OR (2) Family history of glaucoma OR (3)  aged 50 and older OR (4)  American aged 65 and older  Osteoporosis Screening: covered every 2 years if you meet one of the following conditions: (1) You're estrogen deficient and at risk for osteoporosis based off medical history and other findings; (2) Have a vertebral abnormality; (3) On glucocorticoid therapy for more than 3 months; (4) Have primary hyperparathyroidism; (5) On osteoporosis medications and need to assess response to drug therapy.   Last bone density test (DXA Scan): Not on file.  HIV Screening: covered annually if you're between the age of 15-65. Also covered annually if you are younger than 15 and older than 65 with risk factors for HIV infection. For pregnant patients, it is covered up to 3 times per  pregnancy.    Immunizations:  Immunization Recommendations   Influenza Vaccine Annual influenza vaccination during flu season is recommended for all persons aged >= 6 months who do not have contraindications   Pneumococcal Vaccine   * Pneumococcal conjugate vaccine = PCV13 (Prevnar 13), PCV15 (Vaxneuvance), PCV20 (Prevnar 20)  * Pneumococcal polysaccharide vaccine = PPSV23 (Pneumovax) Adults 19-65 yo with certain risk factors or if 65+ yo  If never received any pneumonia vaccine: recommend Prevnar 20 (PCV20)  Give PCV20 if previously received 1 dose of PCV13 or PPSV23   Hepatitis B Vaccine 3 dose series if at intermediate or high risk (ex: diabetes, end stage renal disease, liver disease)   Respiratory syncytial virus (RSV) Vaccine - COVERED BY MEDICARE PART D  * RSVPreF3 (Arexvy) CDC recommends that adults 60 years of age and older may receive a single dose of RSV vaccine using shared clinical decision-making (SCDM)   Tetanus (Td) Vaccine - COST NOT COVERED BY MEDICARE PART B Following completion of primary series, a booster dose should be given every 10 years to maintain immunity against tetanus. Td may also be given as tetanus wound prophylaxis.   Tdap Vaccine - COST NOT COVERED BY MEDICARE PART B Recommended at least once for all adults. For pregnant patients, recommended with each pregnancy.   Shingles Vaccine (Shingrix) - COST NOT COVERED BY MEDICARE PART B  2 shot series recommended in those 19 years and older who have or will have weakened immune systems or those 50 years and older     Health Maintenance Due:      Topic Date Due   • Breast Cancer Screening: Mammogram  Never done   • Cervical Cancer Screening  06/21/2028   • Colorectal Cancer Screening  05/20/2033   • HIV Screening  Completed   • Hepatitis C Screening  Completed     Immunizations Due:      Topic Date Due   • Influenza Vaccine (1) 09/01/2024   • COVID-19 Vaccine (3 - 2024-25 season) 09/01/2024     Advance Directives   What are advance  directives?  Advance directives are legal documents that state your wishes and plans for medical care. These plans are made ahead of time in case you lose your ability to make decisions for yourself. Advance directives can apply to any medical decision, such as the treatments you want, and if you want to donate organs.   What are the types of advance directives?  There are many types of advance directives, and each state has rules about how to use them. You may choose a combination of any of the following:  Living will:  This is a written record of the treatment you want. You can also choose which treatments you do not want, which to limit, and which to stop at a certain time. This includes surgery, medicine, IV fluid, and tube feedings.   Durable power of  for healthcare (DPAHC):  This is a written record that states who you want to make healthcare choices for you when you are unable to make them for yourself. This person, called a proxy, is usually a family member or a friend. You may choose more than 1 proxy.  Do not resuscitate (DNR) order:  A DNR order is used in case your heart stops beating or you stop breathing. It is a request not to have certain forms of treatment, such as CPR. A DNR order may be included in other types of advance directives.  Medical directive:  This covers the care that you want if you are in a coma, near death, or unable to make decisions for yourself. You can list the treatments you want for each condition. Treatment may include pain medicine, surgery, blood transfusions, dialysis, IV or tube feedings, and a ventilator (breathing machine).  Values history:  This document has questions about your views, beliefs, and how you feel and think about life. This information can help others choose the care that you would choose.  Why are advance directives important?  An advance directive helps you control your care. Although spoken wishes may be used, it is better to have your wishes  written down. Spoken wishes can be misunderstood, or not followed. Treatments may be given even if you do not want them. An advance directive may make it easier for your family to make difficult choices about your care.   Weight Management   Why it is important to manage your weight:  Being overweight increases your risk of health conditions such as heart disease, high blood pressure, type 2 diabetes, and certain types of cancer. It can also increase your risk for osteoarthritis, sleep apnea, and other respiratory problems. Aim for a slow, steady weight loss. Even a small amount of weight loss can lower your risk of health problems.  How to lose weight safely:  A safe and healthy way to lose weight is to eat fewer calories and get regular exercise. You can lose up about 1 pound a week by decreasing the number of calories you eat by 500 calories each day.   Healthy meal plan for weight management:  A healthy meal plan includes a variety of foods, contains fewer calories, and helps you stay healthy. A healthy meal plan includes the following:  Eat whole-grain foods more often.  A healthy meal plan should contain fiber. Fiber is the part of grains, fruits, and vegetables that is not broken down by your body. Whole-grain foods are healthy and provide extra fiber in your diet. Some examples of whole-grain foods are whole-wheat breads and pastas, oatmeal, brown rice, and bulgur.  Eat a variety of vegetables every day.  Include dark, leafy greens such as spinach, kale, alissa greens, and mustard greens. Eat yellow and orange vegetables such as carrots, sweet potatoes, and winter squash.   Eat a variety of fruits every day.  Choose fresh or canned fruit (canned in its own juice or light syrup) instead of juice. Fruit juice has very little or no fiber.  Eat low-fat dairy foods.  Drink fat-free (skim) milk or 1% milk. Eat fat-free yogurt and low-fat cottage cheese. Try low-fat cheeses such as mozzarella and other reduced-fat  cheeses.  Choose meat and other protein foods that are low in fat.  Choose beans or other legumes such as split peas or lentils. Choose fish, skinless poultry (chicken or turkey), or lean cuts of red meat (beef or pork). Before you cook meat or poultry, cut off any visible fat.   Use less fat and oil.  Try baking foods instead of frying them. Add less fat, such as margarine, sour cream, regular salad dressing and mayonnaise to foods. Eat fewer high-fat foods. Some examples of high-fat foods include french fries, doughnuts, ice cream, and cakes.  Eat fewer sweets.  Limit foods and drinks that are high in sugar. This includes candy, cookies, regular soda, and sweetened drinks.  Exercise:  Exercise at least 30 minutes per day on most days of the week. Some examples of exercise include walking, biking, dancing, and swimming. You can also fit in more physical activity by taking the stairs instead of the elevator or parking farther away from stores. Ask your healthcare provider about the best exercise plan for you.   Narcotic (Opioid) Safety    Use narcotics safely:  Take prescribed narcotics exactly as directed  Do not give narcotics to others or take narcotics that belong to someone else  Do not mix narcotics without medicines or alcohol  Do not drive or operate heavy machinery after you take the narcotic  Monitor for side effects and notify your healthcare provider if you experienced side effects such as nausea, sleepiness, itching, or trouble thinking clearly.    Manage constipation:    Constipation is the most common side effect of narcotic medicine. Constipation is when you have hard, dry bowel movements, or you go longer than usual between bowel movements. Tell your healthcare provider about all changes in your bowel movements while you are taking narcotics. He or she may recommend laxative medicine to help you have a bowel movement. He or she may also change the kind of narcotic you are taking, or change when you  take it. The following are more ways you can prevent or relieve constipation:    Drink liquids as directed.  You may need to drink extra liquids to help soften and move your bowels. Ask how much liquid to drink each day and which liquids are best for you.  Eat high-fiber foods.  This may help decrease constipation by adding bulk to your bowel movements. High-fiber foods include fruits, vegetables, whole-grain breads and cereals, and beans. Your healthcare provider or dietitian can help you create a high-fiber meal plan. Your provider may also recommend a fiber supplement if you cannot get enough fiber from food.  Exercise regularly.  Regular physical activity can help stimulate your intestines. Walking is a good exercise to prevent or relieve constipation. Ask which exercises are best for you.  Schedule a time each day to have a bowel movement.  This may help train your body to have regular bowel movements. Bend forward while you are on the toilet to help move the bowel movement out. Sit on the toilet for at least 10 minutes, even if you do not have a bowel movement.    Store narcotics safely:   Store narcotics where others cannot easily get them.  Keep them in a locked cabinet or secure area. Do not  keep them in a purse or other bag you carry with you. A person may be looking for something else and find the narcotics.  Make sure narcotics are stored out of the reach of children.  A child can easily overdose on narcotics. Narcotics may look like candy to a small child.    The best way to dispose of narcotics:      The laws vary by country and area. In the [place], the best way is to return the narcotics through a take-back program. This program is offered by the [place] Drug Enforcement Agency (MADY). The following are options for using the program:  Take the narcotics to a MADY collection site.  The site is often a law enforcement center. Call your local law enforcement center for scheduled take-back days in your  area. You will be given information on where to go if the collection site is in a different location.  Take the narcotics to an approved pharmacy or hospital.  A pharmacy or hospital may be set up as a collection site. You will need to ask if it is a MADY collection site if you were not directed there. A pharmacy or doctor's office may not be able to take back narcotics unless it is a MADY site.  Use a mail-back system.  This means you are given containers to put the narcotics into. You will then mail them in the containers.  Use a take-back drop box.  This is a place to leave the narcotics at any time. People and animals will not be able to get into the box. Your local law enforcement agency can tell you where to find a drop box in your area.    Other ways to manage pain:   Ask your healthcare provider about non-narcotic medicines to control pain.  Nonprescription medicines include NSAIDs (such as ibuprofen) and acetaminophen. Prescription medicines include muscle relaxers, antidepressants, and steroids.  Pain may be managed without any medicines.  Some ways to relieve pain include massage, aromatherapy, or meditation. Physical or occupational therapy may also help.    For more information:   Drug Enforcement Administration  [address] [place] [address]  Phone: 7- 664 - 761-2917  Web Address: https://www.deadiversion.[facility] Facility[facility].gov/drug_disposal/    US Food and Drug Administration  [address] [place] [address]  Phone: 7- 060 - 977-8506  Web Address: http://www.fda.gov     © Copyright [employer] 2018 Information is for End User's use only and may not be sold, redistributed or otherwise used for commercial purposes. All illustrations and images included in CareNotes® are the copyrighted property of [employer] or [employer]

## 2025-04-07 NOTE — ASSESSMENT & PLAN NOTE
Orders:    ALPRAZolam (XANAX) 1 mg tablet; Take 2 tablets (2 mg total) by mouth 3 (three) times a day as needed for anxiety

## 2025-04-07 NOTE — PROGRESS NOTES
Name: Chelsi Porter      : 1965      MRN: [id number]  Encounter Provider: Uziel Blackmon MD  Encounter Date: 2025   Encounter department: [facility]  :  Assessment & Plan  Medicare annual wellness visit, subsequent         ASCVD (arteriosclerotic cardiovascular disease)         Coronary artery disease involving native coronary artery of native heart with angina pectoris (HCC)         Chronic bronchitis, unspecified chronic bronchitis type (HCC)         Gastroesophageal reflux disease, unspecified whether esophagitis present         Crohn's disease of small intestine with other complication (HCC)         Palpitations         Coronary artery disease involving native coronary artery of native heart without angina pectoris    Orders:    metoprolol tartrate (LOPRESSOR) 50 mg tablet; Take 1.5 tablets (75 mg total) by mouth 2 (two) times a day    Anxiety    Orders:    ALPRAZolam (XANAX) 1 mg tablet; Take 2 tablets (2 mg total) by mouth 3 (three) times a day as needed for anxiety       Preventive health issues were discussed with patient, and age appropriate screening tests were ordered as noted in patient's After Visit Summary. Personalized health advice and appropriate referrals for health education or preventive services given if needed, as noted in patient's After Visit Summary.    History of Present Illness     HPI   Patient Care Team:  Uziel Blackmon MD as PCP - General (Family Medicine)  Uziel Blackmon MD as PCP - [facility] ([facility])  [facility]  Uziel Blackmon MD as PCP - Family Medicine (Family Medicine)  Eduardo Palmer DO (Cardiology)  Андрей Heath Jr., MD (Cardiology)  Jodi Copeland PA-C as Physician Assistant (Gastroenterology)    Review of Systems  Medical History Reviewed by provider this encounter:       Annual Wellness Visit Questionnaire   Chelsi is here for her Subsequent Wellness visit.     Health  Risk Assessment:   Patient rates overall health as fair. Patient feels that their physical health rating is slightly worse. Patient is satisfied with their life. Eyesight was rated as same. Hearing was rated as same. Patient feels that their emotional and mental health rating is slightly worse. Patients states they are never, rarely angry. Patient states they are often unusually tired/fatigued. Pain experienced in the last 7 days has been a lot. Patient's pain rating has been 5/10. Patient states that she has experienced weight loss or gain in last 6 months.     Fall Risk Screening:   In the past year, patient has experienced: no history of falling in past year      Urinary Incontinence Screening:   Patient has not leaked urine accidently in the last six months.     Home Safety:  Patient does not have trouble with stairs inside or outside of their home. Patient has working smoke alarms and has working carbon monoxide detector. Home safety hazards include: none.     Nutrition:   Current diet is Regular.     Medications:   Patient is currently taking over-the-counter supplements. OTC medications include: see medication list. Patient is able to manage medications.     Activities of Daily Living (ADLs)/Instrumental Activities of Daily Living (IADLs):   Walk and transfer into and out of bed and chair?: Yes  Dress and groom yourself?: Yes    Bathe or shower yourself?: Yes    Feed yourself? Yes  Do your laundry/housekeeping?: Yes  Manage your money, pay your bills and track your expenses?: Yes  Make your own meals?: Yes    Do your own shopping?: Yes    Previous Hospitalizations:   Any hospitalizations or ED visits within the last 12 months?: No      Advance Care Planning:   Living will: No    Durable POA for healthcare: Yes    Advanced directive: No      PREVENTIVE SCREENINGS      Cardiovascular Screening:    General: Screening Not Indicated and History Lipid Disorder      Diabetes Screening:     General: Screening  "Current      Colorectal Cancer Screening:     General: Screening Current      Cervical Cancer Screening:    General: Screening Current      Lung Cancer Screening:     General: Screening Not Indicated      Hepatitis C Screening:    General: Screening Current    Screening, Brief Intervention, and Referral to Treatment (SBIRT)     Screening  Typical number of drinks in a day: 0  Typical number of drinks in a week: 0  Interpretation: Low risk drinking behavior.    AUDIT-C Screenin) How often did you have a drink containing alcohol in the past year? never  2) How many drinks did you have on a typical day when you were drinking in the past year? 0  3) How often did you have 6 or more drinks on one occasion in the past year? never    AUDIT-C Score: 0  Interpretation: Score 0-2 (female): Negative screen for alcohol misuse    SDOH Risk Assessment  Social determinants of health (SDOH) risk assesment tool was completed. The tool at a minimum covered housing stability, food insecurity, transportation needs, and utility difficulty. Patient had at risk responses for the following SDOH domains: food insecurity.     Review of Current Opioid Use  Opioid Risk Tool (ORT) Score: 0  Opioid Risk Tool (ORT) Interpretation: Complete Opioid Risk Tool (ORT)    Social Drivers of Health     Financial Resource Strain: High Risk (1/3/2024)    Overall Financial Resource Strain (CARDIA)     Difficulty of Paying Living Expenses: Hard   Transportation Needs: No Transportation Needs (1/3/2024)    PRAPARE - Transportation     Lack of Transportation (Medical): No     Lack of Transportation (Non-Medical): No    Received from [facility]     No results found.    Objective   /70 (BP Location: Left arm, Patient Position: Sitting, Cuff Size: Large)   Pulse 83   Temp (!) 97 °F (36.1 °C) (Temporal)   Resp 18   Ht 5' 1\" (1.549 m)   Wt 72.2 kg (159 lb 1.6 oz)   SpO2 97%   BMI 30.06 kg/m²     Physical Exam    "

## 2025-04-25 ENCOUNTER — TELEPHONE (OUTPATIENT)
Dept: GASTROENTEROLOGY | Facility: CLINIC | Age: 60
End: 2025-04-25

## 2025-04-25 NOTE — TELEPHONE ENCOUNTER
Missed follow up office visit with Jodi Copeland of gastroenterology. Calling to see if she wants a new appointment.

## 2025-06-11 ENCOUNTER — TELEPHONE (OUTPATIENT)
Dept: FAMILY MEDICINE CLINIC | Facility: CLINIC | Age: 60
End: 2025-06-11

## 2025-06-11 DIAGNOSIS — I10 ESSENTIAL HYPERTENSION: ICD-10-CM

## 2025-06-11 DIAGNOSIS — I25.10 CORONARY ARTERY DISEASE INVOLVING NATIVE CORONARY ARTERY OF NATIVE HEART WITHOUT ANGINA PECTORIS: ICD-10-CM

## 2025-06-11 DIAGNOSIS — E78.5 HYPERLIPIDEMIA, UNSPECIFIED HYPERLIPIDEMIA TYPE: ICD-10-CM

## 2025-06-11 DIAGNOSIS — T78.40XA ALLERGY, INITIAL ENCOUNTER: ICD-10-CM

## 2025-06-11 RX ORDER — AMLODIPINE BESYLATE 5 MG/1
5 TABLET ORAL DAILY
Qty: 90 TABLET | Refills: 1 | Status: SHIPPED | OUTPATIENT
Start: 2025-06-11

## 2025-06-11 RX ORDER — MONTELUKAST SODIUM 10 MG/1
10 TABLET ORAL
Qty: 90 TABLET | Refills: 1 | Status: SHIPPED | OUTPATIENT
Start: 2025-06-11

## 2025-06-11 RX ORDER — ATORVASTATIN CALCIUM 80 MG/1
80 TABLET, FILM COATED ORAL DAILY
Qty: 90 TABLET | Refills: 1 | Status: SHIPPED | OUTPATIENT
Start: 2025-06-11

## 2025-06-11 RX ORDER — METOPROLOL TARTRATE 50 MG
75 TABLET ORAL 2 TIMES DAILY
Qty: 270 TABLET | Refills: 1 | Status: SHIPPED | OUTPATIENT
Start: 2025-06-11

## 2025-06-11 NOTE — TELEPHONE ENCOUNTER
PHARMACY CHANGE     Reason for call:   [x] Refill   [] Prior Auth  [] Other:     Office:   [x] PCP/Provider - Uziel Blackmon MD   [] Specialty/Provider -     Medication:     amLODIPine (NORVASC) 5 mg tablet   atorvastatin (LIPITOR) 80 mg tablet   metoprolol tartrate (LOPRESSOR) 50 mg tablet   montelukast (SINGULAIR) 10 mg tablet   Dose/Frequency:     5 mg, Oral, Daily   80 mg, Oral, Daily     75 mg, Oral, 2 times daily       10 mg, Oral, Daily at bedtime     Quantity:   90  90  180  90    Pharmacy: [facility] [address] [place] [address] Pharmacy   Does the patient have enough for 3 days?   [x] Yes   [] No - Send as HP to POD    Mail Away Pharmacy   Does the patient have enough for 10 days?   [] Yes   [] No - Send as HP to POD

## 2025-06-11 NOTE — TELEPHONE ENCOUNTER
Tahira from [facility] called asking that the Alprazolam be written as Alprazolam 2 mg tablet 90 tablets dispensed, due to the current prescription denies for quantity limits.  Also  asked for the Tramadol to please be refilled for patient and another prior authorization submitted advising patient is being treated for Chronic Pain and show documentation. Please send prescriptions to patient's new pharmacy [facility] [address] [place] [address].

## 2025-06-19 DIAGNOSIS — K50.90 CROHN'S DISEASE WITHOUT COMPLICATION, UNSPECIFIED GASTROINTESTINAL TRACT LOCATION (HCC): ICD-10-CM

## 2025-06-19 DIAGNOSIS — F41.9 ANXIETY: ICD-10-CM

## 2025-06-20 ENCOUNTER — TELEPHONE (OUTPATIENT)
Dept: FAMILY MEDICINE CLINIC | Facility: CLINIC | Age: 60
End: 2025-06-20

## 2025-06-20 ENCOUNTER — DOCUMENTATION (OUTPATIENT)
Dept: FAMILY MEDICINE CLINIC | Facility: CLINIC | Age: 60
End: 2025-06-20

## 2025-06-20 RX ORDER — ALPRAZOLAM 1 MG/1
2 TABLET ORAL 3 TIMES DAILY PRN
Qty: 270 TABLET | Refills: 3 | Status: SHIPPED | OUTPATIENT
Start: 2025-06-20 | End: 2025-06-24 | Stop reason: SDUPTHER

## 2025-06-20 RX ORDER — TRAMADOL HYDROCHLORIDE 50 MG/1
100 TABLET ORAL EVERY 6 HOURS PRN
Qty: 240 TABLET | Refills: 5 | Status: SHIPPED | OUTPATIENT
Start: 2025-06-20 | End: 2025-06-24 | Stop reason: SDUPTHER

## 2025-06-20 NOTE — TELEPHONE ENCOUNTER
[facility] health insurance called, denied prior auth for alprazolam, they will send notification with reasoning.   Just wanted to make you aware  ty

## 2025-06-23 ENCOUNTER — TELEPHONE (OUTPATIENT)
Dept: FAMILY MEDICINE CLINIC | Facility: CLINIC | Age: 60
End: 2025-06-23

## 2025-06-23 NOTE — TELEPHONE ENCOUNTER
Pt stopped in office requesting her Tramadol and Alprazolam be transferred to [facility] in [place] she states that if her ins will not cover the medication that she is willing to pay out of pocket.

## 2025-06-24 DIAGNOSIS — K50.90 CROHN'S DISEASE WITHOUT COMPLICATION, UNSPECIFIED GASTROINTESTINAL TRACT LOCATION (HCC): ICD-10-CM

## 2025-06-24 DIAGNOSIS — F41.9 ANXIETY: ICD-10-CM

## 2025-06-24 RX ORDER — TRAMADOL HYDROCHLORIDE 50 MG/1
100 TABLET ORAL EVERY 6 HOURS PRN
Qty: 240 TABLET | Refills: 5 | Status: SHIPPED | OUTPATIENT
Start: 2025-06-24

## 2025-06-24 RX ORDER — ALPRAZOLAM 1 MG/1
2 TABLET ORAL 3 TIMES DAILY PRN
Qty: 270 TABLET | Refills: 3 | Status: SHIPPED | OUTPATIENT
Start: 2025-06-24

## 2025-06-25 ENCOUNTER — RA CDI HCC (OUTPATIENT)
Dept: RADIOLOGY | Facility: HOSPITAL | Age: 60
End: 2025-06-25

## 2025-07-07 ENCOUNTER — OFFICE VISIT (OUTPATIENT)
Dept: FAMILY MEDICINE CLINIC | Facility: CLINIC | Age: 60
End: 2025-07-07
Payer: COMMERCIAL

## 2025-07-07 VITALS
TEMPERATURE: 97.3 F | DIASTOLIC BLOOD PRESSURE: 74 MMHG | HEART RATE: 74 BPM | OXYGEN SATURATION: 95 % | HEIGHT: 61 IN | BODY MASS INDEX: 29.27 KG/M2 | RESPIRATION RATE: 18 BRPM | SYSTOLIC BLOOD PRESSURE: 122 MMHG | WEIGHT: 155 LBS

## 2025-07-07 DIAGNOSIS — I25.10 CORONARY ARTERY DISEASE INVOLVING NATIVE CORONARY ARTERY OF NATIVE HEART WITHOUT ANGINA PECTORIS: ICD-10-CM

## 2025-07-07 DIAGNOSIS — K50.018 CROHN'S DISEASE OF SMALL INTESTINE WITH OTHER COMPLICATION (HCC): ICD-10-CM

## 2025-07-07 DIAGNOSIS — I25.10 ASCVD (ARTERIOSCLEROTIC CARDIOVASCULAR DISEASE): ICD-10-CM

## 2025-07-07 DIAGNOSIS — F41.9 ANXIETY: Primary | ICD-10-CM

## 2025-07-07 DIAGNOSIS — K50.90 CROHN'S DISEASE WITHOUT COMPLICATION, UNSPECIFIED GASTROINTESTINAL TRACT LOCATION (HCC): ICD-10-CM

## 2025-07-07 DIAGNOSIS — E78.2 MIXED HYPERLIPIDEMIA: ICD-10-CM

## 2025-07-07 PROCEDURE — 99214 OFFICE O/P EST MOD 30 MIN: CPT | Performed by: FAMILY MEDICINE

## 2025-07-07 RX ORDER — NITROGLYCERIN 0.4 MG/1
0.4 TABLET SUBLINGUAL
Qty: 100 TABLET | Refills: 5 | Status: SHIPPED | OUTPATIENT
Start: 2025-07-07

## 2025-07-07 RX ORDER — ALPRAZOLAM 2 MG/1
2 TABLET ORAL 3 TIMES DAILY PRN
Qty: 90 TABLET | Refills: 5 | Status: SHIPPED | OUTPATIENT
Start: 2025-07-07

## 2025-07-07 RX ORDER — TRAMADOL HYDROCHLORIDE 50 MG/1
100 TABLET ORAL EVERY 6 HOURS PRN
Qty: 240 TABLET | Refills: 5 | Status: SHIPPED | OUTPATIENT
Start: 2025-07-07 | End: 2025-07-07 | Stop reason: SDUPTHER

## 2025-07-07 RX ORDER — TRAMADOL HYDROCHLORIDE 50 MG/1
100 TABLET ORAL EVERY 6 HOURS PRN
Qty: 56 TABLET | Refills: 0 | Status: SHIPPED | OUTPATIENT
Start: 2025-07-07 | End: 2025-07-17 | Stop reason: SDUPTHER

## 2025-07-07 NOTE — ASSESSMENT & PLAN NOTE
Stable.  Plan per cardiology.    Orders:    Lipid panel; Future    Comprehensive metabolic panel; Future

## 2025-07-07 NOTE — ASSESSMENT & PLAN NOTE
Stable. Continue current.    Orders:    Lipid panel; Future    Comprehensive metabolic panel; Future
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
## 2025-07-17 DIAGNOSIS — K50.90 CROHN'S DISEASE WITHOUT COMPLICATION, UNSPECIFIED GASTROINTESTINAL TRACT LOCATION (HCC): ICD-10-CM

## 2025-07-17 RX ORDER — TRAMADOL HYDROCHLORIDE 50 MG/1
100 TABLET ORAL EVERY 6 HOURS PRN
Qty: 56 TABLET | Refills: 0 | Status: SHIPPED | OUTPATIENT
Start: 2025-07-17

## 2025-08-01 DIAGNOSIS — K50.90 CROHN'S DISEASE WITHOUT COMPLICATION, UNSPECIFIED GASTROINTESTINAL TRACT LOCATION (HCC): ICD-10-CM

## 2025-08-02 ENCOUNTER — TELEPHONE (OUTPATIENT)
Dept: OTHER | Facility: OTHER | Age: 60
End: 2025-08-02

## 2025-08-04 RX ORDER — TRAMADOL HYDROCHLORIDE 50 MG/1
50 TABLET ORAL EVERY 6 HOURS PRN
Qty: 56 TABLET | Refills: 0 | Status: SHIPPED | OUTPATIENT
Start: 2025-08-04

## 2025-08-21 ENCOUNTER — VBI (OUTPATIENT)
Dept: ADMINISTRATIVE | Facility: OTHER | Age: 60
End: 2025-08-21